# Patient Record
Sex: MALE | Race: WHITE | NOT HISPANIC OR LATINO | Employment: OTHER | URBAN - METROPOLITAN AREA
[De-identification: names, ages, dates, MRNs, and addresses within clinical notes are randomized per-mention and may not be internally consistent; named-entity substitution may affect disease eponyms.]

---

## 2017-11-02 ENCOUNTER — HOSPITAL ENCOUNTER (OUTPATIENT)
Dept: FAMILY MEDICINE CLINIC | Facility: CLINIC | Age: 82
Setting detail: SPECIMEN
Discharge: HOME | End: 2017-11-02
Attending: FAMILY MEDICINE | Admitting: FAMILY MEDICINE

## 2017-11-02 LAB
ALBUMIN SERPL-MCNC: 4.6 G/DL (ref 3.5–4.8)
ALBUMIN/GLOB SERPL: 1.9 {RATIO} (ref 1–1.7)
ALP SERPL-CCNC: 86 IU/L (ref 32–91)
ALT SERPL-CCNC: 22 IU/L (ref 17–63)
ANION GAP SERPL CALC-SCNC: 11.8 MMOL/L (ref 10–20)
AST SERPL-CCNC: 24 IU/L (ref 15–41)
BASOPHILS # BLD AUTO: 0 10*3/UL (ref 0–0.2)
BASOPHILS NFR BLD AUTO: 1 % (ref 0–2)
BILIRUB SERPL-MCNC: 0.7 MG/DL (ref 0.3–1.2)
BUN SERPL-MCNC: 14 MG/DL (ref 8–20)
BUN/CREAT SERPL: 20 (ref 6.2–20.3)
CALCIUM SERPL-MCNC: 9.5 MG/DL (ref 8.9–10.3)
CHLORIDE SERPL-SCNC: 101 MMOL/L (ref 101–111)
CHOLEST SERPL-MCNC: 160 MG/DL
CHOLEST/HDLC SERPL: 3.4 {RATIO}
CONV CO2: 29 MMOL/L (ref 22–32)
CONV LDL CHOLESTEROL DIRECT: 88 MG/DL (ref 0–100)
CONV TOTAL PROTEIN: 7 G/DL (ref 6.1–7.9)
CREAT UR-MCNC: 0.7 MG/DL (ref 0.7–1.2)
DIFFERENTIAL METHOD BLD: (no result)
EOSINOPHIL # BLD AUTO: 0.1 10*3/UL (ref 0–0.3)
EOSINOPHIL # BLD AUTO: 2 % (ref 0–3)
ERYTHROCYTE [DISTWIDTH] IN BLOOD BY AUTOMATED COUNT: 12.7 % (ref 11.5–14.5)
GLOBULIN UR ELPH-MCNC: 2.4 G/DL (ref 2.5–3.8)
GLUCOSE SERPL-MCNC: 110 MG/DL (ref 65–99)
HCT VFR BLD AUTO: 45.1 % (ref 40–54)
HDLC SERPL-MCNC: 47 MG/DL
HGB BLD-MCNC: 15 G/DL (ref 14–18)
LDLC/HDLC SERPL: 1.9 {RATIO}
LIPID INTERPRETATION: ABNORMAL
LYMPHOCYTES # BLD AUTO: 0.8 10*3/UL (ref 0.8–4.8)
LYMPHOCYTES NFR BLD AUTO: 16 % (ref 18–42)
MCH RBC QN AUTO: 32.4 PG (ref 26–32)
MCHC RBC AUTO-ENTMCNC: 33.4 G/DL (ref 32–36)
MCV RBC AUTO: 97.1 FL (ref 80–94)
MONOCYTES # BLD AUTO: 0.6 10*3/UL (ref 0.1–1.3)
MONOCYTES NFR BLD AUTO: 11 % (ref 2–11)
NEUTROPHILS # BLD AUTO: 3.7 10*3/UL (ref 2.3–8.6)
NEUTROPHILS NFR BLD AUTO: 70 % (ref 50–75)
NRBC BLD AUTO-RTO: 0 /100{WBCS}
NRBC/RBC NFR BLD MANUAL: 0 10*3/UL
PLATELET # BLD AUTO: 191 10*3/UL (ref 150–450)
PMV BLD AUTO: 8.2 FL (ref 7.4–10.4)
POTASSIUM SERPL-SCNC: 3.8 MMOL/L (ref 3.6–5.1)
RBC # BLD AUTO: 4.64 10*6/UL (ref 4.6–6)
SODIUM SERPL-SCNC: 138 MMOL/L (ref 136–144)
TRIGL SERPL-MCNC: 177 MG/DL
VLDLC SERPL CALC-MCNC: 25 MG/DL
WBC # BLD AUTO: 5.2 10*3/UL (ref 4.5–11.5)

## 2018-04-18 ENCOUNTER — HOSPITAL ENCOUNTER (OUTPATIENT)
Dept: FAMILY MEDICINE CLINIC | Facility: CLINIC | Age: 83
Setting detail: SPECIMEN
Discharge: HOME | End: 2018-04-18
Attending: FAMILY MEDICINE | Admitting: FAMILY MEDICINE

## 2018-04-18 LAB
ALBUMIN SERPL-MCNC: 4.7 G/DL (ref 3.5–4.8)
ALBUMIN/GLOB SERPL: 1.6 {RATIO} (ref 1–1.7)
ALP SERPL-CCNC: 89 IU/L (ref 32–91)
ALT SERPL-CCNC: 24 IU/L (ref 17–63)
ANION GAP SERPL CALC-SCNC: 11.1 MMOL/L (ref 10–20)
AST SERPL-CCNC: 26 IU/L (ref 15–41)
BILIRUB SERPL-MCNC: 0.7 MG/DL (ref 0.3–1.2)
BILIRUB UR QL STRIP: NEGATIVE MG/DL
BUN SERPL-MCNC: 17 MG/DL (ref 8–20)
BUN/CREAT SERPL: 17 (ref 6.2–20.3)
CALCIUM SERPL-MCNC: 9.9 MG/DL (ref 8.9–10.3)
CASTS URNS QL MICRO: NORMAL /[LPF]
CHLORIDE SERPL-SCNC: 100 MMOL/L (ref 101–111)
CHOLEST SERPL-MCNC: 168 MG/DL
CHOLEST/HDLC SERPL: 3.7 {RATIO}
COLOR UR: YELLOW
CONV BACTERIA IN URINE MICRO: NEGATIVE
CONV CLARITY OF URINE: CLEAR
CONV CO2: 30 MMOL/L (ref 22–32)
CONV HYALINE CASTS IN URINE MICRO: 2 /[LPF] (ref 0–5)
CONV LDL CHOLESTEROL DIRECT: 95 MG/DL (ref 0–100)
CONV PROTEIN IN URINE BY AUTOMATED TEST STRIP: NEGATIVE MG/DL
CONV SMALL ROUND CELLS: NORMAL /[HPF]
CONV TOTAL PROTEIN: 7.6 G/DL (ref 6.1–7.9)
CONV UROBILINOGEN IN URINE BY AUTOMATED TEST STRIP: 0.2 MG/DL
CREAT UR-MCNC: 1 MG/DL (ref 0.7–1.2)
CULTURE INDICATED?: NORMAL
GLOBULIN UR ELPH-MCNC: 2.9 G/DL (ref 2.5–3.8)
GLUCOSE SERPL-MCNC: 126 MG/DL (ref 65–99)
GLUCOSE UR QL: NEGATIVE MG/DL
HDLC SERPL-MCNC: 46 MG/DL
HGB UR QL STRIP: NEGATIVE
KETONES UR QL STRIP: NEGATIVE MG/DL
LDLC/HDLC SERPL: 2.1 {RATIO}
LEUKOCYTE ESTERASE UR QL STRIP: NEGATIVE
LIPID INTERPRETATION: ABNORMAL
NITRITE UR QL STRIP: NEGATIVE
PH UR STRIP.AUTO: 5 [PH] (ref 4.5–8)
POTASSIUM SERPL-SCNC: 4.1 MMOL/L (ref 3.6–5.1)
RBC #/AREA URNS HPF: 0 /[HPF] (ref 0–3)
SODIUM SERPL-SCNC: 137 MMOL/L (ref 136–144)
SP GR UR: 1.02 (ref 1–1.03)
SPERM URNS QL MICRO: NORMAL /[HPF]
SQUAMOUS SPT QL MICRO: 1 /[HPF] (ref 0–5)
TRIGL SERPL-MCNC: 133 MG/DL
UNIDENT CRYS URNS QL MICRO: NORMAL /[HPF]
VLDLC SERPL CALC-MCNC: 27.9 MG/DL
WBC #/AREA URNS HPF: 1 /[HPF] (ref 0–5)
YEAST SPEC QL WET PREP: NORMAL /[HPF]

## 2018-08-31 ENCOUNTER — HOSPITAL ENCOUNTER (OUTPATIENT)
Dept: URGENT CARE | Facility: CLINIC | Age: 83
Discharge: HOME | End: 2018-08-31
Attending: EMERGENCY MEDICINE | Admitting: EMERGENCY MEDICINE

## 2018-11-13 ENCOUNTER — HOSPITAL ENCOUNTER (OUTPATIENT)
Dept: FAMILY MEDICINE CLINIC | Facility: CLINIC | Age: 83
Setting detail: SPECIMEN
Discharge: HOME | End: 2018-11-13
Attending: FAMILY MEDICINE | Admitting: FAMILY MEDICINE

## 2018-11-13 LAB
ALBUMIN SERPL-MCNC: 4.4 G/DL (ref 3.5–4.8)
ALBUMIN/GLOB SERPL: 1.8 {RATIO} (ref 1–1.7)
ALP SERPL-CCNC: 91 IU/L (ref 32–91)
ALT SERPL-CCNC: 23 IU/L (ref 17–63)
ANION GAP SERPL CALC-SCNC: 11 MMOL/L (ref 10–20)
AST SERPL-CCNC: 25 IU/L (ref 15–41)
BASOPHILS # BLD AUTO: 0 10*3/UL (ref 0–0.2)
BASOPHILS NFR BLD AUTO: 1 % (ref 0–2)
BILIRUB SERPL-MCNC: 0.8 MG/DL (ref 0.3–1.2)
BILIRUB UR QL STRIP: NEGATIVE MG/DL
BUN SERPL-MCNC: 14 MG/DL (ref 8–20)
BUN/CREAT SERPL: 15.6 (ref 6.2–20.3)
CALCIUM SERPL-MCNC: 9.3 MG/DL (ref 8.9–10.3)
CASTS URNS QL MICRO: NORMAL /[LPF]
CHLORIDE SERPL-SCNC: 99 MMOL/L (ref 101–111)
CHOLEST SERPL-MCNC: 173 MG/DL
CHOLEST/HDLC SERPL: 3.8 {RATIO}
COLOR UR: YELLOW
CONV BACTERIA IN URINE MICRO: NEGATIVE
CONV CLARITY OF URINE: CLEAR
CONV CO2: 28 MMOL/L (ref 22–32)
CONV HYALINE CASTS IN URINE MICRO: 0 /[LPF] (ref 0–5)
CONV LDL CHOLESTEROL DIRECT: 102 MG/DL (ref 0–100)
CONV PROTEIN IN URINE BY AUTOMATED TEST STRIP: NEGATIVE MG/DL
CONV SMALL ROUND CELLS: NORMAL /[HPF]
CONV TOTAL PROTEIN: 6.8 G/DL (ref 6.1–7.9)
CONV UROBILINOGEN IN URINE BY AUTOMATED TEST STRIP: 0.2 MG/DL
CREAT UR-MCNC: 0.9 MG/DL (ref 0.7–1.2)
CULTURE INDICATED?: NORMAL
DIFFERENTIAL METHOD BLD: (no result)
EOSINOPHIL # BLD AUTO: 0.1 10*3/UL (ref 0–0.3)
EOSINOPHIL # BLD AUTO: 1 % (ref 0–3)
ERYTHROCYTE [DISTWIDTH] IN BLOOD BY AUTOMATED COUNT: 12.7 % (ref 11.5–14.5)
GLOBULIN UR ELPH-MCNC: 2.4 G/DL (ref 2.5–3.8)
GLUCOSE SERPL-MCNC: 127 MG/DL (ref 65–99)
GLUCOSE UR QL: NEGATIVE MG/DL
HCT VFR BLD AUTO: 44.3 % (ref 40–54)
HDLC SERPL-MCNC: 45 MG/DL
HGB BLD-MCNC: 14.7 G/DL (ref 14–18)
HGB UR QL STRIP: NEGATIVE
KETONES UR QL STRIP: NEGATIVE MG/DL
LDLC/HDLC SERPL: 2.3 {RATIO}
LEUKOCYTE ESTERASE UR QL STRIP: NEGATIVE
LIPID INTERPRETATION: ABNORMAL
LYMPHOCYTES # BLD AUTO: 0.9 10*3/UL (ref 0.8–4.8)
LYMPHOCYTES NFR BLD AUTO: 17 % (ref 18–42)
MCH RBC QN AUTO: 31.7 PG (ref 26–32)
MCHC RBC AUTO-ENTMCNC: 33.1 G/DL (ref 32–36)
MCV RBC AUTO: 95.8 FL (ref 80–94)
MONOCYTES # BLD AUTO: 0.6 10*3/UL (ref 0.1–1.3)
MONOCYTES NFR BLD AUTO: 11 % (ref 2–11)
NEUTROPHILS # BLD AUTO: 3.7 10*3/UL (ref 2.3–8.6)
NEUTROPHILS NFR BLD AUTO: 70 % (ref 50–75)
NITRITE UR QL STRIP: NEGATIVE
NRBC BLD AUTO-RTO: 0 /100{WBCS}
NRBC/RBC NFR BLD MANUAL: 0 10*3/UL
PH UR STRIP.AUTO: 5.5 [PH] (ref 4.5–8)
PLATELET # BLD AUTO: 200 10*3/UL (ref 150–450)
PMV BLD AUTO: 8.9 FL (ref 7.4–10.4)
POTASSIUM SERPL-SCNC: 4 MMOL/L (ref 3.6–5.1)
RBC # BLD AUTO: 4.63 10*6/UL (ref 4.6–6)
RBC #/AREA URNS HPF: 0 /[HPF] (ref 0–3)
SODIUM SERPL-SCNC: 134 MMOL/L (ref 136–144)
SP GR UR: 1.02 (ref 1–1.03)
SPERM URNS QL MICRO: NORMAL /[HPF]
SQUAMOUS SPT QL MICRO: 0 /[HPF] (ref 0–5)
TRIGL SERPL-MCNC: 171 MG/DL
UNIDENT CRYS URNS QL MICRO: NORMAL /[HPF]
VLDLC SERPL CALC-MCNC: 25.8 MG/DL
WBC # BLD AUTO: 5.3 10*3/UL (ref 4.5–11.5)
WBC #/AREA URNS HPF: 1 /[HPF] (ref 0–5)
YEAST SPEC QL WET PREP: NORMAL /[HPF]

## 2019-05-13 ENCOUNTER — HOSPITAL ENCOUNTER (OUTPATIENT)
Dept: FAMILY MEDICINE CLINIC | Facility: CLINIC | Age: 84
Setting detail: SPECIMEN
Discharge: HOME | End: 2019-05-13
Attending: FAMILY MEDICINE | Admitting: FAMILY MEDICINE

## 2019-05-13 LAB
ALBUMIN SERPL-MCNC: 4.3 G/DL (ref 3.5–4.8)
ALBUMIN/GLOB SERPL: 1.7 {RATIO} (ref 1–1.7)
ALP SERPL-CCNC: 86 IU/L (ref 32–91)
ALT SERPL-CCNC: 20 IU/L (ref 17–63)
ANION GAP SERPL CALC-SCNC: 12.1 MMOL/L (ref 10–20)
AST SERPL-CCNC: 21 IU/L (ref 15–41)
BASOPHILS # BLD AUTO: 0 10*3/UL (ref 0–0.2)
BASOPHILS NFR BLD AUTO: 0 % (ref 0–2)
BILIRUB SERPL-MCNC: 0.7 MG/DL (ref 0.3–1.2)
BUN SERPL-MCNC: 13 MG/DL (ref 8–20)
BUN/CREAT SERPL: 14.4 (ref 6.2–20.3)
CALCIUM SERPL-MCNC: 9.3 MG/DL (ref 8.9–10.3)
CHLORIDE SERPL-SCNC: 102 MMOL/L (ref 101–111)
CHOLEST SERPL-MCNC: 170 MG/DL
CHOLEST/HDLC SERPL: 3.9 {RATIO}
CONV CO2: 26 MMOL/L (ref 22–32)
CONV LDL CHOLESTEROL DIRECT: 99 MG/DL (ref 0–100)
CONV TOTAL PROTEIN: 6.8 G/DL (ref 6.1–7.9)
CREAT UR-MCNC: 0.9 MG/DL (ref 0.7–1.2)
DIFFERENTIAL METHOD BLD: (no result)
EOSINOPHIL # BLD AUTO: 0.1 10*3/UL (ref 0–0.3)
EOSINOPHIL # BLD AUTO: 2 % (ref 0–3)
ERYTHROCYTE [DISTWIDTH] IN BLOOD BY AUTOMATED COUNT: 12.8 % (ref 11.5–14.5)
GLOBULIN UR ELPH-MCNC: 2.5 G/DL (ref 2.5–3.8)
GLUCOSE SERPL-MCNC: 129 MG/DL (ref 65–99)
HBA1C MFR BLD: 6.7 % (ref 0–5.6)
HCT VFR BLD AUTO: 42.8 % (ref 40–54)
HDLC SERPL-MCNC: 43 MG/DL
HGB BLD-MCNC: 14.5 G/DL (ref 14–18)
LDLC/HDLC SERPL: 2.3 {RATIO}
LIPID INTERPRETATION: ABNORMAL
LYMPHOCYTES # BLD AUTO: 0.9 10*3/UL (ref 0.8–4.8)
LYMPHOCYTES NFR BLD AUTO: 19 % (ref 18–42)
MCH RBC QN AUTO: 32.7 PG (ref 26–32)
MCHC RBC AUTO-ENTMCNC: 34 G/DL (ref 32–36)
MCV RBC AUTO: 96.4 FL (ref 80–94)
MONOCYTES # BLD AUTO: 0.5 10*3/UL (ref 0.1–1.3)
MONOCYTES NFR BLD AUTO: 11 % (ref 2–11)
NEUTROPHILS # BLD AUTO: 3.1 10*3/UL (ref 2.3–8.6)
NEUTROPHILS NFR BLD AUTO: 68 % (ref 50–75)
NRBC BLD AUTO-RTO: 0 /100{WBCS}
NRBC/RBC NFR BLD MANUAL: 0 10*3/UL
PLATELET # BLD AUTO: 192 10*3/UL (ref 150–450)
PMV BLD AUTO: 8.4 FL (ref 7.4–10.4)
POTASSIUM SERPL-SCNC: 4.1 MMOL/L (ref 3.6–5.1)
PSA SERPL-MCNC: 13.39 NG/ML (ref 0–4)
RBC # BLD AUTO: 4.44 10*6/UL (ref 4.6–6)
SODIUM SERPL-SCNC: 136 MMOL/L (ref 136–144)
TRIGL SERPL-MCNC: 204 MG/DL
VLDLC SERPL CALC-MCNC: 27.7 MG/DL
WBC # BLD AUTO: 4.6 10*3/UL (ref 4.5–11.5)

## 2019-05-31 ENCOUNTER — APPOINTMENT (OUTPATIENT)
Dept: PREADMISSION TESTING | Facility: HOSPITAL | Age: 84
End: 2019-05-31

## 2019-05-31 VITALS
TEMPERATURE: 97.7 F | RESPIRATION RATE: 20 BRPM | WEIGHT: 163.9 LBS | OXYGEN SATURATION: 100 % | SYSTOLIC BLOOD PRESSURE: 175 MMHG | DIASTOLIC BLOOD PRESSURE: 77 MMHG | HEIGHT: 66 IN | HEART RATE: 54 BPM | BODY MASS INDEX: 26.34 KG/M2

## 2019-05-31 LAB
ANION GAP SERPL CALCULATED.3IONS-SCNC: 11.4 MMOL/L
BUN BLD-MCNC: 16 MG/DL (ref 8–23)
BUN/CREAT SERPL: 19.8 (ref 7–25)
CALCIUM SPEC-SCNC: 9.4 MG/DL (ref 8.6–10.5)
CHLORIDE SERPL-SCNC: 100 MMOL/L (ref 98–107)
CO2 SERPL-SCNC: 27.6 MMOL/L (ref 22–29)
CREAT BLD-MCNC: 0.81 MG/DL (ref 0.76–1.27)
DEPRECATED RDW RBC AUTO: 42.7 FL (ref 37–54)
ERYTHROCYTE [DISTWIDTH] IN BLOOD BY AUTOMATED COUNT: 11.9 % (ref 12.3–15.4)
GFR SERPL CREATININE-BSD FRML MDRD: 91 ML/MIN/1.73
GLUCOSE BLD-MCNC: 122 MG/DL (ref 65–99)
HCT VFR BLD AUTO: 43.6 % (ref 37.5–51)
HGB BLD-MCNC: 14.3 G/DL (ref 13–17.7)
MCH RBC QN AUTO: 31.8 PG (ref 26.6–33)
MCHC RBC AUTO-ENTMCNC: 32.8 G/DL (ref 31.5–35.7)
MCV RBC AUTO: 96.9 FL (ref 79–97)
PLATELET # BLD AUTO: 177 10*3/MM3 (ref 140–450)
PMV BLD AUTO: 10.2 FL (ref 6–12)
POTASSIUM BLD-SCNC: 4.2 MMOL/L (ref 3.5–5.2)
RBC # BLD AUTO: 4.5 10*6/MM3 (ref 4.14–5.8)
SODIUM BLD-SCNC: 139 MMOL/L (ref 136–145)
WBC NRBC COR # BLD: 4.57 10*3/MM3 (ref 3.4–10.8)

## 2019-05-31 PROCEDURE — 36415 COLL VENOUS BLD VENIPUNCTURE: CPT

## 2019-05-31 PROCEDURE — 80048 BASIC METABOLIC PNL TOTAL CA: CPT | Performed by: OPHTHALMOLOGY

## 2019-05-31 PROCEDURE — 85027 COMPLETE CBC AUTOMATED: CPT | Performed by: OPHTHALMOLOGY

## 2019-05-31 RX ORDER — TRAZODONE HYDROCHLORIDE 50 MG/1
50 TABLET ORAL NIGHTLY
COMMUNITY
End: 2019-11-05 | Stop reason: SDUPTHER

## 2019-05-31 RX ORDER — PANTOPRAZOLE SODIUM 40 MG/1
40 TABLET, DELAYED RELEASE ORAL DAILY
COMMUNITY
End: 2020-02-20 | Stop reason: SDUPTHER

## 2019-05-31 RX ORDER — OXCARBAZEPINE 300 MG/1
300 TABLET, FILM COATED ORAL 2 TIMES DAILY
COMMUNITY
End: 2019-09-30 | Stop reason: SDUPTHER

## 2019-05-31 RX ORDER — ATORVASTATIN CALCIUM 20 MG/1
20 TABLET, FILM COATED ORAL NIGHTLY
COMMUNITY
End: 2020-02-06 | Stop reason: SDUPTHER

## 2019-05-31 RX ORDER — UBIDECARENONE 100 MG
100 CAPSULE ORAL DAILY
COMMUNITY
End: 2021-04-12

## 2019-05-31 RX ORDER — PINDOLOL 5 MG/1
5 TABLET ORAL 2 TIMES DAILY
COMMUNITY
End: 2019-10-30 | Stop reason: SDUPTHER

## 2019-05-31 RX ORDER — ONDANSETRON HYDROCHLORIDE 8 MG/1
8 TABLET, FILM COATED ORAL EVERY 8 HOURS PRN
COMMUNITY
End: 2021-08-11

## 2019-06-06 ENCOUNTER — HOSPITAL ENCOUNTER (OUTPATIENT)
Facility: HOSPITAL | Age: 84
Setting detail: HOSPITAL OUTPATIENT SURGERY
Discharge: HOME | End: 2019-06-06
Attending: OPHTHALMOLOGY | Admitting: OPHTHALMOLOGY

## 2019-06-06 ENCOUNTER — ANESTHESIA (OUTPATIENT)
Dept: PERIOP | Facility: HOSPITAL | Age: 84
End: 2019-06-06

## 2019-06-06 ENCOUNTER — ANESTHESIA EVENT (OUTPATIENT)
Dept: PERIOP | Facility: HOSPITAL | Age: 84
End: 2019-06-06

## 2019-06-06 VITALS
BODY MASS INDEX: 26.44 KG/M2 | WEIGHT: 163.8 LBS | DIASTOLIC BLOOD PRESSURE: 94 MMHG | RESPIRATION RATE: 18 BRPM | HEART RATE: 86 BPM | TEMPERATURE: 97.6 F | OXYGEN SATURATION: 96 % | SYSTOLIC BLOOD PRESSURE: 168 MMHG

## 2019-06-06 PROCEDURE — 25010000002 PROPOFOL 10 MG/ML EMULSION: Performed by: NURSE ANESTHETIST, CERTIFIED REGISTERED

## 2019-06-06 PROCEDURE — 25010000002 ONDANSETRON PER 1 MG: Performed by: NURSE ANESTHETIST, CERTIFIED REGISTERED

## 2019-06-06 PROCEDURE — 25010000002 ONDANSETRON PER 1 MG: Performed by: ANESTHESIOLOGY

## 2019-06-06 RX ORDER — HYDRALAZINE HYDROCHLORIDE 20 MG/ML
5 INJECTION INTRAMUSCULAR; INTRAVENOUS
Status: DISCONTINUED | OUTPATIENT
Start: 2019-06-06 | End: 2019-06-06 | Stop reason: HOSPADM

## 2019-06-06 RX ORDER — HYDROCODONE BITARTRATE AND ACETAMINOPHEN 5; 325 MG/1; MG/1
1 TABLET ORAL ONCE AS NEEDED
Status: DISCONTINUED | OUTPATIENT
Start: 2019-06-06 | End: 2019-06-06 | Stop reason: HOSPADM

## 2019-06-06 RX ORDER — PROPOFOL 10 MG/ML
VIAL (ML) INTRAVENOUS CONTINUOUS PRN
Status: DISCONTINUED | OUTPATIENT
Start: 2019-06-06 | End: 2019-06-06 | Stop reason: SURG

## 2019-06-06 RX ORDER — ACETAMINOPHEN 325 MG/1
650 TABLET ORAL ONCE AS NEEDED
Status: DISCONTINUED | OUTPATIENT
Start: 2019-06-06 | End: 2019-06-06 | Stop reason: HOSPADM

## 2019-06-06 RX ORDER — SODIUM CHLORIDE 0.9 % (FLUSH) 0.9 %
1-10 SYRINGE (ML) INJECTION AS NEEDED
Status: DISCONTINUED | OUTPATIENT
Start: 2019-06-06 | End: 2019-06-06 | Stop reason: HOSPADM

## 2019-06-06 RX ORDER — GLYCOPYRROLATE 0.2 MG/ML
0.2 INJECTION INTRAMUSCULAR; INTRAVENOUS ONCE
Status: COMPLETED | OUTPATIENT
Start: 2019-06-06 | End: 2019-06-06

## 2019-06-06 RX ORDER — ACETAMINOPHEN 650 MG/1
650 SUPPOSITORY RECTAL ONCE AS NEEDED
Status: DISCONTINUED | OUTPATIENT
Start: 2019-06-06 | End: 2019-06-06 | Stop reason: HOSPADM

## 2019-06-06 RX ORDER — PROMETHAZINE HYDROCHLORIDE 25 MG/ML
6.25 INJECTION, SOLUTION INTRAMUSCULAR; INTRAVENOUS
Status: DISCONTINUED | OUTPATIENT
Start: 2019-06-06 | End: 2019-06-06 | Stop reason: HOSPADM

## 2019-06-06 RX ORDER — ONDANSETRON 2 MG/ML
4 INJECTION INTRAMUSCULAR; INTRAVENOUS ONCE AS NEEDED
Status: COMPLETED | OUTPATIENT
Start: 2019-06-06 | End: 2019-06-06

## 2019-06-06 RX ORDER — LIDOCAINE HYDROCHLORIDE 10 MG/ML
0.5 INJECTION, SOLUTION EPIDURAL; INFILTRATION; INTRACAUDAL; PERINEURAL ONCE AS NEEDED
Status: DISCONTINUED | OUTPATIENT
Start: 2019-06-06 | End: 2019-06-06 | Stop reason: HOSPADM

## 2019-06-06 RX ORDER — NALOXONE HCL 0.4 MG/ML
0.2 VIAL (ML) INJECTION AS NEEDED
Status: DISCONTINUED | OUTPATIENT
Start: 2019-06-06 | End: 2019-06-06 | Stop reason: HOSPADM

## 2019-06-06 RX ORDER — ONDANSETRON 4 MG/1
4 TABLET, FILM COATED ORAL DAILY PRN
Qty: 15 TABLET | Refills: 1 | Status: SHIPPED | OUTPATIENT
Start: 2019-06-06 | End: 2020-06-05

## 2019-06-06 RX ORDER — MIDAZOLAM HYDROCHLORIDE 1 MG/ML
1 INJECTION INTRAMUSCULAR; INTRAVENOUS
Status: DISCONTINUED | OUTPATIENT
Start: 2019-06-06 | End: 2019-06-06 | Stop reason: HOSPADM

## 2019-06-06 RX ORDER — SODIUM CHLORIDE, SODIUM LACTATE, POTASSIUM CHLORIDE, CALCIUM CHLORIDE 600; 310; 30; 20 MG/100ML; MG/100ML; MG/100ML; MG/100ML
30 INJECTION, SOLUTION INTRAVENOUS CONTINUOUS
Status: DISCONTINUED | OUTPATIENT
Start: 2019-06-06 | End: 2019-06-06 | Stop reason: HOSPADM

## 2019-06-06 RX ORDER — ONDANSETRON 2 MG/ML
4 INJECTION INTRAMUSCULAR; INTRAVENOUS ONCE AS NEEDED
Status: DISCONTINUED | OUTPATIENT
Start: 2019-06-06 | End: 2019-06-06 | Stop reason: HOSPADM

## 2019-06-06 RX ORDER — HYDROCODONE BITARTRATE AND ACETAMINOPHEN 5; 325 MG/1; MG/1
1 TABLET ORAL EVERY 4 HOURS PRN
Qty: 15 TABLET | Refills: 0 | Status: SHIPPED | OUTPATIENT
Start: 2019-06-06 | End: 2019-11-11

## 2019-06-06 RX ORDER — FAMOTIDINE 10 MG/ML
20 INJECTION, SOLUTION INTRAVENOUS ONCE
Status: COMPLETED | OUTPATIENT
Start: 2019-06-06 | End: 2019-06-06

## 2019-06-06 RX ORDER — PROMETHAZINE HYDROCHLORIDE 25 MG/1
25 SUPPOSITORY RECTAL ONCE AS NEEDED
Status: DISCONTINUED | OUTPATIENT
Start: 2019-06-06 | End: 2019-06-06 | Stop reason: HOSPADM

## 2019-06-06 RX ORDER — TETRACAINE HYDROCHLORIDE 5 MG/ML
SOLUTION OPHTHALMIC AS NEEDED
Status: DISCONTINUED | OUTPATIENT
Start: 2019-06-06 | End: 2019-06-06 | Stop reason: HOSPADM

## 2019-06-06 RX ORDER — DIPHENHYDRAMINE HCL 25 MG
25 CAPSULE ORAL NIGHTLY PRN
COMMUNITY
End: 2021-04-12

## 2019-06-06 RX ORDER — MIDAZOLAM HYDROCHLORIDE 1 MG/ML
2 INJECTION INTRAMUSCULAR; INTRAVENOUS
Status: DISCONTINUED | OUTPATIENT
Start: 2019-06-06 | End: 2019-06-06 | Stop reason: HOSPADM

## 2019-06-06 RX ORDER — ERYTHROMYCIN 5 MG/G
OINTMENT OPHTHALMIC 2 TIMES DAILY
Qty: 3.5 G | Refills: 0 | Status: SHIPPED | OUTPATIENT
Start: 2019-06-06 | End: 2019-06-13

## 2019-06-06 RX ORDER — MAGNESIUM HYDROXIDE 1200 MG/15ML
LIQUID ORAL AS NEEDED
Status: DISCONTINUED | OUTPATIENT
Start: 2019-06-06 | End: 2019-06-06 | Stop reason: HOSPADM

## 2019-06-06 RX ORDER — EPHEDRINE SULFATE 50 MG/ML
5 INJECTION, SOLUTION INTRAVENOUS ONCE AS NEEDED
Status: DISCONTINUED | OUTPATIENT
Start: 2019-06-06 | End: 2019-06-06 | Stop reason: HOSPADM

## 2019-06-06 RX ORDER — DIPHENHYDRAMINE HCL 25 MG
25 CAPSULE ORAL
Status: DISCONTINUED | OUTPATIENT
Start: 2019-06-06 | End: 2019-06-06 | Stop reason: HOSPADM

## 2019-06-06 RX ORDER — SODIUM CHLORIDE, SODIUM LACTATE, POTASSIUM CHLORIDE, CALCIUM CHLORIDE 600; 310; 30; 20 MG/100ML; MG/100ML; MG/100ML; MG/100ML
9 INJECTION, SOLUTION INTRAVENOUS CONTINUOUS
Status: DISCONTINUED | OUTPATIENT
Start: 2019-06-06 | End: 2019-06-06 | Stop reason: HOSPADM

## 2019-06-06 RX ORDER — PROMETHAZINE HYDROCHLORIDE 25 MG/ML
12.5 INJECTION, SOLUTION INTRAMUSCULAR; INTRAVENOUS ONCE AS NEEDED
Status: DISCONTINUED | OUTPATIENT
Start: 2019-06-06 | End: 2019-06-06 | Stop reason: HOSPADM

## 2019-06-06 RX ORDER — FENTANYL CITRATE 50 UG/ML
50 INJECTION, SOLUTION INTRAMUSCULAR; INTRAVENOUS
Status: DISCONTINUED | OUTPATIENT
Start: 2019-06-06 | End: 2019-06-06 | Stop reason: HOSPADM

## 2019-06-06 RX ORDER — PROMETHAZINE HYDROCHLORIDE 25 MG/1
25 TABLET ORAL ONCE AS NEEDED
Status: DISCONTINUED | OUTPATIENT
Start: 2019-06-06 | End: 2019-06-06 | Stop reason: HOSPADM

## 2019-06-06 RX ORDER — ERYTHROMYCIN 5 MG/G
OINTMENT OPHTHALMIC AS NEEDED
Status: DISCONTINUED | OUTPATIENT
Start: 2019-06-06 | End: 2019-06-06 | Stop reason: HOSPADM

## 2019-06-06 RX ORDER — ONDANSETRON 2 MG/ML
INJECTION INTRAMUSCULAR; INTRAVENOUS AS NEEDED
Status: DISCONTINUED | OUTPATIENT
Start: 2019-06-06 | End: 2019-06-06 | Stop reason: SURG

## 2019-06-06 RX ORDER — FLUMAZENIL 0.1 MG/ML
0.2 INJECTION INTRAVENOUS AS NEEDED
Status: DISCONTINUED | OUTPATIENT
Start: 2019-06-06 | End: 2019-06-06 | Stop reason: HOSPADM

## 2019-06-06 RX ADMIN — PROPOFOL 25 MCG/KG/MIN: 10 INJECTION, EMULSION INTRAVENOUS at 11:43

## 2019-06-06 RX ADMIN — ONDANSETRON 4 MG: 2 INJECTION INTRAMUSCULAR; INTRAVENOUS at 12:20

## 2019-06-06 RX ADMIN — FAMOTIDINE 20 MG: 10 INJECTION INTRAVENOUS at 11:28

## 2019-06-06 RX ADMIN — ONDANSETRON HYDROCHLORIDE 4 MG: 2 SOLUTION INTRAMUSCULAR; INTRAVENOUS at 11:28

## 2019-06-06 RX ADMIN — GLYCOPYRROLATE 0.2 MG: 0.2 INJECTION, SOLUTION INTRAMUSCULAR; INTRAVENOUS at 11:28

## 2019-06-06 RX ADMIN — SODIUM CHLORIDE, POTASSIUM CHLORIDE, SODIUM LACTATE AND CALCIUM CHLORIDE 9 ML/HR: 600; 310; 30; 20 INJECTION, SOLUTION INTRAVENOUS at 11:27

## 2019-06-06 NOTE — OP NOTE
OPERATIVE NOTE    Patient Identification:  Name: Mark Busch  Age: 85 y.o.  Sex: male  :  1933  MRN: [id number]                                               Preoperative diagnosis: left upper eyelid dermatochalasis,  bilateral lower eyelid ectropion  Postoperative diagnosis: same  Procedure: Left upper eyelid blepharoplasty, bilateral lower lid ectropion repair, bilateral medial spindle  Surgeon: Dr. Luis Edwards who was present and scrubbed throughout all critical portions of the operation  Assistants: Villa Castaneda Jr, MD , Joe Guillaume MD  Anesthesia: MAC  EBL: less than 50cc  Specimens: * No orders in the log *    Description of the procedure:     The patient was taken to the operating room and placed on the table in the supine position, where anesthesia was induced. 2% lidocaine with epinephrine and 0.5% marcaine in a 1:1 fashion was injected over the surgical site, and the patient was prepped and draped in the usual manner for orbitofacial surgery.     Corneal protectors were placed in both eyes.     A 15 Bard-Sma blade incision was made 6 mm from the eyelash margin, across the entire horizontal extent of the left upper eyelid. A second incision was made 12 mm inferior to the junction of the brow and eyelid, and a pinch test was used to ensure the amount of skin excision was appropriate. The intervening tissue was excised with a 15 Bard-Sam blade and Esdon scissors. Excessive orbicularis tissue was removed. The orbital septum was opened horizontally, and excessive fatty tissue was also removed. Bleeding was controlled with electrocauterization. The skin was then closed with 5-0 fast absorbing suture in an interrupted and running fashion, with care to incorporate the prestarsal orbicularis over the pupil, nasal and temporal limbi respectively. The lid position and contour were examined and found to be satisfactory.     A 15 Bard-Sam blade incision was made at the left lateral  canthus. Sharp dissection was carried down to the lateral orbital rim periosteum. The inerior ramus of the lateral canthal tendon was identified and severed with sharp dissection. A full-thickness en bloc excision of the lateral aspect of the tarsal plate was carried out with sharp dissection, and bleeding was controlled with electrocauterization. A 10mm long by 4mm ellipse was incised on the palpebral conjunctiva medially, and Edson scissors were used to excise the conjunctiva and retractor layer, and was closed with 6-0 vicryl superficially.    The cut edge of the tarsal plate was advanced to the lateral orbital rim periosteum, where it was sutured with 5-0 vicryl suture to the internal aspect of the lateral orbital rim periosteum. The skin  was closed with 5-0 fast absorbing suture.     The exact same procedure was preformed over the contralateral lid    The corneal protectors were removed and antibiotic ophthalmic ointment was placed over the surgical site.     The patient was then awakened and taken from the operating room in good condition, having tolerated the procedure well. There were no complications, and the estimated blood loss was less than 50 cc.

## 2019-06-06 NOTE — ANESTHESIA POSTPROCEDURE EVALUATION
Patient: Mark Busch    Procedure Summary     Date:  06/06/19 Room / Location:   [facility]    Anesthesia Start:  1139 Anesthesia Stop:  1242    Procedures:       LEFT UPPER LID BLEPHAROPLASTY (Left Eye)      BILATERAL LOWER LID ECTROPION REPAIR WITH MEDIAL SPENDEL (Bilateral Eye) Diagnosis:      Surgeon:  Luis Edwards MD Provider:  Roxy Hernandez MD    Anesthesia Type:  general ASA Status:  3          Anesthesia Type: general  Last vitals  BP   169/90 (06/06/19 0950)   Temp   36.7 °C (98.1 °F) (06/06/19 0950)   Pulse   62 (06/06/19 0950)   Resp   18 (06/06/19 0950)     SpO2   96 % (06/06/19 0950)     Post Anesthesia Care and Evaluation    Patient location during evaluation: bedside  Patient participation: complete - patient participated  Level of consciousness: awake and alert  Pain management: adequate  Airway patency: patent  Anesthetic complications: No anesthetic complications  PONV Status: controlled  Cardiovascular status: acceptable  Respiratory status: acceptable  Hydration status: acceptable

## 2019-06-06 NOTE — ANESTHESIA PREPROCEDURE EVALUATION
Anesthesia Evaluation     Patient summary reviewed and Nursing notes reviewed   NPO Solid Status: > 8 hours  NPO Liquid Status: > 2 hours           Airway   Mallampati: II  TM distance: >3 FB  Neck ROM: full  Dental - normal exam     Pulmonary - normal exam    breath sounds clear to auscultation  (+) sleep apnea,   Cardiovascular - normal exam    ECG reviewed  Rhythm: regular  Rate: normal    (+) hypertension, hyperlipidemia,   (-) angina, orthopnea, PND, TRIVEDI      Neuro/Psych  (+) seizures well controlled, syncope (Vasovagal syncope),     GI/Hepatic/Renal/Endo    (+)  GERD,  diabetes mellitus (Prediabetes),     Musculoskeletal (-) negative ROS    Abdominal    Substance History - negative use     OB/GYN negative ob/gyn ROS         Other      history of cancer (History of rectal cancer)                    Anesthesia Plan    ASA 3     general     intravenous induction   Anesthetic plan, all risks, benefits, and alternatives have been provided, discussed and informed consent has been obtained with: patient.

## 2019-06-07 NOTE — H&P
" History & Physical       Patient: Mark Busch    Date of Admission: 6/6/2019  8:46 AM    YOB: 1933    Medical Record Number: [id number]      Chief Complaints: BLL ectropion, Left upper lid heaviness      History of Present Illness: 85 y.o. male presents with as above. No new meds/health problems since office visit      Allergies: No Known Allergies    10 point review of systems negative, except pertaining to the HPI    Medications:   Home Medications:  No current facility-administered medications on file prior to encounter.      Current Outpatient Medications on File Prior to Encounter   Medication Sig   • diphenhydrAMINE (BENADRYL) 25 mg capsule Take 25 mg by mouth At Night As Needed for Itching.     Current Medications:  Scheduled Meds:  Continuous Infusions:  No current facility-administered medications for this encounter.   PRN Meds:.    Past Medical History:   Diagnosis Date   • Dermatochalasis of both upper eyelids    • GERD (gastroesophageal reflux disease)    • History of rectal cancer 2004    HX RESECTION, COLOSTOMY, AND RADIATION   • History of seizure     ?? PT STATESHE HAS \"EPISODES\", LOSS OF CONSCIOUSNESS AT TIMES - ON MEIDCATION, SEES NEURO IN [place], NO \"EPISODES\" SINCE LAT 2018   • History of subdural hematoma    • Hyperlipidemia    • Hypertension    • Prediabetes    • Senile ectropion of both lower eyelids    • Sleep apnea     DOES NOT WEAR CPAP   • Vasovagal syncope         Past Surgical History:   Procedure Laterality Date   • RO HOLE FOR SUBDURAL HEMATOMA  2016   • CATARACT EXTRACTION, BILATERAL     • COLON RESECTION WITH COLOSTOMY  2004    D/T RECTAL CANCER    • HEMORRHOIDECTOMY     • HERNIA REPAIR     • PROSTATE BIOPSY          Social History     Occupational History   • Not on file   Tobacco Use   • Smoking status: Never Smoker   Substance and Sexual Activity   • Alcohol use: No     Frequency: Never   • Drug use: No   • Sexual activity: Defer    Social History     Social " History Narrative   • Not on file        Family History   Problem Relation Age of Onset   • Malig Hyperthermia Neg Hx            Physical Exam   Constitutional: Alert, cooperative, in no acute distress    Head: Normocephalic.   Eyes:   BLL ectropion, SAMSON dermatochalasis  Neck: Normal range of motion.   Cardiovascular: Normal rate.    Pulmonary/Chest: Effort normal.   Neurological: Alert.   Skin: Skin is warm.   Psychiatric: Normal mood and affect.       Assessment/Plan:  The patient voiced understanding of the risks, benefits, and alternative forms of treatment that were discussed and the patient consents to proceed with bilateral lower lid ectropion repair with medial spindle, LEFT UPPER LID BLEPHAROPLASTY.       Luis Edwards MD

## 2019-09-30 RX ORDER — OXCARBAZEPINE 300 MG/1
TABLET, FILM COATED ORAL
Qty: 180 TABLET | Refills: 3 | Status: SHIPPED | OUTPATIENT
Start: 2019-09-30 | End: 2020-09-21

## 2019-10-30 RX ORDER — PINDOLOL 5 MG/1
TABLET ORAL
Qty: 180 TABLET | Refills: 1 | Status: SHIPPED | OUTPATIENT
Start: 2019-10-30 | End: 2020-03-16

## 2019-11-01 ENCOUNTER — CLINICAL SUPPORT (OUTPATIENT)
Dept: FAMILY MEDICINE CLINIC | Facility: CLINIC | Age: 84
End: 2019-11-01

## 2019-11-01 DIAGNOSIS — Z23 NEEDS FLU SHOT: Primary | ICD-10-CM

## 2019-11-01 PROCEDURE — G0008 ADMIN INFLUENZA VIRUS VAC: HCPCS | Performed by: FAMILY MEDICINE

## 2019-11-01 PROCEDURE — 90653 IIV ADJUVANT VACCINE IM: CPT | Performed by: FAMILY MEDICINE

## 2019-11-05 RX ORDER — TRAZODONE HYDROCHLORIDE 50 MG/1
TABLET ORAL
Qty: 90 TABLET | Refills: 1 | Status: SHIPPED | OUTPATIENT
Start: 2019-11-05 | End: 2020-05-18

## 2019-11-11 ENCOUNTER — OFFICE VISIT (OUTPATIENT)
Dept: FAMILY MEDICINE CLINIC | Facility: CLINIC | Age: 84
End: 2019-11-11

## 2019-11-11 VITALS
HEART RATE: 60 BPM | SYSTOLIC BLOOD PRESSURE: 143 MMHG | BODY MASS INDEX: 25.87 KG/M2 | WEIGHT: 165.2 LBS | DIASTOLIC BLOOD PRESSURE: 73 MMHG | RESPIRATION RATE: 12 BRPM

## 2019-11-11 DIAGNOSIS — K56.600 PARTIAL SMALL BOWEL OBSTRUCTION (HCC): ICD-10-CM

## 2019-11-11 DIAGNOSIS — I10 ESSENTIAL HYPERTENSION: Primary | ICD-10-CM

## 2019-11-11 DIAGNOSIS — F33.42 RECURRENT MAJOR DEPRESSIVE DISORDER, IN FULL REMISSION (HCC): ICD-10-CM

## 2019-11-11 DIAGNOSIS — E11.9 TYPE 2 DIABETES MELLITUS WITHOUT COMPLICATION, WITHOUT LONG-TERM CURRENT USE OF INSULIN (HCC): ICD-10-CM

## 2019-11-11 PROBLEM — M65.30 TRIGGER FINGER: Status: ACTIVE | Noted: 2018-04-18

## 2019-11-11 PROBLEM — M50.90 DISORDER OF INTERVERTEBRAL DISC OF CERVICAL SPINE: Status: ACTIVE | Noted: 2018-08-31

## 2019-11-11 LAB
ALBUMIN SERPL-MCNC: 4.3 G/DL (ref 3.5–5.2)
ALBUMIN/GLOB SERPL: 1.3 G/DL
ALP SERPL-CCNC: 106 U/L (ref 39–117)
ALT SERPL W P-5'-P-CCNC: 16 U/L (ref 1–41)
ANION GAP SERPL CALCULATED.3IONS-SCNC: 12.6 MMOL/L (ref 5–15)
AST SERPL-CCNC: 17 U/L (ref 1–40)
BILIRUB SERPL-MCNC: 0.5 MG/DL (ref 0.2–1.2)
BUN BLD-MCNC: 21 MG/DL (ref 8–23)
BUN/CREAT SERPL: 22.1 (ref 7–25)
CALCIUM SPEC-SCNC: 9.5 MG/DL (ref 8.6–10.5)
CHLORIDE SERPL-SCNC: 101 MMOL/L (ref 98–107)
CO2 SERPL-SCNC: 26.4 MMOL/L (ref 22–29)
CREAT BLD-MCNC: 0.95 MG/DL (ref 0.76–1.27)
GFR SERPL CREATININE-BSD FRML MDRD: 75 ML/MIN/1.73
GLOBULIN UR ELPH-MCNC: 3.2 GM/DL
GLUCOSE BLD-MCNC: 123 MG/DL (ref 65–99)
HBA1C MFR BLD: 6.3 % (ref 3.5–5.6)
POTASSIUM BLD-SCNC: 4.2 MMOL/L (ref 3.5–5.2)
PROT SERPL-MCNC: 7.5 G/DL (ref 6–8.5)
SODIUM BLD-SCNC: 140 MMOL/L (ref 136–145)

## 2019-11-11 PROCEDURE — 83036 HEMOGLOBIN GLYCOSYLATED A1C: CPT | Performed by: FAMILY MEDICINE

## 2019-11-11 PROCEDURE — 80053 COMPREHEN METABOLIC PANEL: CPT | Performed by: FAMILY MEDICINE

## 2019-11-11 PROCEDURE — 99213 OFFICE O/P EST LOW 20 MIN: CPT | Performed by: FAMILY MEDICINE

## 2019-11-11 RX ORDER — CHLORAL HYDRATE 500 MG
CAPSULE ORAL
COMMUNITY
Start: 2013-06-18 | End: 2020-11-10

## 2019-11-11 NOTE — ASSESSMENT & PLAN NOTE
Hypertension is improving with treatment.  Continue current treatment regimen.  Regular aerobic exercise.  Continue current medications.  Blood pressure will be reassessed at the next regular appointment.

## 2019-11-11 NOTE — ASSESSMENT & PLAN NOTE
Diabetes is improving with treatment.   Continue current treatment regimen.  Reminded to bring in blood sugar diary at next visit.  Dietary recommendations for ADA diet.  Regular aerobic exercise.  Discussed foot care.  Diabetes will be reassessed in 6 months.

## 2019-11-11 NOTE — PROGRESS NOTES
Rooming Tab(CC,VS,Pt Hx,Fall Screen)  Chief Complaint   Patient presents with   • Diabetes   • Hypertension   • Hyperlipidemia       Subjective 86-year-old here for follow-up of his medical problems.  He has had no recent hospitalizations for small bowel obstruction.  He has had no real gastrointestinal problems.  He has a history of syncope but has had none in quite some time.  Patient has diabetes and his blood sugars are running 120.  He saw the ophthalmologist on 7/8/2019 he has no diabetic retinopathy.  He checks his feet daily.  He eats properly.  Patient has a history of depression and this is stable and is really feeling relatively well and has no complaints today.    I have reviewed and updated his medications, medical history and problem list during today's office visit.     Patient Care Team:  Jack Huynh MD as PCP - General  Jack Huynh MD as PCP - Family Medicine  Maggie Swain APRN (Nurse Practitioner)    Problem List Tab  Medications Tab  Synopsis Tab  Chart Review Tab  Care Everywhere Tab  Immunizations Tab  Patient History Tab    Social History     Tobacco Use   • Smoking status: Never Smoker   • Smokeless tobacco: Never Used   Substance Use Topics   • Alcohol use: No     Frequency: Never       Review of Systems   Constitutional: Negative for chills, fatigue and fever.   HENT: Negative for nosebleeds.    Eyes: Negative for double vision.   Respiratory: Negative for chest tightness and shortness of breath.    Cardiovascular: Negative for chest pain and palpitations.   Gastrointestinal: Negative for blood in stool.   Genitourinary: Negative for dysuria and hematuria.   Neurological: Negative for dizziness and syncope.   Psychiatric/Behavioral: Negative for depressed mood.       Objective     Rooming Tab(CC,VS,Pt Hx,Fall Screen)  /73 (BP Location: Right arm, Patient Position: Sitting, Cuff Size: Adult)   Pulse 60   Resp 12   Wt 74.9 kg (165 lb 3.2 oz)   BMI 25.87 kg/m²      Body mass index is 25.87 kg/m².    Physical Exam   Constitutional: He is oriented to person, place, and time. He appears well-developed and well-nourished. No distress.   Elderly pleasant looks to be in no distress   HENT:   Head: Normocephalic and atraumatic.   Nose: Nose normal.   Mouth/Throat: Oropharynx is clear and moist.   Eyes: Conjunctivae, EOM and lids are normal. Pupils are equal, round, and reactive to light.   Neck: Trachea normal and normal range of motion. Neck supple. No JVD present. Carotid bruit is not present. No thyroid mass and no thyromegaly present.   No carotid bruits   Cardiovascular: Normal rate, regular rhythm, normal heart sounds and intact distal pulses.   Pulmonary/Chest: Effort normal and breath sounds normal.   Abdominal: Soft. Bowel sounds are normal. There is no tenderness.   Musculoskeletal:   No c/c/e   Neurological: He is alert and oriented to person, place, and time. No cranial nerve deficit.   Skin: Skin is warm and dry.   Psychiatric: He has a normal mood and affect. His speech is normal and behavior is normal. He is attentive.   Nursing note and vitals reviewed.       Statin Choice Calculator  Data Reviewed:               Lab Results   Component Value Date    BUN 21 11/11/2019    CREATININE 0.95 11/11/2019    EGFRIFNONA 75 11/11/2019     11/11/2019    K 4.2 11/11/2019     11/11/2019    CALCIUM 9.5 11/11/2019    ALBUMIN 4.30 11/11/2019    BILITOT 0.5 11/11/2019    ALKPHOS 106 11/11/2019    AST 17 11/11/2019    ALT 16 11/11/2019      Assessment/Plan   Order Review Tab  Health Maintenance Tab  Patient Plan/Order Tab  Diagnoses and all orders for this visit:    1. Essential hypertension (Primary)  Assessment & Plan:  Hypertension is improving with treatment.  Continue current treatment regimen.  Regular aerobic exercise.  Continue current medications.  Blood pressure will be reassessed at the next regular appointment.      2. Partial small bowel obstruction  (CMS/[facility])  Assessment & Plan:  Stable       3. Type 2 diabetes mellitus without complication, without long-term current use of insulin (CMS/[facility])  Assessment & Plan:  Diabetes is improving with treatment.   Continue current treatment regimen.  Reminded to bring in blood sugar diary at next visit.  Dietary recommendations for ADA diet.  Regular aerobic exercise.  Discussed foot care.  Diabetes will be reassessed in 6 months.    Orders:  -     Hemoglobin A1c  -     Comprehensive Metabolic Panel    4. Recurrent major depressive disorder, in full remission (CMS/[facility])  Assessment & Plan:  Psychological condition is improving with treatment.  Continue current treatment regimen.  Regular aerobic exercise.  Psychological condition  will be reassessed at the next regular appointment.        Wrapup Tab  Return in about 6 months (around 5/14/2020) for Annual physical.

## 2020-02-06 ENCOUNTER — TELEPHONE (OUTPATIENT)
Dept: FAMILY MEDICINE CLINIC | Facility: CLINIC | Age: 85
End: 2020-02-06

## 2020-02-06 RX ORDER — ATORVASTATIN CALCIUM 20 MG/1
20 TABLET, FILM COATED ORAL NIGHTLY
Qty: 90 TABLET | Refills: 3 | Status: SHIPPED | OUTPATIENT
Start: 2020-02-06 | End: 2020-12-10

## 2020-02-06 NOTE — TELEPHONE ENCOUNTER
Patient left a voice message that he needs an rx sent to [facility] for Atorvastatin 20mg once daily #90 with 3 refills.

## 2020-02-20 ENCOUNTER — TELEPHONE (OUTPATIENT)
Dept: FAMILY MEDICINE CLINIC | Facility: CLINIC | Age: 85
End: 2020-02-20

## 2020-02-20 RX ORDER — PANTOPRAZOLE SODIUM 40 MG/1
40 TABLET, DELAYED RELEASE ORAL DAILY
Qty: 90 TABLET | Refills: 1 | Status: SHIPPED | OUTPATIENT
Start: 2020-02-20 | End: 2020-07-06

## 2020-02-20 NOTE — TELEPHONE ENCOUNTER
Patient left a voice message that he needs an rx sent to [facility] for Pantoprazole 40mg once daily - 90 day supply with refills.

## 2020-03-16 RX ORDER — PINDOLOL 5 MG/1
TABLET ORAL
Qty: 180 TABLET | Refills: 0 | Status: SHIPPED | OUTPATIENT
Start: 2020-03-16 | End: 2020-03-20 | Stop reason: SDUPTHER

## 2020-03-20 ENCOUNTER — TELEPHONE (OUTPATIENT)
Dept: FAMILY MEDICINE CLINIC | Facility: CLINIC | Age: 85
End: 2020-03-20

## 2020-03-20 RX ORDER — PINDOLOL 5 MG/1
5 TABLET ORAL 2 TIMES DAILY
Qty: 180 TABLET | Refills: 0 | Status: SHIPPED | OUTPATIENT
Start: 2020-03-20 | End: 2020-03-23 | Stop reason: SDUPTHER

## 2020-03-20 NOTE — TELEPHONE ENCOUNTER
PATIENT IS REQUESTING A REFILL ON   sertraline (ZOLOFT) 50 MG tablet  pindolol (VISKEN) 5 MG tablet    PLEASE SEND RX TO [email or web address] - [place], [place] - [address] [phone] [facility] [phone] FX     PATIENT STATES MEDS CAN BE CALLED IN TO [phone]

## 2020-03-23 ENCOUNTER — TELEPHONE (OUTPATIENT)
Dept: FAMILY MEDICINE CLINIC | Facility: CLINIC | Age: 85
End: 2020-03-23

## 2020-03-23 RX ORDER — PINDOLOL 5 MG/1
5 TABLET ORAL 2 TIMES DAILY
Qty: 180 TABLET | Refills: 0 | Status: SHIPPED | OUTPATIENT
Start: 2020-03-23 | End: 2020-07-06

## 2020-03-23 NOTE — TELEPHONE ENCOUNTER
Pt called and requested refill for pindolol (VISKEN) 5 MG tablet. Pt stated [facility] is out and doesn't know when they will get more . Pt stated he can get it filled at      [facility] #[id number] - [place], [place] - [address] - [phone] [facility] [phone] FX      Pt call back   [phone]

## 2020-05-18 RX ORDER — TRAZODONE HYDROCHLORIDE 50 MG/1
TABLET ORAL
Qty: 90 TABLET | Refills: 0 | Status: SHIPPED | OUTPATIENT
Start: 2020-05-18 | End: 2020-05-22 | Stop reason: SDUPTHER

## 2020-05-22 ENCOUNTER — TELEPHONE (OUTPATIENT)
Dept: FAMILY MEDICINE CLINIC | Facility: CLINIC | Age: 85
End: 2020-05-22

## 2020-05-22 RX ORDER — TRAZODONE HYDROCHLORIDE 50 MG/1
50 TABLET ORAL
Qty: 90 TABLET | Refills: 3 | Status: SHIPPED | OUTPATIENT
Start: 2020-05-22 | End: 2021-05-28

## 2020-05-22 NOTE — TELEPHONE ENCOUNTER
[facility] called to request an rx for patient for Trazodone 50mg one at bedtime #90 with 3 refills.  This is a new rx for them.

## 2020-07-06 RX ORDER — PINDOLOL 5 MG/1
TABLET ORAL
Qty: 180 TABLET | Refills: 0 | Status: SHIPPED | OUTPATIENT
Start: 2020-07-06 | End: 2020-09-04

## 2020-07-06 RX ORDER — PANTOPRAZOLE SODIUM 40 MG/1
40 TABLET, DELAYED RELEASE ORAL DAILY
Qty: 90 TABLET | Refills: 1 | Status: SHIPPED | OUTPATIENT
Start: 2020-07-06 | End: 2020-12-30

## 2020-07-31 ENCOUNTER — TELEPHONE (OUTPATIENT)
Dept: FAMILY MEDICINE CLINIC | Facility: CLINIC | Age: 85
End: 2020-07-31

## 2020-07-31 DIAGNOSIS — Z12.5 PROSTATE CANCER SCREENING: ICD-10-CM

## 2020-07-31 DIAGNOSIS — I10 ESSENTIAL HYPERTENSION: Primary | ICD-10-CM

## 2020-07-31 DIAGNOSIS — E11.9 TYPE 2 DIABETES MELLITUS WITHOUT COMPLICATION, WITHOUT LONG-TERM CURRENT USE OF INSULIN (HCC): ICD-10-CM

## 2020-07-31 NOTE — TELEPHONE ENCOUNTER
Patient is seeing [facility] on 8/5 for his annual Medicare Wellness Exam and coming in under [facility] on 8/3 for the fasting labwork.  Please put lab orders in.

## 2020-08-03 ENCOUNTER — LAB (OUTPATIENT)
Dept: FAMILY MEDICINE CLINIC | Facility: CLINIC | Age: 85
End: 2020-08-03

## 2020-08-03 DIAGNOSIS — E11.9 TYPE 2 DIABETES MELLITUS WITHOUT COMPLICATION, WITHOUT LONG-TERM CURRENT USE OF INSULIN (HCC): ICD-10-CM

## 2020-08-03 DIAGNOSIS — Z12.5 PROSTATE CANCER SCREENING: ICD-10-CM

## 2020-08-03 DIAGNOSIS — I10 ESSENTIAL HYPERTENSION: ICD-10-CM

## 2020-08-03 LAB
ALBUMIN SERPL-MCNC: 4.5 G/DL (ref 3.5–5.2)
ALBUMIN/GLOB SERPL: 1.9 G/DL
ALP SERPL-CCNC: 91 U/L (ref 39–117)
ALT SERPL W P-5'-P-CCNC: 16 U/L (ref 1–41)
ANION GAP SERPL CALCULATED.3IONS-SCNC: 12.3 MMOL/L (ref 5–15)
AST SERPL-CCNC: 16 U/L (ref 1–40)
BACTERIA UR QL AUTO: NORMAL /HPF
BASOPHILS # BLD AUTO: 0.03 10*3/MM3 (ref 0–0.2)
BASOPHILS NFR BLD AUTO: 0.5 % (ref 0–1.5)
BILIRUB SERPL-MCNC: 0.4 MG/DL (ref 0–1.2)
BILIRUB UR QL STRIP: NEGATIVE
BUN SERPL-MCNC: 16 MG/DL (ref 8–23)
BUN/CREAT SERPL: 17.8 (ref 7–25)
CALCIUM SPEC-SCNC: 9.2 MG/DL (ref 8.6–10.5)
CHLORIDE SERPL-SCNC: 102 MMOL/L (ref 98–107)
CHOLEST SERPL-MCNC: 141 MG/DL (ref 0–200)
CLARITY UR: CLEAR
CO2 SERPL-SCNC: 24.7 MMOL/L (ref 22–29)
COLOR UR: YELLOW
CREAT SERPL-MCNC: 0.9 MG/DL (ref 0.76–1.27)
DEPRECATED RDW RBC AUTO: 41 FL (ref 37–54)
EOSINOPHIL # BLD AUTO: 0.07 10*3/MM3 (ref 0–0.4)
EOSINOPHIL NFR BLD AUTO: 1.2 % (ref 0.3–6.2)
ERYTHROCYTE [DISTWIDTH] IN BLOOD BY AUTOMATED COUNT: 11.8 % (ref 12.3–15.4)
GFR SERPL CREATININE-BSD FRML MDRD: 80 ML/MIN/1.73
GLOBULIN UR ELPH-MCNC: 2.4 GM/DL
GLUCOSE SERPL-MCNC: 114 MG/DL (ref 65–99)
GLUCOSE UR STRIP-MCNC: NEGATIVE MG/DL
HBA1C MFR BLD: 6.9 % (ref 3.5–5.6)
HCT VFR BLD AUTO: 40.8 % (ref 37.5–51)
HDLC SERPL-MCNC: 36 MG/DL (ref 40–60)
HGB BLD-MCNC: 14 G/DL (ref 13–17.7)
HGB UR QL STRIP.AUTO: NEGATIVE
HYALINE CASTS UR QL AUTO: NORMAL /LPF
IMM GRANULOCYTES # BLD AUTO: 0.01 10*3/MM3 (ref 0–0.05)
IMM GRANULOCYTES NFR BLD AUTO: 0.2 % (ref 0–0.5)
KETONES UR QL STRIP: NEGATIVE
LDLC SERPL CALC-MCNC: 71 MG/DL (ref 0–100)
LDLC/HDLC SERPL: 1.96 {RATIO}
LEUKOCYTE ESTERASE UR QL STRIP.AUTO: ABNORMAL
LYMPHOCYTES # BLD AUTO: 1.04 10*3/MM3 (ref 0.7–3.1)
LYMPHOCYTES NFR BLD AUTO: 17.9 % (ref 19.6–45.3)
MCH RBC QN AUTO: 32.3 PG (ref 26.6–33)
MCHC RBC AUTO-ENTMCNC: 34.3 G/DL (ref 31.5–35.7)
MCV RBC AUTO: 94.2 FL (ref 79–97)
MONOCYTES # BLD AUTO: 0.63 10*3/MM3 (ref 0.1–0.9)
MONOCYTES NFR BLD AUTO: 10.8 % (ref 5–12)
NEUTROPHILS NFR BLD AUTO: 4.03 10*3/MM3 (ref 1.7–7)
NEUTROPHILS NFR BLD AUTO: 69.4 % (ref 42.7–76)
NITRITE UR QL STRIP: NEGATIVE
NRBC BLD AUTO-RTO: 0 /100 WBC (ref 0–0.2)
PH UR STRIP.AUTO: 5.5 [PH] (ref 5–8)
PLATELET # BLD AUTO: 175 10*3/MM3 (ref 140–450)
PMV BLD AUTO: 10.9 FL (ref 6–12)
POTASSIUM SERPL-SCNC: 3.9 MMOL/L (ref 3.5–5.2)
PROT SERPL-MCNC: 6.9 G/DL (ref 6–8.5)
PROT UR QL STRIP: NEGATIVE
PSA SERPL-MCNC: 12.4 NG/ML (ref 0–4)
RBC # BLD AUTO: 4.33 10*6/MM3 (ref 4.14–5.8)
RBC # UR: NORMAL /HPF
REF LAB TEST METHOD: NORMAL
SODIUM SERPL-SCNC: 139 MMOL/L (ref 136–145)
SP GR UR STRIP: 1.02 (ref 1–1.03)
SQUAMOUS #/AREA URNS HPF: NORMAL /HPF
TRIGL SERPL-MCNC: 172 MG/DL (ref 0–150)
TSH SERPL DL<=0.05 MIU/L-ACNC: 5.85 UIU/ML (ref 0.27–4.2)
UROBILINOGEN UR QL STRIP: ABNORMAL
VLDLC SERPL-MCNC: 34.4 MG/DL (ref 5–40)
WBC # BLD AUTO: 5.81 10*3/MM3 (ref 3.4–10.8)
WBC UR QL AUTO: NORMAL /HPF

## 2020-08-03 PROCEDURE — 80053 COMPREHEN METABOLIC PANEL: CPT | Performed by: FAMILY MEDICINE

## 2020-08-03 PROCEDURE — 84443 ASSAY THYROID STIM HORMONE: CPT | Performed by: FAMILY MEDICINE

## 2020-08-03 PROCEDURE — 36415 COLL VENOUS BLD VENIPUNCTURE: CPT

## 2020-08-03 PROCEDURE — G0103 PSA SCREENING: HCPCS | Performed by: FAMILY MEDICINE

## 2020-08-03 PROCEDURE — 85025 COMPLETE CBC W/AUTO DIFF WBC: CPT | Performed by: FAMILY MEDICINE

## 2020-08-03 PROCEDURE — 81001 URINALYSIS AUTO W/SCOPE: CPT

## 2020-08-03 PROCEDURE — 80061 LIPID PANEL: CPT | Performed by: FAMILY MEDICINE

## 2020-08-03 PROCEDURE — 83036 HEMOGLOBIN GLYCOSYLATED A1C: CPT | Performed by: FAMILY MEDICINE

## 2020-08-05 ENCOUNTER — OFFICE VISIT (OUTPATIENT)
Dept: FAMILY MEDICINE CLINIC | Facility: CLINIC | Age: 85
End: 2020-08-05

## 2020-08-05 VITALS
WEIGHT: 165.4 LBS | RESPIRATION RATE: 12 BRPM | SYSTOLIC BLOOD PRESSURE: 121 MMHG | BODY MASS INDEX: 25.91 KG/M2 | HEART RATE: 70 BPM | DIASTOLIC BLOOD PRESSURE: 70 MMHG | TEMPERATURE: 98.2 F

## 2020-08-05 DIAGNOSIS — R27.0 ATAXIA: ICD-10-CM

## 2020-08-05 DIAGNOSIS — R53.1 WEAKNESS: ICD-10-CM

## 2020-08-05 DIAGNOSIS — Z00.00 ENCOUNTER FOR MEDICARE ANNUAL WELLNESS EXAM: Primary | ICD-10-CM

## 2020-08-05 PROCEDURE — G0439 PPPS, SUBSEQ VISIT: HCPCS | Performed by: FAMILY MEDICINE

## 2020-08-05 NOTE — PROGRESS NOTES
The ABCs of the Annual Wellness Visit  Subsequent Medicare Wellness Visit    Chief Complaint   Patient presents with   • Medicare Wellness-subsequent       Subjective   History of Present Illness:  Mark Busch Jr. is a 86 y.o. male who presents for a Subsequent Medicare Wellness Visit.  Patient tries to walk every other day about three quarters of a mile.  He has had no recent chest pain dizziness or syncope.  He feels like he is breathing okay.  He denies any issues with his bowel or bladder function.  He has no blood in his stool or urine.  His memory has been good and he had takes care of his own transportation financials and ADLs.  He unfortunately is been falling several times while in the yard usually with walking in the yard.  He states his left ankle might just give out.  Is not a syncopal episode.  He has had no head injuries.  He just feels very unsteady in the yard.  He has not fallen in the house yet.  Patient also describes what has sounds like some visual hallucinations.  He states when he goes to bed and he opens his eyes he has some unusual shapes of the drapes it looks like a person at times.  Sometimes he will will see other things within the room at nighttime that he does not really feel over there and he feels like this is been going on for a couple of years.  It is not scary to him it does not bother him.  He sees an optometrist.  He is making me aware of this.  He eats nutritious diet and when he does check his sugars they are normal    HEALTH RISK ASSESSMENT    Recent Hospitalizations:  No hospitalization(s) within the last year.    Current Medical Providers:  Patient Care Team:  Jack Huynh MD as PCP - General  Jack Huynh MD as PCP - Family Medicine  Maggie Swain APRN (Nurse Practitioner)    Smoking Status:  Social History     Tobacco Use   Smoking Status Never Smoker   Smokeless Tobacco Never Used       Alcohol Consumption:  Social History     Substance and Sexual  Activity   Alcohol Use No   • Frequency: Never       Depression Screen:   PHQ-2/PHQ-9 Depression Screening 8/5/2020   Little interest or pleasure in doing things 0   Feeling down, depressed, or hopeless 1   Total Score 1       Fall Risk Screen:  LORRI Fall Risk Assessment was completed, and patient is at HIGH risk for falls. Assessment completed on:8/5/2020    Health Habits and Functional and Cognitive Screening:  Functional & Cognitive Status 8/5/2020   Do you have difficulty preparing food and eating? No   Do you have difficulty bathing yourself, getting dressed or grooming yourself? No   Do you have difficulty using the toilet? No   Do you have difficulty moving around from place to place? No   Do you have trouble with steps or getting out of a bed or a chair? No   Current Diet Well Balanced Diet   Dental Exam Up to date   Eye Exam Up to date   Exercise (times per week) 3 times per week   Current Exercise Activities Include Walking   Do you need help using the phone?  No   Are you deaf or do you have serious difficulty hearing?  Yes   Do you need help with transportation? No   Do you need help shopping? No   Do you need help preparing meals?  No   Do you need help with housework?  No   Do you need help with laundry? No   Do you need help taking your medications? No   Do you need help managing money? No   Do you ever drive or ride in a car without wearing a seat belt? No   Have you felt unusual stress, anger or loneliness in the last month? No   Who do you live with? Child   If you need help, do you have trouble finding someone available to you? No   Do you have difficulty concentrating, remembering or making decisions? No         Does the patient have evidence of cognitive impairment? No    Asprin use counseling:Does not need ASA (and currently is not on it)    Age-appropriate Screening Schedule:  Refer to the list below for future screening recommendations based on patient's age, sex and/or medical conditions.  Orders for these recommended tests are listed in the plan section. The patient has been provided with a written plan.    Health Maintenance   Topic Date Due   • URINE MICROALBUMIN  08/08/1933   • DIABETIC EYE EXAM  05/31/2019   • INFLUENZA VACCINE  08/01/2020   • ZOSTER VACCINE (1 of 2) 08/14/2021 (Originally 8/8/1983)   • HEMOGLOBIN A1C  02/03/2021   • LIPID PANEL  08/03/2021   • TDAP/TD VACCINES (2 - Td) 09/15/2024          The following portions of the patient's history were reviewed and updated as appropriate: allergies, current medications, past family history, past medical history, past social history, past surgical history and problem list.    Outpatient Medications Prior to Visit   Medication Sig Dispense Refill   • atorvastatin (LIPITOR) 20 MG tablet Take 1 tablet by mouth Every Night. 90 tablet 3   • coenzyme Q10 100 MG capsule Take 100 mg by mouth Daily. HELD FOR OR     • diphenhydrAMINE (BENADRYL) 25 mg capsule Take 25 mg by mouth At Night As Needed for Itching.     • Omega-3 Fatty Acids (FISH OIL) 1000 MG capsule capsule FISH OIL 1000 MG CAPS     • ondansetron (ZOFRAN) 8 MG tablet Take 8 mg by mouth Every 8 (Eight) Hours As Needed for Nausea or Vomiting.     • OXcarbazepine (TRILEPTAL) 300 MG tablet TAKE 1 TABLET BY MOUTH TWICE DAILY 180 tablet 3   • pantoprazole (PROTONIX) 40 MG EC tablet TAKE 1 TABLET BY MOUTH  DAILY 90 tablet 1   • pindolol (VISKEN) 5 MG tablet TAKE 1 TABLET BY MOUTH  TWICE DAILY 180 tablet 0   • sertraline (ZOLOFT) 50 MG tablet One and a half daily 135 tablet 4   • traZODone (DESYREL) 50 MG tablet Take 1 tablet by mouth every night at bedtime. 90 tablet 3     No facility-administered medications prior to visit.        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 Patient has an advance directive (not in EMR), copy requested.    Review of Systems   Constitutional: Positive for fatigue. Negative for chills and fever.   HENT: Negative for nosebleeds.    Respiratory: Negative for chest tightness and shortness of breath.    Cardiovascular: Negative for chest pain and palpitations.   Gastrointestinal: Negative for abdominal pain and blood in stool.   Genitourinary: Negative for dysuria and hematuria.   Musculoskeletal: Positive for arthralgias and gait problem.   Neurological: Negative for dizziness, seizures and syncope.   Psychiatric/Behavioral: Positive for hallucinations. Negative for confusion.       Compared to one year ago, the patient feels his physical health is the same.  Compared to one year ago, the patient feels his mental health is the same.    Reviewed chart for potential of high risk medication in the elderly: yes  Reviewed chart for potential of harmful drug interactions in the elderly:yes    Objective         Vitals:    08/05/20 1545   BP: 121/70   Pulse: 70   Resp: 12   Temp: 98.2 °F (36.8 °C)   Weight: 75 kg (165 lb 6.4 oz)   PainSc: 0-No pain       Body mass index is 25.91 kg/m².  Discussed the patient's BMI with him. The BMI is in the acceptable range.    Physical Exam   Constitutional: He is oriented to person, place, and time. He appears well-developed and well-nourished. No distress.   HENT:   Head: Normocephalic and atraumatic.   Nose: Nose normal.   Mouth/Throat: Oropharynx is  clear and moist.   Eyes: Pupils are equal, round, and reactive to light. Conjunctivae, EOM and lids are normal.   Neck: Normal range of motion. Neck supple. No JVD present. Carotid bruit is not present. No thyroid mass and no thyromegaly present.   Cardiovascular: Normal rate, regular rhythm, normal heart sounds and intact distal pulses.   Pulmonary/Chest: Effort normal and breath sounds normal.   Abdominal: Soft. Bowel sounds are normal. He exhibits no distension and no mass. There is no tenderness. There is no guarding.   Ostomy intact   Musculoskeletal:   He has 1+ edema in his lower extremities.  He has bunions bilaterally.  DP pulses are palpable.  Sensations intact.  No skin breakdown   Neurological: He is alert and oriented to person, place, and time.   He seems to have no real proximal leg weakness, gets out of a chair pretty easy without pushing off.  He does have some ataxia and he certainly cannot do to heel-to-toe, his Romberg is not too bad.   Skin: Skin is warm and dry.   Psychiatric: He has a normal mood and affect. His speech is normal and behavior is normal. He is attentive.   Nursing note and vitals reviewed.      Lab Results   Component Value Date    TRIG 172 (H) 08/03/2020    HDL 36 (L) 08/03/2020    LDL 71 08/03/2020    VLDL 34.4 08/03/2020    HGBA1C 6.9 (H) 08/03/2020        Assessment/Plan   Medicare Risks and Personalized Health Plan  CMS Preventative Services Quick Reference  Abdominal Aortic Aneurysm Screening  Advance Directive Discussion  Cardiovascular risk  Colon Cancer Screening  Dementia/Memory   Depression/Dysphoria  Diabetic Lab Screening   Fall Risk  Glaucoma Risk  Hearing Problem  Immunizations Discussed/Encouraged (specific immunizations; adacel Tdap, Hepatitis B Vaccine/Series, Pneumococcal 23, Prevnar and Shingrix )  Inadequate Social Support, Isolation, Loneliness, Lack of Transportation, Financial Difficulties, or Caregiver Stress   Inactivity/Sedentary  Lung Cancer  Risk  Obesity/Overweight   Prostate Cancer Screening     The above risks/problems have been discussed with the patient.  Pertinent information has been shared with the patient in the After Visit Summary.  Follow up plans and orders are seen below in the Assessment/Plan Section.    Diagnoses and all orders for this visit:    1. Encounter for Medicare annual wellness exam (Primary)  Assessment & Plan:  Continue routine aerobic exercise for cardiovascular health.  I think he would benefit from physical therapy to do some strengthening and balance.  He definitely needs an assistance device when he is outside I recommend he get a walking stick or cane to steady himself on the uneven ground.  Nutritious diet high in fiber and vegetable, low in carbohydrate, continue to eat leaner cuts of meat higher amounts fish.  Immunizations were discussed with the patient as well      2. Ataxia  Assessment & Plan:  I believe he would benefit from physical therapy for balance and strengthening of his overall musculature    Orders:  -     Ambulatory Referral to Physical Therapy    3. Weakness  -     Ambulatory Referral to Physical Therapy    Follow Up:  Return in about 3 months (around 11/5/2020) for Recheck.     An After Visit Summary and PPPS were given to the patient.       Advance Care Planning   ACP discussion was held with the patient during this visit. Patient has an advance directive (not in EMR), copy requested.

## 2020-08-06 PROBLEM — R27.0 ATAXIA: Status: ACTIVE | Noted: 2020-08-06

## 2020-08-06 NOTE — ASSESSMENT & PLAN NOTE
I believe he would benefit from physical therapy for balance and strengthening of his overall musculature

## 2020-08-06 NOTE — ASSESSMENT & PLAN NOTE
Continue routine aerobic exercise for cardiovascular health.  I think he would benefit from physical therapy to do some strengthening and balance.  He definitely needs an assistance device when he is outside I recommend he get a walking stick or cane to steady himself on the uneven ground.  Nutritious diet high in fiber and vegetable, low in carbohydrate, continue to eat leaner cuts of meat higher amounts fish.  Immunizations were discussed with the patient as well

## 2020-09-04 RX ORDER — PINDOLOL 5 MG/1
TABLET ORAL
Qty: 60 TABLET | Refills: 2 | Status: SHIPPED | OUTPATIENT
Start: 2020-09-04 | End: 2020-12-16 | Stop reason: SDUPTHER

## 2020-09-21 RX ORDER — OXCARBAZEPINE 300 MG/1
TABLET, FILM COATED ORAL
Qty: 180 TABLET | Refills: 0 | Status: SHIPPED | OUTPATIENT
Start: 2020-09-21 | End: 2020-12-23

## 2020-11-09 ENCOUNTER — LAB (OUTPATIENT)
Dept: FAMILY MEDICINE CLINIC | Facility: CLINIC | Age: 85
End: 2020-11-09

## 2020-11-09 ENCOUNTER — OFFICE VISIT (OUTPATIENT)
Dept: FAMILY MEDICINE CLINIC | Facility: CLINIC | Age: 85
End: 2020-11-09

## 2020-11-09 VITALS
OXYGEN SATURATION: 97 % | RESPIRATION RATE: 16 BRPM | WEIGHT: 166 LBS | HEART RATE: 69 BPM | SYSTOLIC BLOOD PRESSURE: 126 MMHG | BODY MASS INDEX: 26 KG/M2 | TEMPERATURE: 96.6 F | DIASTOLIC BLOOD PRESSURE: 64 MMHG

## 2020-11-09 DIAGNOSIS — E11.9 TYPE 2 DIABETES MELLITUS WITHOUT COMPLICATION, WITHOUT LONG-TERM CURRENT USE OF INSULIN (HCC): Primary | ICD-10-CM

## 2020-11-09 DIAGNOSIS — I10 ESSENTIAL HYPERTENSION: ICD-10-CM

## 2020-11-09 DIAGNOSIS — M50.90 DISORDER OF INTERVERTEBRAL DISC OF CERVICAL SPINE: ICD-10-CM

## 2020-11-09 LAB — ALBUMIN UR-MCNC: <1.2 MG/DL

## 2020-11-09 PROCEDURE — 99213 OFFICE O/P EST LOW 20 MIN: CPT | Performed by: FAMILY MEDICINE

## 2020-11-09 PROCEDURE — 82043 UR ALBUMIN QUANTITATIVE: CPT | Performed by: FAMILY MEDICINE

## 2020-11-09 RX ORDER — TRIAMCINOLONE ACETONIDE 0.25 MG/ML
LOTION TOPICAL
COMMUNITY
Start: 2020-08-25 | End: 2021-03-09

## 2020-11-09 NOTE — PROGRESS NOTES
Rooming Tab(CC,VS,Pt Hx,Fall Screen)  Chief Complaint   Patient presents with   • Hypertension       Subjective 87-year-old here for follow-up on his hypertension.  Patient is feeling relatively well and has no complaints.  He has a history of diabetes but is not taking any medication and is eating about what he wants.  His blood sugar when he checks it is normal, last one was 114.  Patient had neck pain and he never went to physical therapy because of COVID-19 and now is really not interested in physical therapy as he feels like it is controllable.  He denies any chest pain or dizziness or syncope.  He is feeling relatively relatively well and his stress seems to be under good control.    I have reviewed and updated his medications, medical history and problem list during today's office visit.     Patient Care Team:  Jack Huynh MD as PCP - General  Jack Huynh MD as PCP - Family Medicine  Maggie Swain APRN (Nurse Practitioner)    Problem List Tab  Medications Tab  Synopsis Tab  Chart Review Tab  Care Everywhere Tab  Immunizations Tab  Patient History Tab    Social History     Tobacco Use   • Smoking status: Never Smoker   • Smokeless tobacco: Never Used   Substance Use Topics   • Alcohol use: No     Frequency: Never       Review of Systems   Constitutional: Positive for fatigue. Negative for chills and fever.   HENT: Negative for congestion and nosebleeds.    Eyes: Negative for double vision.   Respiratory: Negative for chest tightness and shortness of breath.    Cardiovascular: Negative for chest pain and palpitations.   Gastrointestinal: Negative for abdominal pain and blood in stool.   Genitourinary: Negative for dysuria and hematuria.   Musculoskeletal: Positive for back pain and gait problem.   Neurological: Positive for dizziness and weakness. Negative for syncope and light-headedness.   Psychiatric/Behavioral: Positive for stress. Negative for self-injury, suicidal ideas and depressed  mood.       Objective     Rooming Tab(CC,VS,Pt Hx,Fall Screen)  /64 (BP Location: Right arm)   Pulse 69   Temp 96.6 °F (35.9 °C)   Resp 16   Wt 75.3 kg (166 lb)   SpO2 97%   BMI 26.00 kg/m²     Body mass index is 26 kg/m².    Physical Exam  Vitals signs and nursing note reviewed.   Constitutional:       General: He is not in acute distress.     Appearance: Normal appearance. He is well-developed and normal weight.      Comments: Elderly male in no acute distress   HENT:      Head: Normocephalic and atraumatic.      Right Ear: Tympanic membrane and ear canal normal.      Left Ear: Tympanic membrane and ear canal normal.      Nose: Nose normal.      Mouth/Throat:      Mouth: Mucous membranes are moist.      Pharynx: Oropharynx is clear.   Eyes:      General: Lids are normal.      Extraocular Movements: Extraocular movements intact.      Conjunctiva/sclera: Conjunctivae normal.      Pupils: Pupils are equal, round, and reactive to light.   Neck:      Musculoskeletal: Normal range of motion and neck supple.      Thyroid: No thyroid mass or thyromegaly.      Vascular: No carotid bruit or JVD.      Trachea: Trachea normal.      Comments: No carotid bruits  Cardiovascular:      Rate and Rhythm: Normal rate and regular rhythm.      Pulses: Normal pulses.      Heart sounds: Normal heart sounds.   Pulmonary:      Effort: Pulmonary effort is normal.      Breath sounds: Normal breath sounds.   Musculoskeletal:      Comments: No c/c/e  DP pulses palpable, sensation is intact.  No diabetic foot ulcers or skin breakdown   Skin:     General: Skin is warm and dry.   Neurological:      Mental Status: He is alert and oriented to person, place, and time.      Cranial Nerves: No cranial nerve deficit.      Comments: Ataxic   Psychiatric:         Attention and Perception: He is attentive.         Mood and Affect: Mood normal.         Speech: Speech normal.         Behavior: Behavior normal.         Thought Content: Thought  content normal.          Statin Choice Calculator  Data Reviewed:               Lab Results   Component Value Date    MICROALBUR <1.2 11/09/2020      Assessment/Plan   Order Review Tab  Health Maintenance Tab  Patient Plan/Order Tab  Diagnoses and all orders for this visit:    1. Type 2 diabetes mellitus without complication, without long-term current use of insulin (CMS/[facility]) (Primary)  Assessment & Plan:  Diabetes is improving with lifestyle modifications.   Continue current treatment regimen.  Reminded to bring in blood sugar diary at next visit.  Dietary recommendations for ADA diet.  Regular aerobic exercise.  Discussed ways to avoid symptomatic hypoglycemia.  Discussed foot care.  Diabetes will be reassessed in 6 months.    Orders:  -     MicroAlbumin, Urine, Random - Urine, Clean Catch    2. Essential hypertension  Assessment & Plan:  Hypertension is improving with treatment.  Continue current treatment regimen.  Dietary sodium restriction.  Regular aerobic exercise.  Continue current medications.  Blood pressure will be reassessed at the next regular appointment.      3. 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

## 2020-11-11 NOTE — ASSESSMENT & PLAN NOTE
Diabetes is improving with lifestyle modifications.   Continue current treatment regimen.  Reminded to bring in blood sugar diary at next visit.  Dietary recommendations for ADA diet.  Regular aerobic exercise.  Discussed ways to avoid symptomatic hypoglycemia.  Discussed foot care.  Diabetes will be reassessed in 6 months.

## 2020-12-10 RX ORDER — ATORVASTATIN CALCIUM 20 MG/1
20 TABLET, FILM COATED ORAL NIGHTLY
Qty: 90 TABLET | Refills: 3 | Status: SHIPPED | OUTPATIENT
Start: 2020-12-10 | End: 2021-08-04

## 2020-12-16 NOTE — TELEPHONE ENCOUNTER
Caller: Mark Busch Jr.    Relationship: Self    Best call back number: [phone]    Medication needed:   Requested Prescriptions     Pending Prescriptions Disp Refills   • pindolol (VISKEN) 5 MG tablet 60 tablet 2     Sig: Take 1 tablet by mouth 2 (Two) Times a Day.       When do you need the refill by: 12.17.20    What details did the patient provide when requesting the medication: PATIENT IS NORMALLY PRESCRIBED 180 PILLS BUT PHARMACY ONLY HAS 60 PILLS ON HAND AT A TIME AND NEEDS REFILL IN NEXT 5 DAYS REMAINING    Does the patient have less than a 3 day supply:  [] Yes  [x] No    What is the patient's preferred pharmacy: [facility]/PHARMACY #[id number] - [facility] [place] [address] [phone] [place] [phone]

## 2020-12-17 RX ORDER — PINDOLOL 5 MG/1
5 TABLET ORAL 2 TIMES DAILY
Qty: 60 TABLET | Refills: 6 | Status: SHIPPED | OUTPATIENT
Start: 2020-12-17 | End: 2021-03-18 | Stop reason: SDUPTHER

## 2020-12-23 RX ORDER — OXCARBAZEPINE 300 MG/1
TABLET, FILM COATED ORAL
Qty: 180 TABLET | Refills: 0 | Status: SHIPPED | OUTPATIENT
Start: 2020-12-23 | End: 2021-03-26 | Stop reason: SDUPTHER

## 2020-12-30 RX ORDER — PANTOPRAZOLE SODIUM 40 MG/1
40 TABLET, DELAYED RELEASE ORAL DAILY
Qty: 90 TABLET | Refills: 3 | Status: SHIPPED | OUTPATIENT
Start: 2020-12-30 | End: 2021-08-04

## 2021-01-01 ENCOUNTER — OFFICE VISIT (OUTPATIENT)
Dept: FAMILY MEDICINE CLINIC | Facility: CLINIC | Age: 86
End: 2021-01-01

## 2021-01-01 ENCOUNTER — LAB (OUTPATIENT)
Dept: FAMILY MEDICINE CLINIC | Facility: CLINIC | Age: 86
End: 2021-01-01

## 2021-01-01 VITALS
HEIGHT: 66 IN | HEART RATE: 73 BPM | SYSTOLIC BLOOD PRESSURE: 138 MMHG | OXYGEN SATURATION: 99 % | WEIGHT: 161 LBS | RESPIRATION RATE: 16 BRPM | BODY MASS INDEX: 25.88 KG/M2 | DIASTOLIC BLOOD PRESSURE: 72 MMHG | TEMPERATURE: 97.5 F

## 2021-01-01 DIAGNOSIS — E78.2 MIXED HYPERLIPIDEMIA: ICD-10-CM

## 2021-01-01 DIAGNOSIS — I10 ESSENTIAL HYPERTENSION: ICD-10-CM

## 2021-01-01 DIAGNOSIS — R29.898 ANKLE WEAKNESS: ICD-10-CM

## 2021-01-01 DIAGNOSIS — Z12.5 ENCOUNTER FOR SCREENING FOR MALIGNANT NEOPLASM OF PROSTATE: ICD-10-CM

## 2021-01-01 DIAGNOSIS — R97.20 ELEVATED PSA: ICD-10-CM

## 2021-01-01 DIAGNOSIS — E55.9 VITAMIN D DEFICIENCY, UNSPECIFIED: ICD-10-CM

## 2021-01-01 DIAGNOSIS — R56.9 SEIZURE-LIKE ACTIVITY (HCC): ICD-10-CM

## 2021-01-01 DIAGNOSIS — K21.9 GASTROESOPHAGEAL REFLUX DISEASE, UNSPECIFIED WHETHER ESOPHAGITIS PRESENT: Chronic | ICD-10-CM

## 2021-01-01 DIAGNOSIS — Z85.048 HISTORY OF RECTAL CANCER: ICD-10-CM

## 2021-01-01 DIAGNOSIS — Z00.00 MEDICARE ANNUAL WELLNESS VISIT, SUBSEQUENT: Primary | ICD-10-CM

## 2021-01-01 DIAGNOSIS — E03.9 HYPOTHYROIDISM, UNSPECIFIED TYPE: Chronic | ICD-10-CM

## 2021-01-01 DIAGNOSIS — N32.81 OVERACTIVE BLADDER: ICD-10-CM

## 2021-01-01 DIAGNOSIS — L60.8 BLACK NAILS: ICD-10-CM

## 2021-01-01 DIAGNOSIS — E11.9 TYPE 2 DIABETES MELLITUS WITHOUT COMPLICATION, WITHOUT LONG-TERM CURRENT USE OF INSULIN (HCC): ICD-10-CM

## 2021-01-01 LAB
25(OH)D3 SERPL-MCNC: 20.8 NG/ML
ALBUMIN SERPL-MCNC: 4.9 G/DL (ref 3.5–5.2)
ALBUMIN UR-MCNC: 1.4 MG/DL
ALBUMIN/GLOB SERPL: 2 G/DL
ALP SERPL-CCNC: 107 U/L (ref 39–117)
ALT SERPL W P-5'-P-CCNC: 14 U/L (ref 1–41)
ANION GAP SERPL CALCULATED.3IONS-SCNC: 9.3 MMOL/L (ref 5–15)
AST SERPL-CCNC: 19 U/L (ref 1–40)
BASOPHILS # BLD AUTO: 0.02 10*3/MM3 (ref 0–0.2)
BASOPHILS NFR BLD AUTO: 0.4 % (ref 0–1.5)
BILIRUB SERPL-MCNC: 0.5 MG/DL (ref 0–1.2)
BILIRUB UR QL STRIP: NEGATIVE
BUN SERPL-MCNC: 18 MG/DL (ref 8–23)
BUN/CREAT SERPL: 19.4 (ref 7–25)
CALCIUM SPEC-SCNC: 9.6 MG/DL (ref 8.6–10.5)
CHLORIDE SERPL-SCNC: 101 MMOL/L (ref 98–107)
CHOLEST SERPL-MCNC: 166 MG/DL (ref 0–200)
CLARITY UR: CLEAR
CO2 SERPL-SCNC: 28.7 MMOL/L (ref 22–29)
COLOR UR: YELLOW
CREAT SERPL-MCNC: 0.93 MG/DL (ref 0.76–1.27)
CREAT UR-MCNC: 93.2 MG/DL
DEPRECATED RDW RBC AUTO: 39.8 FL (ref 37–54)
EOSINOPHIL # BLD AUTO: 0.05 10*3/MM3 (ref 0–0.4)
EOSINOPHIL NFR BLD AUTO: 1 % (ref 0.3–6.2)
ERYTHROCYTE [DISTWIDTH] IN BLOOD BY AUTOMATED COUNT: 11.7 % (ref 12.3–15.4)
GFR SERPL CREATININE-BSD FRML MDRD: 77 ML/MIN/1.73
GLOBULIN UR ELPH-MCNC: 2.5 GM/DL
GLUCOSE SERPL-MCNC: 118 MG/DL (ref 65–99)
GLUCOSE UR STRIP-MCNC: NEGATIVE MG/DL
HBA1C MFR BLD: 7 % (ref 3.5–5.6)
HCT VFR BLD AUTO: 41.8 % (ref 37.5–51)
HDLC SERPL-MCNC: 42 MG/DL (ref 40–60)
HGB BLD-MCNC: 14 G/DL (ref 13–17.7)
HGB UR QL STRIP.AUTO: NEGATIVE
IMM GRANULOCYTES # BLD AUTO: 0.02 10*3/MM3 (ref 0–0.05)
IMM GRANULOCYTES NFR BLD AUTO: 0.4 % (ref 0–0.5)
KETONES UR QL STRIP: NEGATIVE
LDLC SERPL CALC-MCNC: 97 MG/DL (ref 0–100)
LDLC/HDLC SERPL: 2.22 {RATIO}
LEUKOCYTE ESTERASE UR QL STRIP.AUTO: NEGATIVE
LYMPHOCYTES # BLD AUTO: 0.74 10*3/MM3 (ref 0.7–3.1)
LYMPHOCYTES NFR BLD AUTO: 14.3 % (ref 19.6–45.3)
MCH RBC QN AUTO: 31.4 PG (ref 26.6–33)
MCHC RBC AUTO-ENTMCNC: 33.5 G/DL (ref 31.5–35.7)
MCV RBC AUTO: 93.7 FL (ref 79–97)
MICROALBUMIN/CREAT UR: 15 MG/G
MONOCYTES # BLD AUTO: 0.57 10*3/MM3 (ref 0.1–0.9)
MONOCYTES NFR BLD AUTO: 11 % (ref 5–12)
NEUTROPHILS NFR BLD AUTO: 3.77 10*3/MM3 (ref 1.7–7)
NEUTROPHILS NFR BLD AUTO: 72.9 % (ref 42.7–76)
NITRITE UR QL STRIP: NEGATIVE
NRBC BLD AUTO-RTO: 0 /100 WBC (ref 0–0.2)
PH UR STRIP.AUTO: 5.5 [PH] (ref 5–8)
PLATELET # BLD AUTO: 178 10*3/MM3 (ref 140–450)
PMV BLD AUTO: 10.3 FL (ref 6–12)
POTASSIUM SERPL-SCNC: 4.5 MMOL/L (ref 3.5–5.2)
PROT SERPL-MCNC: 7.4 G/DL (ref 6–8.5)
PROT UR QL STRIP: NEGATIVE
PSA SERPL-MCNC: 14.4 NG/ML (ref 0–4)
RBC # BLD AUTO: 4.46 10*6/MM3 (ref 4.14–5.8)
SODIUM SERPL-SCNC: 139 MMOL/L (ref 136–145)
SP GR UR STRIP: 1.02 (ref 1–1.03)
T4 FREE SERPL-MCNC: 1.04 NG/DL (ref 0.93–1.7)
TRIGL SERPL-MCNC: 153 MG/DL (ref 0–150)
TSH SERPL DL<=0.05 MIU/L-ACNC: 3.71 UIU/ML (ref 0.27–4.2)
UROBILINOGEN UR QL STRIP: NORMAL
VIT B12 BLD-MCNC: 522 PG/ML (ref 211–946)
VLDLC SERPL-MCNC: 27 MG/DL (ref 5–40)
WBC NRBC COR # BLD: 5.17 10*3/MM3 (ref 3.4–10.8)

## 2021-01-01 PROCEDURE — 36415 COLL VENOUS BLD VENIPUNCTURE: CPT | Performed by: FAMILY MEDICINE

## 2021-01-01 PROCEDURE — 82607 VITAMIN B-12: CPT | Performed by: FAMILY MEDICINE

## 2021-01-01 PROCEDURE — 99214 OFFICE O/P EST MOD 30 MIN: CPT | Performed by: FAMILY MEDICINE

## 2021-01-01 PROCEDURE — 83036 HEMOGLOBIN GLYCOSYLATED A1C: CPT | Performed by: FAMILY MEDICINE

## 2021-01-01 PROCEDURE — 84443 ASSAY THYROID STIM HORMONE: CPT | Performed by: FAMILY MEDICINE

## 2021-01-01 PROCEDURE — 82306 VITAMIN D 25 HYDROXY: CPT | Performed by: FAMILY MEDICINE

## 2021-01-01 PROCEDURE — 1160F RVW MEDS BY RX/DR IN RCRD: CPT | Performed by: FAMILY MEDICINE

## 2021-01-01 PROCEDURE — G0103 PSA SCREENING: HCPCS | Performed by: FAMILY MEDICINE

## 2021-01-01 PROCEDURE — G0439 PPPS, SUBSEQ VISIT: HCPCS | Performed by: FAMILY MEDICINE

## 2021-01-01 PROCEDURE — 80053 COMPREHEN METABOLIC PANEL: CPT | Performed by: FAMILY MEDICINE

## 2021-01-01 PROCEDURE — 1170F FXNL STATUS ASSESSED: CPT | Performed by: FAMILY MEDICINE

## 2021-01-01 PROCEDURE — 85025 COMPLETE CBC W/AUTO DIFF WBC: CPT | Performed by: FAMILY MEDICINE

## 2021-01-01 PROCEDURE — 80061 LIPID PANEL: CPT | Performed by: FAMILY MEDICINE

## 2021-01-01 PROCEDURE — 82043 UR ALBUMIN QUANTITATIVE: CPT | Performed by: FAMILY MEDICINE

## 2021-01-01 PROCEDURE — 81003 URINALYSIS AUTO W/O SCOPE: CPT | Performed by: FAMILY MEDICINE

## 2021-01-01 PROCEDURE — 84439 ASSAY OF FREE THYROXINE: CPT | Performed by: FAMILY MEDICINE

## 2021-01-01 PROCEDURE — 82570 ASSAY OF URINE CREATININE: CPT | Performed by: FAMILY MEDICINE

## 2021-01-01 RX ORDER — ATORVASTATIN CALCIUM 20 MG/1
20 TABLET, FILM COATED ORAL DAILY
Qty: 90 TABLET | Refills: 1 | Status: SHIPPED | OUTPATIENT
Start: 2021-01-01 | End: 2022-01-01

## 2021-01-01 RX ORDER — ERGOCALCIFEROL 1.25 MG/1
50000 CAPSULE ORAL WEEKLY
Qty: 12 CAPSULE | Refills: 1 | Status: SHIPPED | OUTPATIENT
Start: 2021-01-01 | End: 2022-01-01

## 2021-01-01 RX ORDER — PANTOPRAZOLE SODIUM 40 MG/1
40 TABLET, DELAYED RELEASE ORAL DAILY
Qty: 90 TABLET | Refills: 1 | Status: SHIPPED | OUTPATIENT
Start: 2021-01-01 | End: 2021-01-01 | Stop reason: SDUPTHER

## 2021-01-01 RX ORDER — TRAZODONE HYDROCHLORIDE 50 MG/1
50 TABLET ORAL NIGHTLY
Qty: 90 TABLET | Refills: 0 | Status: SHIPPED | OUTPATIENT
Start: 2021-01-01 | End: 2022-01-01

## 2021-01-01 RX ORDER — LEVOTHYROXINE SODIUM 0.03 MG/1
25 TABLET ORAL
Qty: 90 TABLET | Refills: 1 | Status: SHIPPED | OUTPATIENT
Start: 2021-01-01 | End: 2022-01-01

## 2021-01-01 RX ORDER — PANTOPRAZOLE SODIUM 40 MG/1
40 TABLET, DELAYED RELEASE ORAL DAILY
Qty: 90 TABLET | Refills: 1 | Status: SHIPPED | OUTPATIENT
Start: 2021-01-01 | End: 2022-01-01 | Stop reason: DRUGHIGH

## 2021-01-01 RX ORDER — OXCARBAZEPINE 300 MG/1
TABLET, FILM COATED ORAL
Qty: 180 TABLET | Refills: 0 | Status: SHIPPED | OUTPATIENT
Start: 2021-01-01 | End: 2022-01-01

## 2021-01-11 ENCOUNTER — OFFICE VISIT (OUTPATIENT)
Dept: FAMILY MEDICINE CLINIC | Facility: CLINIC | Age: 86
End: 2021-01-11

## 2021-01-11 ENCOUNTER — LAB (OUTPATIENT)
Dept: FAMILY MEDICINE CLINIC | Facility: CLINIC | Age: 86
End: 2021-01-11

## 2021-01-11 DIAGNOSIS — I10 ESSENTIAL HYPERTENSION: Primary | ICD-10-CM

## 2021-01-11 DIAGNOSIS — R79.89 ELEVATED TSH: ICD-10-CM

## 2021-01-11 DIAGNOSIS — F33.42 RECURRENT MAJOR DEPRESSIVE DISORDER, IN FULL REMISSION (HCC): ICD-10-CM

## 2021-01-11 DIAGNOSIS — E03.9 HYPOTHYROIDISM, UNSPECIFIED TYPE: ICD-10-CM

## 2021-01-11 DIAGNOSIS — E11.9 TYPE 2 DIABETES MELLITUS WITHOUT COMPLICATION, WITHOUT LONG-TERM CURRENT USE OF INSULIN (HCC): ICD-10-CM

## 2021-01-11 LAB
ALBUMIN SERPL-MCNC: 4.8 G/DL (ref 3.5–5.2)
ALBUMIN/GLOB SERPL: 2 G/DL
ALP SERPL-CCNC: 99 U/L (ref 39–117)
ALT SERPL W P-5'-P-CCNC: 16 U/L (ref 1–41)
ANION GAP SERPL CALCULATED.3IONS-SCNC: 9.5 MMOL/L (ref 5–15)
AST SERPL-CCNC: 17 U/L (ref 1–40)
BILIRUB SERPL-MCNC: 0.4 MG/DL (ref 0–1.2)
BUN SERPL-MCNC: 18 MG/DL (ref 8–23)
BUN/CREAT SERPL: 20 (ref 7–25)
CALCIUM SPEC-SCNC: 9.5 MG/DL (ref 8.6–10.5)
CHLORIDE SERPL-SCNC: 102 MMOL/L (ref 98–107)
CHOLEST SERPL-MCNC: 167 MG/DL (ref 0–200)
CO2 SERPL-SCNC: 27.5 MMOL/L (ref 22–29)
CREAT SERPL-MCNC: 0.9 MG/DL (ref 0.76–1.27)
GFR SERPL CREATININE-BSD FRML MDRD: 80 ML/MIN/1.73
GLOBULIN UR ELPH-MCNC: 2.4 GM/DL
GLUCOSE SERPL-MCNC: 135 MG/DL (ref 65–99)
HBA1C MFR BLD: 7.5 % (ref 3.5–5.6)
HDLC SERPL-MCNC: 42 MG/DL (ref 40–60)
LDLC SERPL CALC-MCNC: 95 MG/DL (ref 0–100)
LDLC/HDLC SERPL: 2.14 {RATIO}
POTASSIUM SERPL-SCNC: 4.3 MMOL/L (ref 3.5–5.2)
PROT SERPL-MCNC: 7.2 G/DL (ref 6–8.5)
SODIUM SERPL-SCNC: 139 MMOL/L (ref 136–145)
T3 SERPL-MCNC: 94.5 NG/DL (ref 80–200)
T4 FREE SERPL-MCNC: 0.91 NG/DL (ref 0.93–1.7)
TRIGL SERPL-MCNC: 176 MG/DL (ref 0–150)
TSH SERPL DL<=0.05 MIU/L-ACNC: 6.86 UIU/ML (ref 0.27–4.2)
VLDLC SERPL-MCNC: 30 MG/DL (ref 5–40)

## 2021-01-11 PROCEDURE — 83036 HEMOGLOBIN GLYCOSYLATED A1C: CPT | Performed by: FAMILY MEDICINE

## 2021-01-11 PROCEDURE — 84439 ASSAY OF FREE THYROXINE: CPT | Performed by: FAMILY MEDICINE

## 2021-01-11 PROCEDURE — 99214 OFFICE O/P EST MOD 30 MIN: CPT | Performed by: FAMILY MEDICINE

## 2021-01-11 PROCEDURE — 80061 LIPID PANEL: CPT | Performed by: FAMILY MEDICINE

## 2021-01-11 PROCEDURE — 84443 ASSAY THYROID STIM HORMONE: CPT | Performed by: FAMILY MEDICINE

## 2021-01-11 PROCEDURE — 84480 ASSAY TRIIODOTHYRONINE (T3): CPT | Performed by: FAMILY MEDICINE

## 2021-01-11 PROCEDURE — 80053 COMPREHEN METABOLIC PANEL: CPT | Performed by: FAMILY MEDICINE

## 2021-01-11 RX ORDER — LEVOTHYROXINE SODIUM 0.03 MG/1
25 TABLET ORAL DAILY
Qty: 90 TABLET | Refills: 3 | Status: SHIPPED | OUTPATIENT
Start: 2021-01-11 | End: 2021-08-04

## 2021-01-11 NOTE — PROGRESS NOTES
Rooming Tab(CC,VS,Pt Hx,Fall Screen)  Chief Complaint   Patient presents with   • Hypertension       Subjective 87-year-old here for follow-up on his hypertension.  The patient feels like his blood pressure is better at home than here.  He feels good.  He denies any chest pain.  He has no difficulty with his breathing.  He has had no dizziness or syncope.  His daughter lives with him and he helps take care of her and she is somewhat him as well.  He denies any orthopnea or PND.  He has had no seizure-like activity.  He has a history of a mildly elevated TSH and were going to recheck that again today.  He has no issues with his memory.  He has had no falling episodes.  He takes care of his own ADLs transportation and financials.    I have reviewed and updated his medications, medical history and problem list during today's office visit.     Patient Care Team:  Jack Huynh MD as PCP - General  Jack Huynh MD as PCP - Family Medicine  Maggie Swain APRN (Nurse Practitioner)    Problem List Tab  Medications Tab  Synopsis Tab  Chart Review Tab  Care Everywhere Tab  Immunizations Tab  Patient History Tab    Social History     Tobacco Use   • Smoking status: Never Smoker   • Smokeless tobacco: Never Used   Substance Use Topics   • Alcohol use: No     Frequency: Never       Review of Systems   Constitutional: Negative for chills, fatigue and fever.   HENT: Negative for nosebleeds.    Eyes: Negative for blurred vision and double vision.   Respiratory: Negative for chest tightness and shortness of breath.    Cardiovascular: Negative for chest pain and palpitations.   Gastrointestinal: Negative for abdominal pain and blood in stool.   Endocrine: Negative for polydipsia and polyuria.   Genitourinary: Negative for dysuria and hematuria.   Neurological: Negative for dizziness, syncope, memory problem and confusion.   Psychiatric/Behavioral: Positive for stress. Negative for suicidal ideas and depressed mood.        Objective     Rooming Tab(CC,VS,Pt Hx,Fall Screen)  /80   Pulse 74   Temp 96.6 °F (35.9 °C)   Resp 12   Wt 76.3 kg (168 lb 3.2 oz)   BMI 26.34 kg/m²     Body mass index is 26.34 kg/m².    Physical Exam  Vitals signs and nursing note reviewed.   Constitutional:       General: He is not in acute distress.     Appearance: He is well-developed and normal weight.      Comments: Elderly pleasant male in no acute distress   HENT:      Head: Normocephalic and atraumatic.      Right Ear: Tympanic membrane and ear canal normal.      Left Ear: Tympanic membrane and ear canal normal.      Nose: Nose normal.      Mouth/Throat:      Mouth: Mucous membranes are moist.      Pharynx: Oropharynx is clear.   Eyes:      General: Lids are normal.      Extraocular Movements: Extraocular movements intact.      Conjunctiva/sclera: Conjunctivae normal.      Pupils: Pupils are equal, round, and reactive to light.   Neck:      Musculoskeletal: Normal range of motion and neck supple.      Thyroid: No thyroid mass or thyromegaly.      Vascular: No carotid bruit or JVD.      Trachea: Trachea normal.      Comments: No carotid bruits  Cardiovascular:      Rate and Rhythm: Normal rate and regular rhythm.      Heart sounds: Normal heart sounds.   Pulmonary:      Effort: Pulmonary effort is normal.      Breath sounds: Normal breath sounds.   Musculoskeletal:      Comments: No c/c/e   Skin:     General: Skin is warm and dry.   Neurological:      Mental Status: He is alert and oriented to person, place, and time.      Cranial Nerves: No cranial nerve deficit.   Psychiatric:         Attention and Perception: He is attentive.         Speech: Speech normal.         Behavior: Behavior normal.          Statin Choice Calculator  Data Reviewed:               Lab Results   Component Value Date    BUN 18 01/11/2021    CREATININE 0.90 01/11/2021    EGFRIFNONA 80 01/11/2021     01/11/2021    K 4.3 01/11/2021     01/11/2021    CALCIUM  9.5 01/11/2021    ALBUMIN 4.80 01/11/2021    BILITOT 0.4 01/11/2021    ALKPHOS 99 01/11/2021    AST 17 01/11/2021    ALT 16 01/11/2021    TRIG 176 (H) 01/11/2021    HDL 42 01/11/2021    VLDL 30 01/11/2021    LDL 95 01/11/2021    LDLHDL 2.14 01/11/2021    MICROALBUR <1.2 11/09/2020    TSH 6.860 (H) 01/11/2021    FREET4 0.91 (L) 01/11/2021      Assessment/Plan   Order Review Tab  Health Maintenance Tab  Patient Plan/Order Tab  Diagnoses and all orders for this visit:    1. Essential hypertension (Primary)  Assessment & Plan:  Hypertension is improving with treatment.  Continue current treatment regimen.  Regular aerobic exercise.  Continue current medications.  Blood pressure will be reassessed in 3 months.  Check blood pressure at home, bring in readings      2. Type 2 diabetes mellitus without complication, without long-term current use of insulin (CMS/[facility])  Assessment & Plan:  Diabetes is worsening.   Reminded to bring in blood sugar diary at next visit.  Dietary recommendations for ADA diet.  Regular aerobic exercise.  Discussed foot care.  Diabetes will be reassessed in 3 months.  His hemoglobin A1c is elevated higher than usual.  I think it is because of the recent holidays.  I am not going to put him on any medication at this time but will recheck at his next visit if still elevating will consider low-dose Metformin    Orders:  -     Lipid Panel  -     Hemoglobin A1c  -     Comprehensive Metabolic Panel    3. Elevated TSH    4. Hypothyroidism, unspecified type  Assessment & Plan:  Repeat TFTs are mildly low.  We will go ahead and start him on levothyroxine 25 mcg daily.  Recheck TFTs at next visit    Orders:  -     T4, Free  -     T3  -     TSH    5. Recurrent major depressive disorder, in full remission (CMS/[facility])  Assessment & Plan:  Psychological condition is improving with treatment.  Continue current treatment regimen.  Regular aerobic exercise.  Psychological condition  will be reassessed at the next  regular appointment.        Wrapup Tab  Return in about 3 months (around 4/11/2021) for Recheck.

## 2021-01-12 VITALS
TEMPERATURE: 96.6 F | DIASTOLIC BLOOD PRESSURE: 80 MMHG | RESPIRATION RATE: 12 BRPM | HEART RATE: 74 BPM | SYSTOLIC BLOOD PRESSURE: 150 MMHG | BODY MASS INDEX: 26.34 KG/M2 | WEIGHT: 168.2 LBS

## 2021-01-12 PROBLEM — E03.9 HYPOTHYROIDISM: Status: ACTIVE | Noted: 2021-01-12

## 2021-01-13 NOTE — ASSESSMENT & PLAN NOTE
Hypertension is improving with treatment.  Continue current treatment regimen.  Regular aerobic exercise.  Continue current medications.  Blood pressure will be reassessed in 3 months.  Check blood pressure at home, bring in readings

## 2021-01-13 NOTE — ASSESSMENT & PLAN NOTE
Repeat TFTs are mildly low.  We will go ahead and start him on levothyroxine 25 mcg daily.  Recheck TFTs at next visit

## 2021-01-13 NOTE — ASSESSMENT & PLAN NOTE
Diabetes is worsening.   Reminded to bring in blood sugar diary at next visit.  Dietary recommendations for ADA diet.  Regular aerobic exercise.  Discussed foot care.  Diabetes will be reassessed in 3 months.  His hemoglobin A1c is elevated higher than usual.  I think it is because of the recent holidays.  I am not going to put him on any medication at this time but will recheck at his next visit if still elevating will consider low-dose Metformin

## 2021-03-09 ENCOUNTER — APPOINTMENT (OUTPATIENT)
Dept: CT IMAGING | Facility: HOSPITAL | Age: 86
End: 2021-03-09

## 2021-03-09 ENCOUNTER — HOSPITAL ENCOUNTER (OUTPATIENT)
Facility: HOSPITAL | Age: 86
Setting detail: OBSERVATION
Discharge: HOME | End: 2021-03-10
Attending: INTERNAL MEDICINE | Admitting: INTERNAL MEDICINE

## 2021-03-09 ENCOUNTER — TELEPHONE (OUTPATIENT)
Dept: FAMILY MEDICINE CLINIC | Facility: CLINIC | Age: 86
End: 2021-03-09

## 2021-03-09 DIAGNOSIS — R11.2 NAUSEA AND VOMITING, INTRACTABILITY OF VOMITING NOT SPECIFIED, UNSPECIFIED VOMITING TYPE: ICD-10-CM

## 2021-03-09 DIAGNOSIS — R10.9 ABDOMINAL PAIN, UNSPECIFIED ABDOMINAL LOCATION: ICD-10-CM

## 2021-03-09 DIAGNOSIS — K56.609 SMALL BOWEL OBSTRUCTION (HCC): Primary | ICD-10-CM

## 2021-03-09 PROBLEM — E03.9 HYPOTHYROIDISM: Chronic | Status: ACTIVE | Noted: 2021-01-12

## 2021-03-09 PROBLEM — Z00.00 ENCOUNTER FOR MEDICARE ANNUAL WELLNESS EXAM: Status: RESOLVED | Noted: 2020-08-05 | Resolved: 2021-03-09

## 2021-03-09 PROBLEM — N28.1 RENAL CYST, RIGHT: Status: ACTIVE | Noted: 2021-03-09

## 2021-03-09 PROBLEM — K80.20 CHOLELITHIASIS: Status: ACTIVE | Noted: 2021-03-09

## 2021-03-09 PROBLEM — I10 HYPERTENSION: Status: ACTIVE | Noted: 2021-03-09

## 2021-03-09 PROBLEM — R73.9 ACUTE HYPERGLYCEMIA: Status: ACTIVE | Noted: 2021-03-09

## 2021-03-09 LAB
ALBUMIN SERPL-MCNC: 4.6 G/DL (ref 3.5–5.2)
ALBUMIN/GLOB SERPL: 1.8 G/DL
ALP SERPL-CCNC: 112 U/L (ref 39–117)
ALT SERPL W P-5'-P-CCNC: 16 U/L (ref 1–41)
ANION GAP SERPL CALCULATED.3IONS-SCNC: 14 MMOL/L (ref 5–15)
AST SERPL-CCNC: 17 U/L (ref 1–40)
BASOPHILS # BLD AUTO: 0.1 10*3/MM3 (ref 0–0.2)
BASOPHILS NFR BLD AUTO: 0.4 % (ref 0–1.5)
BILIRUB SERPL-MCNC: 0.5 MG/DL (ref 0–1.2)
BILIRUB UR QL STRIP: NEGATIVE
BUN SERPL-MCNC: 18 MG/DL (ref 8–23)
BUN/CREAT SERPL: 17.3 (ref 7–25)
CALCIUM SPEC-SCNC: 10.5 MG/DL (ref 8.6–10.5)
CHLORIDE SERPL-SCNC: 99 MMOL/L (ref 98–107)
CLARITY UR: CLEAR
CO2 SERPL-SCNC: 25 MMOL/L (ref 22–29)
COLOR UR: YELLOW
CREAT SERPL-MCNC: 1.04 MG/DL (ref 0.76–1.27)
DEPRECATED RDW RBC AUTO: 40.7 FL (ref 37–54)
EOSINOPHIL # BLD AUTO: 0 10*3/MM3 (ref 0–0.4)
EOSINOPHIL NFR BLD AUTO: 0.1 % (ref 0.3–6.2)
ERYTHROCYTE [DISTWIDTH] IN BLOOD BY AUTOMATED COUNT: 12.5 % (ref 12.3–15.4)
GFR SERPL CREATININE-BSD FRML MDRD: 68 ML/MIN/1.73
GLOBULIN UR ELPH-MCNC: 2.6 GM/DL
GLUCOSE BLDC GLUCOMTR-MCNC: 141 MG/DL (ref 70–105)
GLUCOSE BLDC GLUCOMTR-MCNC: 165 MG/DL (ref 70–105)
GLUCOSE SERPL-MCNC: 185 MG/DL (ref 65–99)
GLUCOSE UR STRIP-MCNC: ABNORMAL MG/DL
HCT VFR BLD AUTO: 44 % (ref 37.5–51)
HGB BLD-MCNC: 15 G/DL (ref 13–17.7)
HGB UR QL STRIP.AUTO: NEGATIVE
HOLD SPECIMEN: NORMAL
KETONES UR QL STRIP: NEGATIVE
LEUKOCYTE ESTERASE UR QL STRIP.AUTO: NEGATIVE
LIPASE SERPL-CCNC: 14 U/L (ref 13–60)
LYMPHOCYTES # BLD AUTO: 0.5 10*3/MM3 (ref 0.7–3.1)
LYMPHOCYTES NFR BLD AUTO: 4 % (ref 19.6–45.3)
MCH RBC QN AUTO: 31.9 PG (ref 26.6–33)
MCHC RBC AUTO-ENTMCNC: 34.1 G/DL (ref 31.5–35.7)
MCV RBC AUTO: 93.7 FL (ref 79–97)
MONOCYTES # BLD AUTO: 0.9 10*3/MM3 (ref 0.1–0.9)
MONOCYTES NFR BLD AUTO: 7.3 % (ref 5–12)
NEUTROPHILS NFR BLD AUTO: 11.4 10*3/MM3 (ref 1.7–7)
NEUTROPHILS NFR BLD AUTO: 88.2 % (ref 42.7–76)
NITRITE UR QL STRIP: NEGATIVE
NRBC BLD AUTO-RTO: 0 /100 WBC (ref 0–0.2)
PH UR STRIP.AUTO: 7 [PH] (ref 5–8)
PLATELET # BLD AUTO: 202 10*3/MM3 (ref 140–450)
PMV BLD AUTO: 8.6 FL (ref 6–12)
POTASSIUM SERPL-SCNC: 4 MMOL/L (ref 3.5–5.2)
PROCALCITONIN SERPL-MCNC: 0.05 NG/ML (ref 0–0.25)
PROT SERPL-MCNC: 7.2 G/DL (ref 6–8.5)
PROT UR QL STRIP: ABNORMAL
RBC # BLD AUTO: 4.69 10*6/MM3 (ref 4.14–5.8)
SARS-COV-2 RNA PNL SPEC NAA+PROBE: NORMAL
SODIUM SERPL-SCNC: 138 MMOL/L (ref 136–145)
SP GR UR STRIP: 1.02 (ref 1–1.03)
UROBILINOGEN UR QL STRIP: ABNORMAL
WBC # BLD AUTO: 13 10*3/MM3 (ref 3.4–10.8)
WHOLE BLOOD HOLD SPECIMEN: NORMAL

## 2021-03-09 PROCEDURE — G0378 HOSPITAL OBSERVATION PER HR: HCPCS

## 2021-03-09 PROCEDURE — 87635 SARS-COV-2 COVID-19 AMP PRB: CPT | Performed by: PHYSICIAN ASSISTANT

## 2021-03-09 PROCEDURE — 25010000002 PROCHLORPERAZINE 10 MG/2ML SOLUTION: Performed by: NURSE PRACTITIONER

## 2021-03-09 PROCEDURE — 99204 OFFICE O/P NEW MOD 45 MIN: CPT | Performed by: SURGERY

## 2021-03-09 PROCEDURE — 81003 URINALYSIS AUTO W/O SCOPE: CPT | Performed by: PHYSICIAN ASSISTANT

## 2021-03-09 PROCEDURE — 82962 GLUCOSE BLOOD TEST: CPT

## 2021-03-09 PROCEDURE — 80053 COMPREHEN METABOLIC PANEL: CPT | Performed by: PHYSICIAN ASSISTANT

## 2021-03-09 PROCEDURE — 99220 PR INITIAL OBSERVATION CARE/DAY 70 MINUTES: CPT | Performed by: INTERNAL MEDICINE

## 2021-03-09 PROCEDURE — 25010000002 METOCLOPRAMIDE PER 10 MG: Performed by: PHYSICIAN ASSISTANT

## 2021-03-09 PROCEDURE — 96374 THER/PROPH/DIAG INJ IV PUSH: CPT

## 2021-03-09 PROCEDURE — 74176 CT ABD & PELVIS W/O CONTRAST: CPT

## 2021-03-09 PROCEDURE — 84145 PROCALCITONIN (PCT): CPT | Performed by: NURSE PRACTITIONER

## 2021-03-09 PROCEDURE — C9803 HOPD COVID-19 SPEC COLLECT: HCPCS

## 2021-03-09 PROCEDURE — 99285 EMERGENCY DEPT VISIT HI MDM: CPT

## 2021-03-09 PROCEDURE — 85025 COMPLETE CBC W/AUTO DIFF WBC: CPT | Performed by: PHYSICIAN ASSISTANT

## 2021-03-09 PROCEDURE — 25010000002 MORPHINE PER 10 MG: Performed by: NURSE PRACTITIONER

## 2021-03-09 PROCEDURE — 96376 TX/PRO/DX INJ SAME DRUG ADON: CPT

## 2021-03-09 PROCEDURE — 83690 ASSAY OF LIPASE: CPT | Performed by: PHYSICIAN ASSISTANT

## 2021-03-09 PROCEDURE — 96361 HYDRATE IV INFUSION ADD-ON: CPT

## 2021-03-09 PROCEDURE — 96375 TX/PRO/DX INJ NEW DRUG ADDON: CPT

## 2021-03-09 RX ORDER — CHOLECALCIFEROL (VITAMIN D3) 125 MCG
5 CAPSULE ORAL NIGHTLY PRN
Status: DISCONTINUED | OUTPATIENT
Start: 2021-03-09 | End: 2021-03-10 | Stop reason: HOSPADM

## 2021-03-09 RX ORDER — NICOTINE POLACRILEX 4 MG
15 LOZENGE BUCCAL
Status: DISCONTINUED | OUTPATIENT
Start: 2021-03-09 | End: 2021-03-10 | Stop reason: HOSPADM

## 2021-03-09 RX ORDER — SODIUM CHLORIDE 0.9 % (FLUSH) 0.9 %
10 SYRINGE (ML) INJECTION AS NEEDED
Status: DISCONTINUED | OUTPATIENT
Start: 2021-03-09 | End: 2021-03-10 | Stop reason: HOSPADM

## 2021-03-09 RX ORDER — LABETALOL HYDROCHLORIDE 5 MG/ML
10 INJECTION, SOLUTION INTRAVENOUS EVERY 4 HOURS PRN
Status: DISCONTINUED | OUTPATIENT
Start: 2021-03-09 | End: 2021-03-10 | Stop reason: HOSPADM

## 2021-03-09 RX ORDER — METOCLOPRAMIDE HYDROCHLORIDE 5 MG/ML
10 INJECTION INTRAMUSCULAR; INTRAVENOUS ONCE
Status: COMPLETED | OUTPATIENT
Start: 2021-03-09 | End: 2021-03-09

## 2021-03-09 RX ORDER — KETOROLAC TROMETHAMINE 15 MG/ML
15 INJECTION, SOLUTION INTRAMUSCULAR; INTRAVENOUS EVERY 6 HOURS PRN
Status: DISCONTINUED | OUTPATIENT
Start: 2021-03-09 | End: 2021-03-10 | Stop reason: HOSPADM

## 2021-03-09 RX ORDER — PROCHLORPERAZINE EDISYLATE 5 MG/ML
5 INJECTION INTRAMUSCULAR; INTRAVENOUS ONCE
Status: COMPLETED | OUTPATIENT
Start: 2021-03-09 | End: 2021-03-09

## 2021-03-09 RX ORDER — SODIUM CHLORIDE, SODIUM LACTATE, POTASSIUM CHLORIDE, CALCIUM CHLORIDE 600; 310; 30; 20 MG/100ML; MG/100ML; MG/100ML; MG/100ML
75 INJECTION, SOLUTION INTRAVENOUS CONTINUOUS
Status: DISCONTINUED | OUTPATIENT
Start: 2021-03-09 | End: 2021-03-10

## 2021-03-09 RX ORDER — ONDANSETRON 2 MG/ML
4 INJECTION INTRAMUSCULAR; INTRAVENOUS EVERY 6 HOURS PRN
Status: DISCONTINUED | OUTPATIENT
Start: 2021-03-09 | End: 2021-03-10 | Stop reason: HOSPADM

## 2021-03-09 RX ORDER — ACETAMINOPHEN 650 MG/1
650 SUPPOSITORY RECTAL EVERY 4 HOURS PRN
Status: DISCONTINUED | OUTPATIENT
Start: 2021-03-09 | End: 2021-03-10 | Stop reason: HOSPADM

## 2021-03-09 RX ORDER — INSULIN LISPRO 100 [IU]/ML
0-7 INJECTION, SOLUTION INTRAVENOUS; SUBCUTANEOUS AS NEEDED
Status: DISCONTINUED | OUTPATIENT
Start: 2021-03-09 | End: 2021-03-10 | Stop reason: HOSPADM

## 2021-03-09 RX ORDER — MAGNESIUM SULFATE HEPTAHYDRATE 40 MG/ML
2 INJECTION, SOLUTION INTRAVENOUS AS NEEDED
Status: DISCONTINUED | OUTPATIENT
Start: 2021-03-09 | End: 2021-03-10 | Stop reason: HOSPADM

## 2021-03-09 RX ORDER — SODIUM CHLORIDE 0.9 % (FLUSH) 0.9 %
10 SYRINGE (ML) INJECTION EVERY 12 HOURS SCHEDULED
Status: DISCONTINUED | OUTPATIENT
Start: 2021-03-09 | End: 2021-03-10 | Stop reason: HOSPADM

## 2021-03-09 RX ORDER — POTASSIUM CHLORIDE 20 MEQ/1
40 TABLET, EXTENDED RELEASE ORAL AS NEEDED
Status: DISCONTINUED | OUTPATIENT
Start: 2021-03-09 | End: 2021-03-10 | Stop reason: HOSPADM

## 2021-03-09 RX ORDER — ALUMINA, MAGNESIA, AND SIMETHICONE 2400; 2400; 240 MG/30ML; MG/30ML; MG/30ML
15 SUSPENSION ORAL EVERY 6 HOURS PRN
Status: DISCONTINUED | OUTPATIENT
Start: 2021-03-09 | End: 2021-03-10 | Stop reason: HOSPADM

## 2021-03-09 RX ORDER — MORPHINE SULFATE 4 MG/ML
4 INJECTION, SOLUTION INTRAMUSCULAR; INTRAVENOUS ONCE
Status: COMPLETED | OUTPATIENT
Start: 2021-03-09 | End: 2021-03-09

## 2021-03-09 RX ORDER — INSULIN LISPRO 100 [IU]/ML
0-7 INJECTION, SOLUTION INTRAVENOUS; SUBCUTANEOUS EVERY 6 HOURS SCHEDULED
Status: DISCONTINUED | OUTPATIENT
Start: 2021-03-09 | End: 2021-03-10 | Stop reason: HOSPADM

## 2021-03-09 RX ORDER — MAGNESIUM SULFATE 1 G/100ML
1 INJECTION INTRAVENOUS AS NEEDED
Status: DISCONTINUED | OUTPATIENT
Start: 2021-03-09 | End: 2021-03-10 | Stop reason: HOSPADM

## 2021-03-09 RX ORDER — ACETAMINOPHEN 325 MG/1
650 TABLET ORAL EVERY 4 HOURS PRN
Status: DISCONTINUED | OUTPATIENT
Start: 2021-03-09 | End: 2021-03-10 | Stop reason: HOSPADM

## 2021-03-09 RX ORDER — BISACODYL 10 MG
10 SUPPOSITORY, RECTAL RECTAL DAILY PRN
Status: DISCONTINUED | OUTPATIENT
Start: 2021-03-09 | End: 2021-03-10 | Stop reason: HOSPADM

## 2021-03-09 RX ORDER — ACETAMINOPHEN 160 MG/5ML
650 SOLUTION ORAL EVERY 4 HOURS PRN
Status: DISCONTINUED | OUTPATIENT
Start: 2021-03-09 | End: 2021-03-10 | Stop reason: HOSPADM

## 2021-03-09 RX ORDER — FAMOTIDINE 10 MG/ML
20 INJECTION, SOLUTION INTRAVENOUS EVERY 12 HOURS SCHEDULED
Status: DISCONTINUED | OUTPATIENT
Start: 2021-03-09 | End: 2021-03-10 | Stop reason: HOSPADM

## 2021-03-09 RX ORDER — DEXTROSE MONOHYDRATE 25 G/50ML
25 INJECTION, SOLUTION INTRAVENOUS
Status: DISCONTINUED | OUTPATIENT
Start: 2021-03-09 | End: 2021-03-10 | Stop reason: HOSPADM

## 2021-03-09 RX ORDER — ONDANSETRON 4 MG/1
4 TABLET, FILM COATED ORAL EVERY 6 HOURS PRN
Status: DISCONTINUED | OUTPATIENT
Start: 2021-03-09 | End: 2021-03-10 | Stop reason: HOSPADM

## 2021-03-09 RX ADMIN — SODIUM CHLORIDE 1000 ML: 9 INJECTION, SOLUTION INTRAVENOUS at 07:44

## 2021-03-09 RX ADMIN — Medication 10 ML: at 20:50

## 2021-03-09 RX ADMIN — PROCHLORPERAZINE EDISYLATE 5 MG: 5 INJECTION INTRAMUSCULAR; INTRAVENOUS at 11:10

## 2021-03-09 RX ADMIN — FAMOTIDINE 20 MG: 10 INJECTION INTRAVENOUS at 20:51

## 2021-03-09 RX ADMIN — SODIUM CHLORIDE, POTASSIUM CHLORIDE, SODIUM LACTATE AND CALCIUM CHLORIDE 75 ML/HR: 600; 310; 30; 20 INJECTION, SOLUTION INTRAVENOUS at 20:56

## 2021-03-09 RX ADMIN — FAMOTIDINE 20 MG: 10 INJECTION INTRAVENOUS at 11:10

## 2021-03-09 RX ADMIN — MORPHINE SULFATE 4 MG: 4 INJECTION INTRAVENOUS at 11:10

## 2021-03-09 RX ADMIN — SODIUM CHLORIDE, POTASSIUM CHLORIDE, SODIUM LACTATE AND CALCIUM CHLORIDE 75 ML/HR: 600; 310; 30; 20 INJECTION, SOLUTION INTRAVENOUS at 11:11

## 2021-03-09 RX ADMIN — METOCLOPRAMIDE HYDROCHLORIDE 10 MG: 5 INJECTION INTRAMUSCULAR; INTRAVENOUS at 07:45

## 2021-03-09 NOTE — TELEPHONE ENCOUNTER
I called the daughter and let her know that we do not have control what that hospital does. She will need to discuss with the surgeon and if she does not feel like that benefit the situation then she would need to speak with the .

## 2021-03-09 NOTE — PLAN OF CARE
Goal Outcome Evaluation: Pt admitted to room from ED. Pt denies nausea and pain at this time. Will continue to monitor.

## 2021-03-09 NOTE — ED PROVIDER NOTES
"Subjective   Patient is an 87-year-old male who presents from home via EMS with complaints of nausea and vomiting since around 5 PM.  Patient states this is somewhat normal for him he states this happens few times a year usually takes Zofran with some improvement but has taken 16 mg total since 5 AM this morning with no relief.  Denies any hematemesis.  He does report some left lower quadrant abdominal pain described as A constant achy type pain that he rates a 4/10 in severity.  Denies any alleviating or exacerbating factors of his pain.  He denies any urinary symptoms including dysuria or hematuria.  He denies any hematochezia or melena.  Patient does report he has a colostomy secondary to resection due to rectal cancer is not currently getting any chemo or radiation.  He does feel like he has had decrease in his bowel movement since yesterday.  Patient states he normally feels about 3-4 ostomy bags and has barely filled one.  he denies any fever body aches or chills chest pain or shortness of breath recent travel or known sick contacts.          Review of Systems   Constitutional: Negative.    HENT: Negative.    Eyes: Negative for photophobia and visual disturbance.   Respiratory: Negative.    Cardiovascular: Negative.    Gastrointestinal: Positive for abdominal pain, constipation, nausea and vomiting. Negative for abdominal distention, blood in stool and diarrhea.   Genitourinary: Negative.    Musculoskeletal: Negative for back pain, neck pain and neck stiffness.   Skin: Negative.    Neurological: Negative.        Past Medical History:   Diagnosis Date   • Anxiety    • Benign bladder tumor    • Benign prostatic hyperplasia    • Dermatochalasis of both upper eyelids    • Diabetes mellitus (CMS/HCC)    • GERD (gastroesophageal reflux disease)    • History of rectal cancer 2004    HX RESECTION, COLOSTOMY, AND RADIATION   • History of seizure     ?? PT STATESHE HAS \"EPISODES\", LOSS OF CONSCIOUSNESS AT TIMES - ON " "MEIDCATION, SEES NEURO IN [place], NO \"EPISODES\" SINCE LAT 2018   • History of subdural hematoma    • Hyperlipidemia    • Hypertension    • Hypothyroidism    • Lentigo maligna (CMS/HCC)    • ALAN (obstructive sleep apnea)    • Prediabetes    • Senile ectropion of both lower eyelids    • Sleep apnea     DOES NOT WEAR CPAP   • Vasovagal syncope    • Venous stasis dermatitis        No Known Allergies    Past Surgical History:   Procedure Laterality Date   • BLEPHAROPLASTY Left 6/6/2019    Procedure: LEFT UPPER LID BLEPHAROPLASTY;  Surgeon: Luis Edwards MD;  Location: [facility] OR [facility] [place];  Service: Ophthalmology   • RO HOLE FOR SUBDURAL HEMATOMA  2016   • CATARACT EXTRACTION, BILATERAL     • COLON RESECTION WITH COLOSTOMY  2004    D/T RECTAL CANCER    • COLONOSCOPY     • ECTROPION REPAIR Bilateral 6/6/2019    Procedure: BILATERAL LOWER LID ECTROPION REPAIR WITH MEDIAL SPENDEL;  Surgeon: Luis Edwards MD;  Location: [facility] OR [facility] [place];  Service: Ophthalmology   • ENDOSCOPY     • HEMORRHOIDECTOMY     • HERNIA REPAIR     • PROSTATE BIOPSY         Family History   Problem Relation Age of Onset   • Heart failure Mother    • Heart attack Father    • Dementia Sister    • Malig Hyperthermia Neg Hx        Social History     Socioeconomic History   • Marital status:      Spouse name: Not on file   • Number of children: Not on file   • Years of education: Not on file   • Highest education level: Not on file   Tobacco Use   • Smoking status: Never Smoker   • Smokeless tobacco: Never Used   Substance and Sexual Activity   • Alcohol use: No   • Drug use: No   • Sexual activity: Defer           Objective   Physical Exam  Vitals and nursing note reviewed.   Constitutional:       General: He is not in acute distress.     Appearance: He is well-developed. He is not ill-appearing, toxic-appearing or diaphoretic.   HENT:      Head: Normocephalic and atraumatic.      Mouth/Throat:      Mouth: Mucous membranes are moist.      " "Pharynx: Oropharynx is clear.   Eyes:      General: No scleral icterus.     Extraocular Movements: Extraocular movements intact.      Pupils: Pupils are equal, round, and reactive to light.   Cardiovascular:      Rate and Rhythm: Normal rate and regular rhythm.      Heart sounds: No murmur. No friction rub. No gallop.    Pulmonary:      Effort: Pulmonary effort is normal. No tachypnea or accessory muscle usage.      Breath sounds: Normal breath sounds. No decreased breath sounds, wheezing, rhonchi or rales.   Chest:      Chest wall: No mass, deformity, tenderness or crepitus.   Abdominal:      General: Bowel sounds are decreased. There is distension.      Palpations: Abdomen is rigid. There is no hepatomegaly, splenomegaly or mass.      Tenderness: There is generalized abdominal tenderness and tenderness in the left lower quadrant. There is no right CVA tenderness, left CVA tenderness, guarding or rebound.          Comments: Does have some generalized tenderness to palpation more noted in left lower quadrant.   Musculoskeletal:      Cervical back: Normal range of motion and neck supple.   Skin:     General: Skin is warm.      Capillary Refill: Capillary refill takes less than 2 seconds.      Findings: No rash.   Neurological:      Mental Status: He is alert and oriented to person, place, and time.   Psychiatric:         Mood and Affect: Mood normal.         Behavior: Behavior normal.         Procedures           ED Course    /77 (BP Location: Left arm, Patient Position: Lying)   Pulse 85   Temp 97.9 °F (36.6 °C) (Oral)   Resp 16   Ht 167.6 cm (66\")   Wt 74.8 kg (165 lb)   SpO2 100%   BMI 26.63 kg/m²   Medications   sodium chloride 0.9 % flush 10 mL (has no administration in time range)   sodium chloride 0.9 % bolus 1,000 mL (0 mL Intravenous Stopped 3/9/21 0921)   metoclopramide (REGLAN) injection 10 mg (10 mg Intravenous Given 3/9/21 5660)     Labs Reviewed   COMPREHENSIVE METABOLIC PANEL - Abnormal; " Notable for the following components:       Result Value    Glucose 185 (*)     All other components within normal limits    Narrative:     GFR Normal >60  Chronic Kidney Disease <60  Kidney Failure <15     URINALYSIS W/ CULTURE IF INDICATED - Abnormal; Notable for the following components:    Glucose,  mg/dL (Trace) (*)     Protein, UA Trace (*)     All other components within normal limits    Narrative:     Urine microscopic not indicated.   CBC WITH AUTO DIFFERENTIAL - Abnormal; Notable for the following components:    WBC 13.00 (*)     Neutrophil % 88.2 (*)     Lymphocyte % 4.0 (*)     Eosinophil % 0.1 (*)     Neutrophils, Absolute 11.40 (*)     Lymphocytes, Absolute 0.50 (*)     All other components within normal limits   COVID-19,ABBOTT IN-HOUSE,NASAL SWAB (NO TRANSPORT MEDIA) 2 HR TAT - Normal    Narrative:     Fact sheet for providers: https://www.fda.gov/media/602201/download     Fact sheet for patients: https://www.fda.gov/media/590644/download    Test performed by PCR.  If inconsistent with clinical signs and symptoms patient should be tested with different authorized molecular test.   LIPASE - Normal   CBC AND DIFFERENTIAL    Narrative:     The following orders were created for panel order CBC & Differential.  Procedure                               Abnormality         Status                     ---------                               -----------         ------                     CBC Auto Differential[[id number]]        Abnormal            Final result                 Please view results for these tests on the individual orders.   EXTRA TUBES    Narrative:     The following orders were created for panel order Extra Tubes.  Procedure                               Abnormality         Status                     ---------                               -----------         ------                     Light Blue Top[[id number]]                                   Final result               Gold Top -  [facility][[id number]]                                   Final result                 Please view results for these tests on the individual orders.   LIGHT BLUE TOP   GOLD TOP - SST     CT Abdomen Pelvis Without Contrast    Result Date: 3/9/2021   1. FINDINGS consistent with the appearance of high-grade small bowel obstruction. Transition zone is demonstrated within the presacral pelvis, presumably due to adhesion. 2. Distal colectomy, prostatectomy, left lower quadrant colostomy. 3. Fluid distention of the lower thoracic esophagus, and significant air-fluid distention of the stomach. This places the patient at increased risk for aspiration. Consider NG tube decompression. 4. Additional findings include: Uncomplicated cholelithiasis, right renal cyst.  Electronically Signed By-Teena Molina MD On:3/9/2021 8:53 AM This report was finalized on [id number] by  Teena Molina MD.                                           MDM  Number of Diagnoses or Management Options  Abdominal pain, unspecified abdominal location  Nausea and vomiting, intractability of vomiting not specified, unspecified vomiting type  Small bowel obstruction (CMS/HCC)  Diagnosis management comments: Chart Review:  Comorbidity: As per past medical history  Differentials: DKA, intra-abdominal infection, dissection, peritonitis, peptic ulcer disease, pancreatitis, hepatitis, ischemic bowel, bowel obstruction, appendicitis     ;this list is not all inclusive and does not constitute the entirety of considered causes  ECG: Not warranted  Labs: COVID-19 swab negative lipase 14.  CBC shows WBC 13 hemoglobin 15 hematocrit 44 platelets 202 CMP shows glucose 185 BUN 18 creatinine 1.04 sodium 130 potassium 4  Imaging: Was interpreted by physician and reviewed by myself:  CT Abdomen Pelvis Without Contrast  Result Date: 3/9/2021   1. FINDINGS consistent with the appearance of high-grade small bowel obstruction. Transition zone is demonstrated within the presacral  pelvis, presumably due to adhesion. 2. Distal colectomy, prostatectomy, left lower quadrant colostomy. 3. Fluid distention of the lower thoracic esophagus, and significant air-fluid distention of the stomach. This places the patient at increased risk for aspiration. Consider NG tube decompression. 4. Additional findings include: Uncomplicated cholelithiasis, right renal cyst.  Electronically Signed By-Teena Molina MD On:3/9/2021 8:53 AM This report was finalized on [id number] by  Teena Molina MD.    Disposition/Treatment:  Appropriate PPE was worn during exam and throughout all encounters with the patient.  The ED IV was placed and labs are obtained patient was afebrile and appeared nontoxic he took 16 mg of Zofran at around 5 AM this morning.  He was given Reglan and fluids while in the ED. does report improvement of his nausea.  COVID-19 swab negative.  CBC shows WBC 13.  CT of abdomen and pelvis shows a high-grade small bowel obstruction old chart reviewed patient has had partial obstructions and dilated small bowel loops in the past.  We discussed placement of NG tube however patient refused.  Results and findings were discussed with the patient and family at bedside discussed the importance of admission for observation and surgical consultation patient was initially reluctant to stay after further bedside discussion he was in agreement with plan.  Spoke to Yoselyn MORELAND for hospitalist group agreed for admission.  Also consulted surgery spoke to Dr Bowens who was in agreement for consultation.  Once again discussed possible NG placement however at this time patient still refuses.       Amount and/or Complexity of Data Reviewed  Clinical lab tests: reviewed  Tests in the radiology section of CPT®: reviewed        Final diagnoses:   Small bowel obstruction (CMS/HCC)   Nausea and vomiting, intractability of vomiting not specified, unspecified vomiting type   Abdominal pain, unspecified abdominal location             Chi Pantoja PA  03/09/21 1015

## 2021-03-09 NOTE — ED NOTES
"Pt brought in from EMS from home. Pt stated he went last night with nausea. Pt stated he took 8mg of zofran. Pt stated \"after about an hour I took another 8mg.\" Pt stated he has these vomiting episodes \"a couple times a year but it's been over a year since i've had one.\"      Luis Horn, RN  03/09/21 0704    "

## 2021-03-09 NOTE — ED NOTES
Patient reports vomiting started around 0500 today.  Complains of abdominal pain.  Reports he has this issue with vomiting before.  Patient does have colostomy bag.  Reports he only had one stool in it yesterday and normally 3 daily.       Merle Faustin RN  03/09/21 0129

## 2021-03-09 NOTE — H&P
[facility] Medicine Services      Patient Name: Mark Busch Jr.  : 1933  MRN: [id number]  Primary Care Physician: Jack Huynh MD  Date of admission: 3/9/2021    Patient Care Team:  Jack Huynh MD as PCP - General  Jack Huynh MD as PCP - Family Medicine  Maggie Swain APRN (Nurse Practitioner)          Subjective   History Present Illness     Chief Complaint:   Chief Complaint   Patient presents with   • Vomiting         Mr. Busch is a 87 y.o. male with a history of rectal cancer status post colon resection with colostomy placement, hypothyroidism, hyperlipidemia, anxiety, depression, GERD, seizures, and pre-diabetes who presents to [facility] ED on 2021 complaining of nausea and vomiting. The patient states his symptoms started around 5 pm last night. He reports projectile vomiting. He denies hematemesis. He reports periumbilical abdominal pain. He denies fever or chills. He denies any exacerbating or alleviating factors.     Upon arrival to the ER the patient was given 1 liter normal saline and Reglan 10 mg IV. His CBC, CMP, and lipase were unremarkable except WBC 13.00 and glucose 185. The patient was hypertensive with BP 180s/100s. He denies a history of hypertension, but takes a beta blocker at home. His CT abd/pelvis without contrast showed a high-grade small bowel obstruction. He reportedly refused a nasogastric tube for decompression. General surgery was consulted. He was admitted for further treatment.          Review of Systems   Constitutional: Negative for chills and fever.   Gastrointestinal: Positive for abdominal pain, nausea and vomiting. Negative for constipation and diarrhea.   All other systems reviewed and are negative.        Personal History     Past Medical History:   Past Medical History:   Diagnosis Date   • Anxiety    • Benign bladder tumor    • Benign prostatic hyperplasia    • Colostomy in place (CMS/[facility])    •  "Dermatochalasis of both upper eyelids    • GERD (gastroesophageal reflux disease)    • History of seizure     ?? PT STATESHE HAS \"EPISODES\", LOSS OF CONSCIOUSNESS AT TIMES - ON MEIDCATION, SEES NEURO IN [place], NO \"EPISODES\" SINCE LAT 2018   • History of subdural hematoma    • Hyperlipidemia    • Hypothyroidism    • Lentigo maligna (CMS/HCC)    • Overweight (BMI 25.0-29.9)    • Prediabetes    • Rectal cancer (CMS/HCC)     h/o resection, colostomy, and radiation   • Senile ectropion of both lower eyelids    • Sleep apnea     DOES NOT WEAR CPAP   • Vasovagal syncope    • Venous stasis dermatitis        Surgical History:      Past Surgical History:   Procedure Laterality Date   • BLEPHAROPLASTY Left 6/6/2019    Procedure: LEFT UPPER LID BLEPHAROPLASTY;  Surgeon: Luis Edwards MD;  Location: [facility] [place] [facility] [place]  Service: Ophthalmology   • RO HOLE FOR SUBDURAL HEMATOMA  2016   • CATARACT EXTRACTION, BILATERAL     • COLON RESECTION WITH COLOSTOMY  2004    D/T RECTAL CANCER    • COLONOSCOPY     • ECTROPION REPAIR Bilateral 6/6/2019    Procedure: BILATERAL LOWER LID ECTROPION REPAIR WITH MEDIAL SPENDEL;  Surgeon: Luis Edwards MD;  Location: [facility] OR [facility] [place];  Service: Ophthalmology   • ENDOSCOPY     • HEMORRHOIDECTOMY     • HERNIA REPAIR     • PROSTATE BIOPSY         Family History: family history includes Dementia in his sister; Heart attack in his father; Heart failure in his mother. Otherwise pertinent FHx was reviewed and unremarkable.     Social History:  reports that he has never smoked. He has never used smokeless tobacco. He reports that he does not drink alcohol and does not use drugs.      Medications:  Prior to Admission medications    Medication Sig Start Date End Date Taking? Authorizing Provider   atorvastatin (LIPITOR) 20 MG tablet TAKE 1 TABLET BY MOUTH  EVERY NIGHT 12/10/20  Yes Jack Huynh MD   coenzyme Q10 100 MG capsule Take 100 mg by mouth Daily. HELD FOR OR   Yes Provider, " MD Kiet   diphenhydrAMINE (BENADRYL) 25 mg capsule Take 25 mg by mouth At Night As Needed for Itching.   Yes ProviderKiet MD   levothyroxine (SYNTHROID, LEVOTHROID) 25 MCG tablet Take 1 tablet by mouth Daily. 1/11/21  Yes Jack Huynh MD   ondansetron (ZOFRAN) 8 MG tablet Take 8 mg by mouth Every 8 (Eight) Hours As Needed for Nausea or Vomiting.   Yes Kiet Pike MD   OXcarbazepine (TRILEPTAL) 300 MG tablet Take 1 tablet by mouth twice daily 12/23/20  Yes Jack Huynh MD   pantoprazole (PROTONIX) 40 MG EC tablet TAKE 1 TABLET BY MOUTH  DAILY 12/30/20  Yes Jack Huynh MD   pindolol (VISKEN) 5 MG tablet Take 1 tablet by mouth 2 (Two) Times a Day. 12/17/20  Yes Jack Huynh MD   sertraline (ZOLOFT) 50 MG tablet One and a half daily 3/20/20  Yes Jack Huynh MD   traZODone (DESYREL) 50 MG tablet Take 1 tablet by mouth every night at bedtime. 5/22/20  Yes Page Loyd MD   Triamcinolone Acetonide 0.025 % lotion APPLY TO LOWER LEGS 1 TO 3 TIMES A WEEK 8/25/20 3/9/21  ProviderKiet MD       Allergies:  No Known Allergies      Objective   Objective     Vital Signs  Temp:  [97.9 °F (36.6 °C)] 97.9 °F (36.6 °C)  Heart Rate:  [65-85] 85  Resp:  [16-19] 16  BP: (157-158)/(72-77) 158/77  SpO2:  [100 %] 100 %  on   ;   Device (Oxygen Therapy): room air  Body mass index is 26.63 kg/m².    Physical Exam  Vitals reviewed.   Constitutional:       Appearance: He is overweight.   HENT:      Head: Normocephalic.      Nose: Nose normal.      Mouth/Throat:      Mouth: Mucous membranes are moist.   Eyes:      Conjunctiva/sclera: Conjunctivae normal.      Pupils: Pupils are equal, round, and reactive to light.   Cardiovascular:      Rate and Rhythm: Normal rate and regular rhythm.      Pulses: Normal pulses.      Heart sounds: Normal heart sounds.   Pulmonary:      Effort: Pulmonary effort is normal.      Breath sounds: Normal breath sounds.   Abdominal:      General:  Bowel sounds are decreased. There is distension.      Tenderness: There is abdominal tenderness in the periumbilical area.      Comments: LLQ colostomy in place   Musculoskeletal:         General: Normal range of motion.      Cervical back: Normal range of motion and neck supple.      Right lower leg: Edema present.      Left lower leg: Edema present.      Comments: Trace BLE edema   Skin:     General: Skin is warm and dry.      Capillary Refill: Capillary refill takes less than 2 seconds.   Neurological:      General: No focal deficit present.      Mental Status: He is alert and oriented to person, place, and time.   Psychiatric:         Mood and Affect: Mood normal.         Behavior: Behavior normal.         Results Review:  I have personally reviewed most recent lab results, microbiology results and radiology images and interpretations and agree with findings.    Results from last 7 days   Lab Units 03/09/21  0745   WBC 10*3/mm3 13.00*   HEMOGLOBIN g/dL 15.0   HEMATOCRIT % 44.0   PLATELETS 10*3/mm3 202     Results from last 7 days   Lab Units 03/09/21  0745   SODIUM mmol/L 138   POTASSIUM mmol/L 4.0   CHLORIDE mmol/L 99   CO2 mmol/L 25.0   BUN mg/dL 18   CREATININE mg/dL 1.04   GLUCOSE mg/dL 185*   CALCIUM mg/dL 10.5   ALT (SGPT) U/L 16   AST (SGOT) U/L 17     Estimated Creatinine Clearance: 52.9 mL/min (by C-G formula based on SCr of 1.04 mg/dL).  Brief Urine Lab Results  (Last result in the past 365 days)      Color   Clarity   Blood   Leuk Est   Nitrite   Protein   CREAT   Urine HCG        03/09/21 0945 Yellow Clear Negative Negative Negative Trace               Microbiology Results (last 10 days)     Procedure Component Value - Date/Time    COVID-19, ABBOTT IN-HOUSE,NASAL Swab (NO TRANSPORT MEDIA) 2 HR TAT - Swab, Nasopharynx [[id number]]  (Normal) Collected: 03/09/21 0804    Lab Status: Final result Specimen: Swab from Nasopharynx Updated: 03/09/21 0824     COVID19 Presumptive Negative    Narrative:      Fact  sheet for providers: https://www.fda.gov/media/475561/download     Fact sheet for patients: https://www.fda.gov/media/722184/download    Test performed by PCR.  If inconsistent with clinical signs and symptoms patient should be tested with different authorized molecular test.          ECG/EMG Results (most recent)     None          CT Abdomen Pelvis Without Contrast    Result Date: 3/9/2021   1. FINDINGS consistent with the appearance of high-grade small bowel obstruction. Transition zone is demonstrated within the presacral pelvis, presumably due to adhesion. 2. Distal colectomy, prostatectomy, left lower quadrant colostomy. 3. Fluid distention of the lower thoracic esophagus, and significant air-fluid distention of the stomach. This places the patient at increased risk for aspiration. Consider NG tube decompression. 4. Additional findings include: Uncomplicated cholelithiasis, right renal cyst.  Electronically Signed By-Teena Molina MD On:3/9/2021 8:53 AM This report was finalized on [id number] by  Teena Molina MD.      Estimated Creatinine Clearance: 52.9 mL/min (by C-G formula based on SCr of 1.04 mg/dL).      Assessment/Plan   Assessment/Plan       Active Hospital Problems    Diagnosis  POA   • **Small bowel obstruction (CMS/HCC) [K56.609]  Yes   • Intractable vomiting with nausea [R11.2]  Yes   • Acute hyperglycemia [R73.9]  Yes   • Hypertension [I10]  Yes   • Cholelithiasis [K80.20]  Yes   • Renal cyst, right [N28.1]  Not Applicable   • GERD (gastroesophageal reflux disease) [K21.9]  Yes   • Hyperlipidemia [E78.5]  Yes   • Prediabetes [R73.03]  Yes   • Overweight (BMI 25.0-29.9) [E66.3]  Yes   • Colostomy in place (CMS/HCC) [Z93.3]  Not Applicable   • History of seizure [Z87.898]  Not Applicable   • Hypothyroidism [E03.9]  Yes   • Obstructive sleep apnea [G47.33]  Yes   • Depression [F32.9]  Yes   • History of rectal cancer [Z85.048]  Yes   • Generalized anxiety disorder [F41.1]  Yes      Resolved  Hospital Problems   No resolved problems to display.       Small bowel obstruction  -likely secondary to adhesion from previous colectomy   -NPO  -general surgery consulted  -patient refused NGT    Intractable vomiting with nausea  -PRN antiemetics  -IVF  -IV Pepcid q12h    Acute hyperglycemia with h/o pre-diabetes  -check A1c  -accu checks q6h with SSI    Hypertension  -patient denies h/o HTN, but takes pindolol at home  -IV labetalol q4h PRN SBP>160  -monitor BP    Depression  Generalized anxiety disorder  -hold Zoloft and trazodone while NPO    Obstructive sleep apnea  -patient does not wear CPAP    History of rectal cancer, s/p resection with colostomy    Hypothyroidism  -hold levothyroxine while NPO    GERD (gastroesophageal reflux disease)  -hold oral PPI while NPO  -IV Pepcid as above    Hyperlipidemia  -hold statin while NPO    Overweight (BMI 25.0-29.9)  -BMI 26.63 on admission  -encourage lifestyle modifications     History of seizure  -hold Trileptal while NPO    Cholelithiasis without cholecystitis  -incidental finding on CT scan    Right renal cyst  -incidental finding on CT scan          VTE Prophylaxis -   Mechanical Order History:      Ordered        03/09/21 1014  Place Sequential Compression Device  Once         03/09/21 1014  Maintain Sequential Compression Device  Continuous                 Pharmalogical Order History:     None          CODE STATUS:    Code Status and Medical Interventions:   Ordered at: 03/09/21 1014     Code Status:    CPR     Medical Interventions (Level of Support Prior to Arrest):    Full       This patient has been examined wearing appropriate Personal Protective Equipment. 03/09/21      I discussed the patient's findings and my recommendations with patient and family.      Signature:  Electronically signed by DIDIER Green, 03/09/21, 10:38 AM EST.  [facility] Hospitalist Team        Hospitalist Physician Assessment/Plan    History:  Seen and examined  Patient has  no gastric tube in place now but he feels is not very much nauseous or vomiting anymore since he vomited earlier today  He and his daughter reported they will what seems to be vasovagal syncopal episode without chest pain or injury to the head or neck, he denies any tenderness to the cervical spine on examination  Is atraumatic normocephalic chronic examination of the head  Abdomen mildly distended but nontender  He has systolic murmur at the base not radiating to carotids    Exam:  As above  Medical Decision Making:*  Consider follow-up x-ray of abdomen or small bowel follow-through in the more  If patient has persistent vomiting or distention or retching may need nasogastric tube to decompress his bowels  General surgeon also has been seeing the patient and appreciate his input  We will monitor neurologic status and troponin level    Attending Physician Attestation    For this patient encounter, I have reviewed the mid-level provider documentation, medical decision making and treatment plan, and I have personally spent time with the patient.  All procedures were done by me, and/or all procedures were performed by the mid-level under my supervision.    Electronically signed by Jeff Burden MD, 03/09/21, 3:54 PM EST.

## 2021-03-09 NOTE — CONSULTS
Surgery (on-call MD unless specified)  Consult performed by: Adrian Bowens MD  Consult ordered by: Chi Pantoja PA  Reason for consult: small bowel obstruction          General Surgery Consult Note      Name: Mark Busch Jr. ADMIT: 3/9/2021   : 1933  PCP: Jack Huynh MD    MRN: [id number] LOS: 0 days   AGE/SEX: 87 y.o. male  ROOM: [address] [place] [address]      Patient Care Team:  Jack Huynh MD as PCP - General  Jack Huynh MD as PCP - Family Medicine  Maggie Swain APRN (Nurse Practitioner)  Chief Complaint   Patient presents with   • Vomiting       Subjective   87-year-old man with a abdominal surgical history including colon resection with end colostomy and radiation for rectal cancer about 16 years ago who ever since that time has been dealing with some intermittent abdominal pain nausea and vomiting.  He says he has been diagnosed with adhesive bowel obstructions in the past.  He says he has had maybe 20 flareups where he has crampy abdominal pain nausea and vomiting.  That is similar to what he had this admission.  He says that he has a deep discomfort in his lower abdomen it is then followed by abdominal fullness and projectile vomiting.  He says that he has had similar issues that predated his rectal resection by about 10 years and has been worked up extensively.  He is unsure whether he has had a small bowel follow-through before but he does not think he is ever had a lysis of adhesions for bowel obstructions over the last 15 years.  On arrival to the emergency department his nausea had subsided.  On arrival to the emergency department he had a nondistended nontender abdomen.  White blood cell count to 13.  CT scan obtained concerning for adhesive bowel obstruction in the pelvis likely related to his previous surgery and/or radiation.  He refused nasogastric tube placement and he did not want to be admitted to the hospital but he was admitted and I was asked to see him  "for management of his adhesive bowel obstruction.     His last good colostomy output was about 2 days ago.  He denies any blood per colostomy.  At times when he gets constipated he takes some stool softener which seems to help.  He also says that he eats what he wants to and thinks that could be contributing to some of his issues.    Past Medical History:   Diagnosis Date   • Anxiety    • Benign bladder tumor    • Benign prostatic hyperplasia    • Colostomy in place (CMS/[facility])    • Dermatochalasis of both upper eyelids    • GERD (gastroesophageal reflux disease)    • History of seizure     ?? PT STATESHE HAS \"EPISODES\", LOSS OF CONSCIOUSNESS AT TIMES - ON MEIDCATION, SEES NEURO IN [place], NO \"EPISODES\" SINCE LAT 2018   • History of subdural hematoma    • Hyperlipidemia    • Hypothyroidism    • Lentigo maligna (CMS/[facility])    • Nausea and vomiting 03/09/2021   • Overweight (BMI 25.0-29.9)    • Prediabetes    • Rectal cancer (CMS/[facility])     h/o resection, colostomy, and radiation   • Senile ectropion of both lower eyelids    • Sleep apnea     DOES NOT WEAR CPAP   • Small bowel obstruction (CMS/[facility]) 03/09/2021   • Vasovagal syncope    • Venous stasis dermatitis      Past Surgical History:   Procedure Laterality Date   • BLEPHAROPLASTY Left 6/6/2019    Procedure: LEFT UPPER LID BLEPHAROPLASTY;  Surgeon: Luis Edwards MD;  Location: [facility] [place] [facility] [place]  Service: Ophthalmology   • RO HOLE FOR SUBDURAL HEMATOMA  2016   • CATARACT EXTRACTION, BILATERAL     • COLON RESECTION WITH COLOSTOMY  2004    D/T RECTAL CANCER    • COLONOSCOPY     • ECTROPION REPAIR Bilateral 6/6/2019    Procedure: BILATERAL LOWER LID ECTROPION REPAIR WITH MEDIAL SPENDEL;  Surgeon: Luis Edwards MD;  Location: [facility] [place] [facility] [place];  Service: Ophthalmology   • ENDOSCOPY     • HEMORRHOIDECTOMY     • HERNIA REPAIR     • PROSTATE BIOPSY       Family History   Problem Relation Age of Onset   • Heart failure Mother    • Heart attack Father    • Dementia " Sister    • Maljonny Hyperthermia Neg Hx      Social History     Tobacco Use   • Smoking status: Never Smoker   • Smokeless tobacco: Never Used   Vaping Use   • Vaping Use: Never used   Substance Use Topics   • Alcohol use: No   • Drug use: No     Medications Prior to Admission   Medication Sig Dispense Refill Last Dose   • atorvastatin (LIPITOR) 20 MG tablet TAKE 1 TABLET BY MOUTH  EVERY NIGHT 90 tablet 3 3/8/2021 at Unknown time   • coenzyme Q10 100 MG capsule Take 100 mg by mouth Daily. HELD FOR OR   3/8/2021 at Unknown time   • diphenhydrAMINE (BENADRYL) 25 mg capsule Take 25 mg by mouth At Night As Needed for Itching.      • levothyroxine (SYNTHROID, LEVOTHROID) 25 MCG tablet Take 1 tablet by mouth Daily. 90 tablet 3 3/8/2021 at Unknown time   • ondansetron (ZOFRAN) 8 MG tablet Take 8 mg by mouth Every 8 (Eight) Hours As Needed for Nausea or Vomiting.   3/9/2021 at 0300   • OXcarbazepine (TRILEPTAL) 300 MG tablet Take 1 tablet by mouth twice daily 180 tablet 0 3/8/2021 at Unknown time   • pantoprazole (PROTONIX) 40 MG EC tablet TAKE 1 TABLET BY MOUTH  DAILY 90 tablet 3 3/8/2021 at Unknown time   • pindolol (VISKEN) 5 MG tablet Take 1 tablet by mouth 2 (Two) Times a Day. 60 tablet 6 3/8/2021 at Unknown time   • sertraline (ZOLOFT) 50 MG tablet One and a half daily 135 tablet 4 3/8/2021 at Unknown time   • traZODone (DESYREL) 50 MG tablet Take 1 tablet by mouth every night at bedtime. 90 tablet 3 3/8/2021 at Unknown time     famotidine, 20 mg, Intravenous, Q12H  insulin lispro, 0-7 Units, Subcutaneous, Q6H  sodium chloride, 10 mL, Intravenous, Q12H      lactated ringers, 75 mL/hr, Last Rate: 75 mL/hr (03/09/21 1111)      •  acetaminophen **OR** acetaminophen **OR** acetaminophen  •  aluminum-magnesium hydroxide-simethicone  •  bisacodyl  •  dextrose  •  dextrose  •  glucagon (human recombinant)  •  insulin lispro **AND** insulin lispro  •  ketorolac  •  labetalol  •  magnesium sulfate **OR** magnesium sulfate in D5W  1g/100mL (PREMIX)  •  melatonin  •  ondansetron **OR** ondansetron  •  potassium chloride  •  sodium chloride  •  sodium chloride  Patient has no known allergies.    Review of Systems   Constitutional: Negative for chills and fever.   HENT: Negative for sore throat and trouble swallowing.    Eyes: Negative for blurred vision and double vision.   Respiratory: Negative for cough and shortness of breath.    Cardiovascular: Negative for chest pain and leg swelling.   Gastrointestinal: Positive for abdominal pain, nausea and vomiting. Negative for abdominal distention, blood in stool, constipation and diarrhea.   Genitourinary: Negative for dysuria and hematuria.   Skin: Negative for rash and bruise.   Neurological: Negative for dizziness and confusion.   Psychiatric/Behavioral: Negative for agitation and depressed mood.        Objective     Vital Signs and Labs:  Vital Signs (range)  Temp:  [97.9 °F (36.6 °C)] 97.9 °F (36.6 °C)  Heart Rate:  [65-85] 85  Resp:  [16-19] 16  BP: (103-158)/(55-77) 127/70    Physical Exam:  Physical Exam  Constitutional:       Appearance: Normal appearance. He is well-developed.   HENT:      Head: Normocephalic and atraumatic.   Eyes:      General: No scleral icterus.     Conjunctiva/sclera: Conjunctivae normal.   Neck:      Trachea: No tracheal deviation.   Cardiovascular:      Rate and Rhythm: Normal rate.      Pulses: Normal pulses.   Pulmonary:      Effort: Pulmonary effort is normal. No respiratory distress.   Abdominal:      General: There is no distension.      Palpations: Abdomen is soft.      Comments: Minimal tenderness to palpation left lower quadrant colostomy at this point nonproductive the bag is empty   Musculoskeletal:         General: No swelling or tenderness.      Cervical back: No muscular tenderness.   Lymphadenopathy:      Cervical: No cervical adenopathy.   Skin:     General: Skin is warm and dry.   Neurological:      General: No focal deficit present.      Mental  Status: He is alert. Mental status is at baseline.   Psychiatric:         Mood and Affect: Mood normal.         Behavior: Behavior normal.         CBC    Results from last 7 days   Lab Units 03/09/21  0745   WBC 10*3/mm3 13.00*   HEMOGLOBIN g/dL 15.0   PLATELETS 10*3/mm3 202     BMP   Results from last 7 days   Lab Units 03/09/21  0745   SODIUM mmol/L 138   POTASSIUM mmol/L 4.0   CHLORIDE mmol/L 99   CO2 mmol/L 25.0   BUN mg/dL 18   CREATININE mg/dL 1.04   GLUCOSE mg/dL 185*       I reviewed the patient's new clinical results.  I reviewed the patient's new imaging results and agree with the interpretation.    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 male with a history of rectal cancer status post resection and colostomy for radiation.  Likely bowel obstruction related to adhesions.  At his age he has dictated a lot of his care which is understandable.  I do not see any small bowel studies recently but have offered them a small bowel follow-through as early as tomorrow to provide some diagnostic information as well as could be therapeutic to help alleviate his adhesive bowel obstruction.  They were understandable and willing to proceed with a small bowel follow-through tomorrow.       This note was created using Dragon Voice Recognition software.    Adrian Bowens MD  03/09/21  15:21 EST

## 2021-03-09 NOTE — TELEPHONE ENCOUNTER
NATE, DAUGHTER CALLED VERY CONCERNED THAT HER FATHER WAS ADMITTED TO [facility] FOR VIOLENT VOMITING AND HYPOTENSIVE.  ALSO HAS A CHOLOSCOPY DUE TO COLON CANCER.    NATE HAD DISCUSSED THAT WHEN SHE SPOKE WITH DR PITT WITH IN THE LAST 2 TO 3 YEARS THAT SURGERY AND CT SCANS WOULD NOT BE THE BEST ROUTE TO TAKE AS TO IT MAY MAKE HIS PROBLEMS WORSE.    SURGEON IS SCHEDULED TO TALK TO PTS SON TODAY AND DAUGHTER IS VERY WORRIED THIS IS NOT THE BEST ROUTE AND WOULD LIKE A CALL FROM SOMEONE IN THE PRACTICE.    NATE REQUESTED A CALL [phone]

## 2021-03-10 ENCOUNTER — READMISSION MANAGEMENT (OUTPATIENT)
Dept: CALL CENTER | Facility: HOSPITAL | Age: 86
End: 2021-03-10

## 2021-03-10 ENCOUNTER — APPOINTMENT (OUTPATIENT)
Dept: GENERAL RADIOLOGY | Facility: HOSPITAL | Age: 86
End: 2021-03-10

## 2021-03-10 VITALS
RESPIRATION RATE: 16 BRPM | DIASTOLIC BLOOD PRESSURE: 73 MMHG | TEMPERATURE: 98.1 F | HEART RATE: 73 BPM | OXYGEN SATURATION: 95 % | BODY MASS INDEX: 26.52 KG/M2 | SYSTOLIC BLOOD PRESSURE: 130 MMHG | HEIGHT: 66 IN | WEIGHT: 165 LBS

## 2021-03-10 LAB
ANION GAP SERPL CALCULATED.3IONS-SCNC: 10 MMOL/L (ref 5–15)
BASOPHILS # BLD AUTO: 0 10*3/MM3 (ref 0–0.2)
BASOPHILS NFR BLD AUTO: 0.2 % (ref 0–1.5)
BUN SERPL-MCNC: 31 MG/DL (ref 8–23)
BUN/CREAT SERPL: 18 (ref 7–25)
CALCIUM SPEC-SCNC: 8.6 MG/DL (ref 8.6–10.5)
CHLORIDE SERPL-SCNC: 106 MMOL/L (ref 98–107)
CO2 SERPL-SCNC: 26 MMOL/L (ref 22–29)
CREAT SERPL-MCNC: 1.72 MG/DL (ref 0.76–1.27)
DEPRECATED RDW RBC AUTO: 43.8 FL (ref 37–54)
EOSINOPHIL # BLD AUTO: 0 10*3/MM3 (ref 0–0.4)
EOSINOPHIL NFR BLD AUTO: 0.7 % (ref 0.3–6.2)
ERYTHROCYTE [DISTWIDTH] IN BLOOD BY AUTOMATED COUNT: 12.9 % (ref 12.3–15.4)
GFR SERPL CREATININE-BSD FRML MDRD: 38 ML/MIN/1.73
GLUCOSE BLDC GLUCOMTR-MCNC: 119 MG/DL (ref 70–105)
GLUCOSE BLDC GLUCOMTR-MCNC: 129 MG/DL (ref 70–105)
GLUCOSE SERPL-MCNC: 146 MG/DL (ref 65–99)
HBA1C MFR BLD: 7.5 % (ref 3.5–5.6)
HCT VFR BLD AUTO: 35.5 % (ref 37.5–51)
HGB BLD-MCNC: 12.3 G/DL (ref 13–17.7)
LYMPHOCYTES # BLD AUTO: 0.7 10*3/MM3 (ref 0.7–3.1)
LYMPHOCYTES NFR BLD AUTO: 11.1 % (ref 19.6–45.3)
MAGNESIUM SERPL-MCNC: 1.7 MG/DL (ref 1.6–2.4)
MCH RBC QN AUTO: 33 PG (ref 26.6–33)
MCHC RBC AUTO-ENTMCNC: 34.8 G/DL (ref 31.5–35.7)
MCV RBC AUTO: 94.9 FL (ref 79–97)
MONOCYTES # BLD AUTO: 0.5 10*3/MM3 (ref 0.1–0.9)
MONOCYTES NFR BLD AUTO: 9.1 % (ref 5–12)
NEUTROPHILS NFR BLD AUTO: 4.7 10*3/MM3 (ref 1.7–7)
NEUTROPHILS NFR BLD AUTO: 78.9 % (ref 42.7–76)
NRBC BLD AUTO-RTO: 0.1 /100 WBC (ref 0–0.2)
PLATELET # BLD AUTO: 145 10*3/MM3 (ref 140–450)
PMV BLD AUTO: 8.5 FL (ref 6–12)
POTASSIUM SERPL-SCNC: 4.3 MMOL/L (ref 3.5–5.2)
RBC # BLD AUTO: 3.74 10*6/MM3 (ref 4.14–5.8)
SODIUM SERPL-SCNC: 142 MMOL/L (ref 136–145)
TROPONIN T SERPL-MCNC: <0.01 NG/ML (ref 0–0.03)
WBC # BLD AUTO: 6 10*3/MM3 (ref 3.4–10.8)

## 2021-03-10 PROCEDURE — 82962 GLUCOSE BLOOD TEST: CPT

## 2021-03-10 PROCEDURE — 84484 ASSAY OF TROPONIN QUANT: CPT | Performed by: INTERNAL MEDICINE

## 2021-03-10 PROCEDURE — 83735 ASSAY OF MAGNESIUM: CPT | Performed by: NURSE PRACTITIONER

## 2021-03-10 PROCEDURE — 80048 BASIC METABOLIC PNL TOTAL CA: CPT | Performed by: NURSE PRACTITIONER

## 2021-03-10 PROCEDURE — 99213 OFFICE O/P EST LOW 20 MIN: CPT | Performed by: SURGERY

## 2021-03-10 PROCEDURE — 96376 TX/PRO/DX INJ SAME DRUG ADON: CPT

## 2021-03-10 PROCEDURE — G0378 HOSPITAL OBSERVATION PER HR: HCPCS

## 2021-03-10 PROCEDURE — 99217 PR OBSERVATION CARE DISCHARGE MANAGEMENT: CPT | Performed by: INTERNAL MEDICINE

## 2021-03-10 PROCEDURE — 85025 COMPLETE CBC W/AUTO DIFF WBC: CPT | Performed by: NURSE PRACTITIONER

## 2021-03-10 PROCEDURE — 83036 HEMOGLOBIN GLYCOSYLATED A1C: CPT | Performed by: NURSE PRACTITIONER

## 2021-03-10 PROCEDURE — 96361 HYDRATE IV INFUSION ADD-ON: CPT

## 2021-03-10 RX ADMIN — Medication 10 ML: at 08:33

## 2021-03-10 RX ADMIN — SODIUM CHLORIDE, POTASSIUM CHLORIDE, SODIUM LACTATE AND CALCIUM CHLORIDE 75 ML/HR: 600; 310; 30; 20 INJECTION, SOLUTION INTRAVENOUS at 12:50

## 2021-03-10 RX ADMIN — FAMOTIDINE 20 MG: 10 INJECTION INTRAVENOUS at 08:32

## 2021-03-10 NOTE — PLAN OF CARE
Goal Outcome Evaluation:  Plan of Care Reviewed With: patient     Outcome Summary: patient resting in bed throughout shift. patient has no complaints of abdominal pain this shift. will continue to monitor

## 2021-03-10 NOTE — PLAN OF CARE
Pt presented with no new acute issues this shift, pt denies any reports or complaints of pain, nausea, or vomiting this shift.  Pt NPO after midnight for small bowel follow through, pt and family educated.  Pt resting well in bed, will continue to monitor and observe.    Problem: Adult Inpatient Plan of Care  Goal: Plan of Care Review  Flowsheets (Taken 3/10/2021 2710)  Progress: no change  Plan of Care Reviewed With: patient     Problem: Nausea and Vomiting (Intestinal Obstruction)  Goal: Nausea and Vomiting Relief  Outcome: Ongoing, Progressing     Problem: Pain (Intestinal Obstruction)  Goal: Acceptable Pain Control  Outcome: Ongoing, Progressing         Tarsorrhaphy Text: A tarsorrhaphy was performed using Frost sutures.

## 2021-03-10 NOTE — OUTREACH NOTE
Prep Survey      Responses   [facility] facility patient discharged from?  [facility]   Is LACE score < 7 ?  Yes   Emergency Room discharge w/ pulse ox?  No   Eligibility  TCM   Hospital  [facility]   Date of Admission  03/09/21   Date of Discharge  03/10/21   Discharge Disposition  Home or Self Care   Discharge diagnosis  Small bowel obstruction   Does the patient have one of the following disease processes/diagnoses(primary or secondary)?  Other   Does the patient have Home health ordered?  No   Is there a DME ordered?  No   Prep survey completed?  Yes          Gabriela Horton RN

## 2021-03-10 NOTE — PROGRESS NOTES
General Surgery Progress Note    Name: Mark Busch Jr. ADMIT: 3/9/2021   : 1933  PCP: Jack Huynh MD    MRN: [id number] LOS: 0 days   AGE/SEX: 87 y.o. male  ROOM: [address] [place] [address]    Chief Complaint   Patient presents with   • Vomiting     Subjective     87 y.o. male with history of rectal cancer status post rectal resection and colostomy radiation who is here with adhesive bowel obstruction    Objective   Overnight no nausea vomiting fevers chills.  His abdominal pain has resolved he has had a couple of good bowel movements through his colostomy.  He has refused the small bowel follow-through and says he wants to eat and be discharged home since he has dealt with this many times in the past.    Scheduled Medications:   famotidine, 20 mg, Intravenous, Q12H  insulin lispro, 0-7 Units, Subcutaneous, Q6H  sodium chloride, 10 mL, Intravenous, Q12H        Active Infusions:  lactated ringers, 75 mL/hr, Last Rate: 75 mL/hr (03/10/21 1250)        As Needed Medications:  •  acetaminophen **OR** acetaminophen **OR** acetaminophen  •  aluminum-magnesium hydroxide-simethicone  •  bisacodyl  •  dextrose  •  dextrose  •  glucagon (human recombinant)  •  insulin lispro **AND** insulin lispro  •  ketorolac  •  labetalol  •  magnesium sulfate **OR** magnesium sulfate in D5W 1g/100mL (PREMIX)  •  melatonin  •  ondansetron **OR** ondansetron  •  potassium chloride  •  sodium chloride  •  sodium chloride    Vital Signs  Vital Signs (range)  Temp:  [97.5 °F (36.4 °C)-98.1 °F (36.7 °C)] 98.1 °F (36.7 °C)  Heart Rate:  [72-86] 73  Resp:  [16-17] 16  BP: (110-130)/(62-73) 130/73    Physical Exam:  Physical Exam  Constitutional:       Appearance: Normal appearance.   HENT:      Head: Normocephalic and atraumatic.   Cardiovascular:      Rate and Rhythm: Normal rate.   Pulmonary:      Effort: Pulmonary effort is normal. No respiratory distress.   Abdominal:      General: There is no distension.      Palpations: Abdomen is soft.       Tenderness: There is no abdominal tenderness.   Skin:     General: Skin is warm and dry.   Neurological:      General: No focal deficit present.      Mental Status: He is alert. Mental status is at baseline.   Psychiatric:         Mood and Affect: Mood normal.         Behavior: Behavior normal.         Results Review:     CBC    Results from last 7 days   Lab Units 03/10/21  1108 03/09/21  0745   WBC 10*3/mm3 6.00 13.00*   HEMOGLOBIN g/dL 12.3* 15.0   PLATELETS 10*3/mm3 145 202     BMP   Results from last 7 days   Lab Units 03/10/21  0644 03/09/21  0745   SODIUM mmol/L 142 138   POTASSIUM mmol/L 4.3 4.0   CHLORIDE mmol/L 106 99   CO2 mmol/L 26.0 25.0   BUN mg/dL 31* 18   CREATININE mg/dL 1.72* 1.04   GLUCOSE mg/dL 146* 185*   MAGNESIUM mg/dL 1.7  --        I reviewed the patient's new clinical results.    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 male with likely adhesive disease in the pelvis causing small bowel obstruction intermittently.    Once again counseled about the etiology of his bowel obstruction and the possibility for surgery in the future.  He understands.  He wants to eat some food and wants to be discharged home today.  I talked to him a little bit about low residue diet and avoiding bulky foods which could contribute to bowel obstruction.         This note was created using Dragon Voice Recognition software.    Adrian Bowens MD  03/10/21  13:04 EST

## 2021-03-10 NOTE — PROGRESS NOTES
Discharge Planning Assessment   [facility]     Patient Name: Mark Busch Jr.  MRN: [id number]  Today's Date: 3/10/2021    Admit Date: 3/9/2021    Discharge Needs Assessment     Row Name 03/10/21 1351       Living Environment    Lives With  child(natalio), adult    Current Living Arrangements  home/apartment/condo    Potentially Unsafe Housing Conditions  unable to assess    Primary Care Provided by  self    Provides Primary Care For  no one    Family Caregiver if Needed  child(natalio), adult    Quality of Family Relationships  unable to assess    Able to Return to Prior Arrangements  yes       Resource/Environmental Concerns    Resource/Environmental Concerns  none       Transition Planning    Patient/Family Anticipates Transition to  home with family    Patient/Family Anticipated Services at Transition      Transportation Anticipated  car, drives self;family or friend will provide       Discharge Needs Assessment    Readmission Within the Last 30 Days  no previous admission in last 30 days    Equipment Currently Used at Home  none    Concerns to be Addressed  no discharge needs identified        Discharge Plan     Row Name 03/10/21 8271       Plan    Plan  anticipate return home    Patient/Family in Agreement with Plan  yes    Plan Comments  spoke to patient in room with ppe(mask and goggles); staying 6 feet away and less than 15 mins; he states he lives with his daughter; he drives and will follow with pa in dr monge office; [facility] [address]; he denies any needs for discharge; barrier to discharge is bowel obstruction and treatment follow up         General Information    Admission Type  observation    Arrived From  emergency department    Required Notices Provided  Observation Status Notice    Preferred Language  English     Used During This Interaction  no         Functional Status    Usual Activity Tolerance  fair    Current Activity Tolerance  fair       Functional Status, IADL     Medications  independent    Meal Preparation  independent       Carol naegele rn  Case management  Office number [phone]  Cell phone [phone]

## 2021-03-10 NOTE — DISCHARGE SUMMARY
[facility] Medicine Services  DISCHARGE SUMMARY        Prepared For PCP:  Jack Huynh MD    Patient Name: Mark Busch Jr.  : 1933  MRN: [id number]      Date of Admission:   3/9/2021    Date of Discharge:  3/10/2021    Length of stay:  LOS: 0 days     Hospital Course     Presenting Problem:   Small bowel obstruction (CMS/HCC) [K56.609]  Abdominal pain, unspecified abdominal location [R10.9]  Nausea and vomiting, intractability of vomiting not specified, unspecified vomiting type [R11.2]      Active Hospital Problems    Diagnosis  POA   • **Small bowel obstruction (CMS/HCC) [K56.609]  Yes   • Intractable vomiting with nausea [R11.2]  Yes   • Acute hyperglycemia [R73.9]  Yes   • Hypertension [I10]  Yes   • Cholelithiasis [K80.20]  Yes   • Renal cyst, right [N28.1]  Not Applicable   • GERD (gastroesophageal reflux disease) [K21.9]  Yes   • Hyperlipidemia [E78.5]  Yes   • Prediabetes [R73.03]  Yes   • Overweight (BMI 25.0-29.9) [E66.3]  Yes   • 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           Hospital Course:  Mark Busch Jr. is a 87 y.o. male         Small bowel obstruction  HPI   Mr. Busch is a 87 y.o. male with a history of rectal cancer status post colon resection with colostomy placement, hypothyroidism, hyperlipidemia, anxiety, depression, GERD, seizures, and pre-diabetes who presents to [facility] ED on 2021 complaining of nausea and vomiting. The patient states his symptoms started around 5 pm last night. He reports projectile vomiting. He denies hematemesis. He reports periumbilical abdominal pain. He denies fever or chills. He denies any  exacerbating or alleviating factors.      Upon arrival to the ER the patient was given 1 liter normal saline and Reglan 10 mg IV. His CBC, CMP, and lipase were unremarkable except WBC 13.00 and glucose 185. The patient was hypertensive with BP 180s/100s. He denies a history of hypertension, but takes a beta blocker at home. His CT abd/pelvis without contrast showed a high-grade small bowel obstruction. He reportedly refused a nasogastric tube for decompression. General surgery was consulted. He was admitted for further treatment.      After admission patient was kept n.p.o. and start IV fluids  His bowel movements have improved and had bowel most x2 this morning  He feels much better today and wants to go home and does not want to follow-up x-ray abdomen small bowel follow-through as suggested by general surgeon who is following Dr. Bowens.  Has been cleared by Dr. Bowens for discharge-tolerating diet  We will start on full liquid diet to be advanced as tolerated in the next 24 hours at home.  May discharge later today if no vomiting or severe abdominal pain after initiating diet  Family will available at the bedside yesterday and today  I discussed episode of what seems to be vasovagal syncope that he had several times before has been tested for that as well in the past according to the daughter  She is reluctant to do any further testing at this time and she thinks it is mostly from the violent vomiting episode he had on the day of admission  Patient troponins been negative and no telemetry findings of arrhythmias notedOn his chart        Intractable vomiting with nausea  -PRN antiemetics  -IVF  -IV Pepcid q12h     Acute hyperglycemia with h/o pre-diabetes  -check A1c  -accu checks q6h with SSI     Hypertension  -patient denies h/o HTN, but takes pindolol at home  -IV labetalol q4h PRN SBP>160  -monitor BP     Depression  Generalized anxiety disorder  -hold Zoloft and trazodone while NPO     Obstructive sleep  apnea  -patient does not wear CPAP     History of rectal cancer, s/p resection with colostomy     Hypothyroidism  -hold levothyroxine while NPO     GERD (gastroesophageal reflux disease)  -hold oral PPI while NPO  -IV Pepcid as above     Hyperlipidemia  -hold statin while NPO     Overweight (BMI 25.0-29.9)  -BMI 26.63 on admission  -encourage lifestyle modifications      History of seizure  -hold Trileptal while NPO     Cholelithiasis without cholecystitis  -incidental finding on CT scan     Right renal cyst  -incidental finding on CT scan          Recommendation for Outpatient Providers:     Full liquid diet today and may advance to low residue diet tomorrow  Follow-up with PCP next week and general surgeon as instructed  Report to ER if persistent abdominal pain or vomiting        Reasons For Change In Medications and Indications for New Medications:        Day of Discharge     HPI:       Vital Signs:   Temp:  [97.5 °F (36.4 °C)-98.1 °F (36.7 °C)] 98.1 °F (36.7 °C)  Heart Rate:  [72-86] 73  Resp:  [16-17] 16  BP: (110-130)/(62-73) 130/73     Physical Exam:  Physical Exam  Abdomen soft nontender  Cardiac exam S1-2 heard no extra sounds with murmur systolic in nature noted on the base.    Pertinent  and/or Most Recent Results     Results from last 7 days   Lab Units 03/10/21  1108 03/10/21  0644 03/09/21  0745   WBC 10*3/mm3 6.00  --  13.00*   HEMOGLOBIN g/dL 12.3*  --  15.0   HEMATOCRIT % 35.5*  --  44.0   PLATELETS 10*3/mm3 145  --  202   SODIUM mmol/L  --  142 138   POTASSIUM mmol/L  --  4.3 4.0   CHLORIDE mmol/L  --  106 99   CO2 mmol/L  --  26.0 25.0   BUN mg/dL  --  31* 18   CREATININE mg/dL  --  1.72* 1.04   GLUCOSE mg/dL  --  146* 185*   CALCIUM mg/dL  --  8.6 10.5     Results from last 7 days   Lab Units 03/09/21  0745   BILIRUBIN mg/dL 0.5   ALK PHOS U/L 112   ALT (SGPT) U/L 16   AST (SGOT) U/L 17           Invalid input(s): TG, LDLCALC, LDLREALC  Results from last 7 days   Lab Units 03/10/21  0644  03/09/21  0745   TROPONIN T ng/mL <0.010  --    PROCALCITONIN ng/mL  --  0.05       Brief Urine Lab Results  (Last result in the past 365 days)      Color   Clarity   Blood   Leuk Est   Nitrite   Protein   CREAT   Urine HCG        03/09/21 0945 Yellow Clear Negative Negative Negative Trace               Microbiology Results Abnormal     Procedure Component Value - Date/Time    COVID-19, ABBOTT IN-HOUSE,NASAL Swab (NO TRANSPORT MEDIA) 2 HR TAT - Swab, Nasopharynx [[id number]]  (Normal) Collected: 03/09/21 0804    Lab Status: Final result Specimen: Swab from Nasopharynx Updated: 03/09/21 0824     COVID19 Presumptive Negative    Narrative:      Fact sheet for providers: https://www.fda.gov/media/299312/download     Fact sheet for patients: https://www.fda.gov/media/037633/download    Test performed by PCR.  If inconsistent with clinical signs and symptoms patient should be tested with different authorized molecular test.          CT Abdomen Pelvis Without Contrast    Result Date: 3/9/2021  Impression:  1. FINDINGS consistent with the appearance of high-grade small bowel obstruction. Transition zone is demonstrated within the presacral pelvis, presumably due to adhesion. 2. Distal colectomy, prostatectomy, left lower quadrant colostomy. 3. Fluid distention of the lower thoracic esophagus, and significant air-fluid distention of the stomach. This places the patient at increased risk for aspiration. Consider NG tube decompression. 4. Additional findings include: Uncomplicated cholelithiasis, right renal cyst.  Electronically Signed By-Teena Molina MD On:3/9/2021 8:53 AM This report was finalized on [id number] by  Teena Molina MD.                          Test Results Pending at Discharge  Pending Labs     Order Current Status    Hemoglobin A1c In process            Procedures Performed           Consults:   Consults     Date and Time Order Name Status Description    3/9/2021  9:20 AM Surgery (on-call MD unless  specified) Completed     3/9/2021  9:20 AM Hospitalist (on-call MD unless specified) Completed             Discharge Details        Discharge Medications      Continue These Medications      Instructions Start Date   atorvastatin 20 MG tablet  Commonly known as: LIPITOR   20 mg, Oral, Nightly      coenzyme Q10 100 MG capsule   100 mg, Oral, Daily, HELD FOR OR       diphenhydrAMINE 25 mg capsule  Commonly known as: BENADRYL   25 mg, Oral, Nightly PRN      levothyroxine 25 MCG tablet  Commonly known as: SYNTHROID, LEVOTHROID   25 mcg, Oral, Daily      ondansetron 8 MG tablet  Commonly known as: ZOFRAN   8 mg, Oral, Every 8 Hours PRN      OXcarbazepine 300 MG tablet  Commonly known as: TRILEPTAL   Take 1 tablet by mouth twice daily      pantoprazole 40 MG EC tablet  Commonly known as: PROTONIX   40 mg, Oral, Daily      pindolol 5 MG tablet  Commonly known as: VISKEN   5 mg, Oral, 2 Times Daily      sertraline 50 MG tablet  Commonly known as: ZOLOFT   One and a half daily      traZODone 50 MG tablet  Commonly known as: DESYREL   50 mg, Oral, Every Night at Bedtime             No Known Allergies      Discharge Disposition:  Home or Self Care    Diet:  Hospital:  Diet Order   Procedures   • Diet Liquids; Full Liquid         Discharge Activity:         CODE STATUS:    Code Status and Medical Interventions:   Ordered at: 03/09/21 1014     Code Status:    CPR     Medical Interventions (Level of Support Prior to Arrest):    Full         Follow-up Appointments  Future Appointments   Date Time Provider Department Center   3/30/2021 10:15 AM WILLAM Paz MD [facility]   4/12/2021  8:30 AM Andrea Olmstead PA-C [facility]             Condition on Discharge:      Stable      This patient has been examined wearing appropriate Personal Protective Equipment and discussed with hospital infection control department. 03/10/21      Electronically signed by Jeff Burden MD, 03/10/21, 1:12 PM EST.      Time: I spent 40  minutes on this discharge activity which included face-to-face encounter with the patient/reviewing the data in the system/coordination of the care with the nursing staff as well as consultants/documentation/entering orders.

## 2021-03-10 NOTE — PLAN OF CARE
Pt with anticipated hospital d/c and no PT needs at this time per RN.  PT did not enter room.    Jennyfer Prieto PT, DPT, [facility]

## 2021-03-11 ENCOUNTER — TRANSITIONAL CARE MANAGEMENT TELEPHONE ENCOUNTER (OUTPATIENT)
Dept: CALL CENTER | Facility: HOSPITAL | Age: 86
End: 2021-03-11

## 2021-03-11 NOTE — OUTREACH NOTE
Call Center TCM Note      Responses   [facility] [place] patient discharged from?  [facility]   Does the patient have one of the following disease processes/diagnoses(primary or secondary)?  Other   TCM attempt successful?  Yes   Discharge diagnosis  Small bowel obstruction   Meds reviewed with patient/caregiver?  Yes   Does the patient have all medications ordered at discharge?  N/A   Prescription comments  No changes made to pt meds   Does the patient have a primary care provider?   Yes   Does the patient have an appointment with their PCP within 7 days of discharge?  No   Comments regarding PCP  Pt established with Dr Huynh, will start seeing Andrea Olmstead 04/12/2021. Pt would like to be seen sooner, at least for TCM FWP if ofc can review SHAINA Olmstead sched for TCM FWP to be completed by 03/24/2021.   Has the patient kept scheduled appointments due by today?  N/A   Has home health visited the patient within 72 hours of discharge?  N/A   Psychosocial issues?  No   Did the patient receive a copy of their discharge instructions?  Yes   Nursing interventions  Reviewed instructions with patient   What is the patient's perception of their health status since discharge?  Improving   Is the patient/caregiver able to teach back signs and symptoms related to disease process for when to call PCP?  Yes   Is the patient/caregiver able to teach back signs and symptoms related to disease process for when to call 911?  Yes   Is the patient/caregiver able to teach back the hierarchy of who to call/visit for symptoms/problems? PCP, Specialist, Home health nurse, Urgent Care, ED, 911  Yes   If the patient is a current smoker, are they able to teach back resources for cessation?  Not a smoker   TCM call completed?  Yes   Wrap up additional comments  PT very tired, but no recurrence of abdominal pain, N/V. No changes made to pt meds. Pt established with Dr Huynh, will start seeing Andrea Olmstead 04/12/2021. Pt would like to be seen  sooner, at least for TCM FWP if ofc can review SHAINA Olmstead sched for TCM FWP to be completed by 03/24/2021.          Talia Kaur MA    3/11/2021, 14:54 EST

## 2021-03-18 NOTE — TELEPHONE ENCOUNTER
Caller: Mrak Busch Jr.    Relationship: Self    Best call back number: [phone]    Medication needed:   Requested Prescriptions     Pending Prescriptions Disp Refills   • pindolol (VISKEN) 5 MG tablet 60 tablet 6     Sig: Take 1 tablet by mouth 2 (Two) Times a Day.       When do you need the refill by: 03/24    What additional details did the patient provide when requesting the medication: PT HAS A WEEK SUPPLY LEFT     Does the patient have less than a 3 day supply:  [] Yes  [x] No    What is the patient's preferred pharmacy: [facility] [address] [place] [address] [phone] [facility] [phone]

## 2021-03-22 RX ORDER — PINDOLOL 5 MG/1
5 TABLET ORAL 2 TIMES DAILY
Qty: 60 TABLET | Refills: 6 | Status: SHIPPED | OUTPATIENT
Start: 2021-03-22 | End: 2021-08-04

## 2021-03-27 RX ORDER — OXCARBAZEPINE 300 MG/1
300 TABLET, FILM COATED ORAL 2 TIMES DAILY
Qty: 180 TABLET | Refills: 1 | Status: SHIPPED | OUTPATIENT
Start: 2021-03-27 | End: 2021-08-04

## 2021-04-01 ENCOUNTER — OFFICE VISIT (OUTPATIENT)
Dept: CARDIOLOGY | Facility: CLINIC | Age: 86
End: 2021-04-01

## 2021-04-01 VITALS
HEIGHT: 66 IN | OXYGEN SATURATION: 99 % | WEIGHT: 166 LBS | SYSTOLIC BLOOD PRESSURE: 156 MMHG | DIASTOLIC BLOOD PRESSURE: 80 MMHG | BODY MASS INDEX: 26.68 KG/M2 | HEART RATE: 56 BPM

## 2021-04-01 DIAGNOSIS — E78.2 MIXED HYPERLIPIDEMIA: ICD-10-CM

## 2021-04-01 DIAGNOSIS — I49.5 SICK SINUS SYNDROME (HCC): Primary | ICD-10-CM

## 2021-04-01 DIAGNOSIS — I10 ESSENTIAL HYPERTENSION: ICD-10-CM

## 2021-04-01 PROCEDURE — 99204 OFFICE O/P NEW MOD 45 MIN: CPT | Performed by: INTERNAL MEDICINE

## 2021-04-01 PROCEDURE — 93000 ELECTROCARDIOGRAM COMPLETE: CPT | Performed by: INTERNAL MEDICINE

## 2021-04-12 ENCOUNTER — OFFICE VISIT (OUTPATIENT)
Dept: FAMILY MEDICINE CLINIC | Facility: CLINIC | Age: 86
End: 2021-04-12

## 2021-04-12 ENCOUNTER — LAB (OUTPATIENT)
Dept: FAMILY MEDICINE CLINIC | Facility: CLINIC | Age: 86
End: 2021-04-12

## 2021-04-12 VITALS
TEMPERATURE: 96.6 F | WEIGHT: 165 LBS | HEART RATE: 62 BPM | SYSTOLIC BLOOD PRESSURE: 150 MMHG | OXYGEN SATURATION: 93 % | BODY MASS INDEX: 26.52 KG/M2 | DIASTOLIC BLOOD PRESSURE: 80 MMHG | RESPIRATION RATE: 16 BRPM | HEIGHT: 66 IN

## 2021-04-12 DIAGNOSIS — R79.89 ELEVATED TSH: ICD-10-CM

## 2021-04-12 DIAGNOSIS — E03.9 HYPOTHYROIDISM, UNSPECIFIED TYPE: ICD-10-CM

## 2021-04-12 DIAGNOSIS — N17.9 AKI (ACUTE KIDNEY INJURY) (HCC): ICD-10-CM

## 2021-04-12 DIAGNOSIS — Z87.19 HISTORY OF SMALL BOWEL OBSTRUCTION: ICD-10-CM

## 2021-04-12 DIAGNOSIS — I10 ESSENTIAL HYPERTENSION: ICD-10-CM

## 2021-04-12 DIAGNOSIS — F33.42 RECURRENT MAJOR DEPRESSIVE DISORDER, IN FULL REMISSION (HCC): Chronic | ICD-10-CM

## 2021-04-12 DIAGNOSIS — E11.9 TYPE 2 DIABETES MELLITUS WITHOUT COMPLICATION, WITHOUT LONG-TERM CURRENT USE OF INSULIN (HCC): Primary | ICD-10-CM

## 2021-04-12 PROBLEM — R27.0 ATAXIA: Status: RESOLVED | Noted: 2020-08-06 | Resolved: 2021-04-12

## 2021-04-12 PROBLEM — K56.609 SMALL BOWEL OBSTRUCTION (HCC): Status: RESOLVED | Noted: 2021-03-09 | Resolved: 2021-04-12

## 2021-04-12 PROBLEM — F51.01 PRIMARY INSOMNIA: Status: ACTIVE | Noted: 2021-04-12

## 2021-04-12 PROBLEM — M50.90 DISORDER OF INTERVERTEBRAL DISC OF CERVICAL SPINE: Status: RESOLVED | Noted: 2018-08-31 | Resolved: 2021-04-12

## 2021-04-12 PROBLEM — R73.9 ACUTE HYPERGLYCEMIA: Status: RESOLVED | Noted: 2021-03-09 | Resolved: 2021-04-12

## 2021-04-12 PROBLEM — R11.2 INTRACTABLE VOMITING WITH NAUSEA: Status: RESOLVED | Noted: 2021-03-09 | Resolved: 2021-04-12

## 2021-04-12 PROBLEM — M65.30 TRIGGER FINGER: Status: RESOLVED | Noted: 2018-04-18 | Resolved: 2021-04-12

## 2021-04-12 PROBLEM — R56.9 SEIZURE-LIKE ACTIVITY (HCC): Status: ACTIVE | Noted: 2021-04-12

## 2021-04-12 PROBLEM — K80.20 CHOLELITHIASIS: Status: RESOLVED | Noted: 2021-03-09 | Resolved: 2021-04-12

## 2021-04-12 PROBLEM — N28.1 RENAL CYST, RIGHT: Status: RESOLVED | Noted: 2021-03-09 | Resolved: 2021-04-12

## 2021-04-12 LAB
ANION GAP SERPL CALCULATED.3IONS-SCNC: 9.9 MMOL/L (ref 5–15)
BUN SERPL-MCNC: 15 MG/DL (ref 8–23)
BUN/CREAT SERPL: 18.5 (ref 7–25)
CALCIUM SPEC-SCNC: 9.4 MG/DL (ref 8.6–10.5)
CHLORIDE SERPL-SCNC: 101 MMOL/L (ref 98–107)
CO2 SERPL-SCNC: 28.1 MMOL/L (ref 22–29)
CREAT SERPL-MCNC: 0.81 MG/DL (ref 0.76–1.27)
GFR SERPL CREATININE-BSD FRML MDRD: 90 ML/MIN/1.73
GLUCOSE SERPL-MCNC: 131 MG/DL (ref 65–99)
POTASSIUM SERPL-SCNC: 4.4 MMOL/L (ref 3.5–5.2)
SODIUM SERPL-SCNC: 139 MMOL/L (ref 136–145)
TSH SERPL DL<=0.05 MIU/L-ACNC: 4.31 UIU/ML (ref 0.27–4.2)

## 2021-04-12 PROCEDURE — 84443 ASSAY THYROID STIM HORMONE: CPT | Performed by: PHYSICIAN ASSISTANT

## 2021-04-12 PROCEDURE — 36415 COLL VENOUS BLD VENIPUNCTURE: CPT

## 2021-04-12 PROCEDURE — 99214 OFFICE O/P EST MOD 30 MIN: CPT | Performed by: PHYSICIAN ASSISTANT

## 2021-04-12 PROCEDURE — 80048 BASIC METABOLIC PNL TOTAL CA: CPT | Performed by: PHYSICIAN ASSISTANT

## 2021-04-12 RX ORDER — CETIRIZINE HYDROCHLORIDE 10 MG/1
10 TABLET ORAL DAILY
COMMUNITY

## 2021-04-12 NOTE — PROGRESS NOTES
"Subjective   Mark Busch Jr. is a 87 y.o. male.     Chief Complaint   Patient presents with   • Diabetes   • Hypertension       /80 (BP Location: Left arm)   Pulse 62   Temp 96.6 °F (35.9 °C) (Infrared)   Resp 16   Ht 167.6 cm (66\")   Wt 74.8 kg (165 lb)   SpO2 93%   BMI 26.63 kg/m²     BP Readings from Last 3 Encounters:   04/12/21 150/80   04/01/21 156/80   03/10/21 130/73       Wt Readings from Last 3 Encounters:   04/12/21 74.8 kg (165 lb)   04/01/21 75.3 kg (166 lb)   03/09/21 74.8 kg (165 lb)       HPI patient presents to the clinic to establish care with me after Dr. Amina singh for diabetes, hypertension, and recurrent small bowel obstruction.  He was recently admitted to the hospital for small bowel obstruction that resolved.  He has recurrent small bowel obstruction and when he has the small bowel obstruction he has what he describes as seizures.  He is taking Trileptal for these episodes and has been on this medication for quite some time.  He is following with Dr. Paz with cardiology for possible sick sinus syndrome.  He denies having any new complaints at this time.    The following portions of the patient's history were reviewed and updated as appropriate: allergies, current medications, past family history, past medical history, past social history, past surgical history and problem list.    Review of Systems    Objective   Physical Exam  Constitutional:       Appearance: He is well-developed.   HENT:      Head: Normocephalic and atraumatic.   Eyes:      Conjunctiva/sclera: Conjunctivae normal.      Pupils: Pupils are equal, round, and reactive to light.   Cardiovascular:      Rate and Rhythm: Normal rate and regular rhythm.      Heart sounds: No murmur heard.     Pulmonary:      Effort: Pulmonary effort is normal.      Breath sounds: Normal breath sounds.   Abdominal:      General: Bowel sounds are normal.      Palpations: Abdomen is soft.      Tenderness: There is no abdominal " tenderness.   Musculoskeletal:         General: No deformity. Normal range of motion.      Cervical back: Normal range of motion and neck supple.   Lymphadenopathy:      Cervical: No cervical adenopathy.   Skin:     General: Skin is warm and dry.      Capillary Refill: Capillary refill takes less than 2 seconds.      Findings: No rash.   Neurological:      Mental Status: He is alert and oriented to person, place, and time.      Cranial Nerves: No cranial nerve deficit.   Psychiatric:         Behavior: Behavior normal.         Thought Content: Thought content normal.         Judgment: Judgment normal.           Diagnoses and all orders for this visit:    1. Type 2 diabetes mellitus without complication, without long-term current use of insulin (CMS/[facility]) (Primary)  Comments:  Just monitoring at this time- watch carbs/sugars- Under 8% seems to be a reasonable goal at his age. We discussed this and he is in agreement.     2. Hypothyroidism, unspecified type  Comments:  Was diagnosed recently by  and placed on leveothyroxine. He did have a low t4 with the TSH so we will continue synthroid- recheck TSH.   Orders:  -     TSH; Future    3. Recurrent major depressive disorder, in full remission (CMS/[facility])  Comments:  Continue Zoloft as he is doing very well on the medication. Lost wife and lives alone with his daughter.     4. Essential hypertension  Comments:  Continue pindolol- hard for him to fill and expensive- We could consider switching to an ace/arb due to dm or switch to a different non selective beta blocker    5. History of small bowel obstruction  Comments:  Recurrent issue- He is not interested in further operations. Unfortunately ends up in the hospital for this on multiple occasions.     6. DESIREE (acute kidney injury) (CMS/[facility])  Comments:  Repeat BMP from hospital stay to see if kidney function has returned to baseline- is not thirsty and does not urinate a whole lot- we will stop his benadryl.    Orders:  -     Basic metabolic panel; Future    I am not entirely sure if he is actually having seizures? What he is describing to me sounds more like vasovagal syncope with a myoclonic reaction. This can mimic seizures at times. He did have a subdural bleed from a fall, but his seizure like activity was present before this. This also seems to only occur when he is having issues with his bowels. I think it would be reasonable to stop his trileptal however he is reluctant to do this. He was diagnosed with ataxia at one point by  and this medication could be contributing.  I will stop his benadryl at this time which he was prescribed to take nightly for sleep. He has xerostomia, decreased thirst, and decreased urine output. He also had a couple episodes of hallucination recently. Of note, he did state that he enjoyed our visit today but his perception was that he would see an MD after  left. I discussed with him that our MD's are not currently accepting any patients, but I will reach out to them and see if someone is willing to take over his care. I will order his medicines and follow up with him in the meantime.     Return in about 4 months (around 8/12/2021).

## 2021-05-07 ENCOUNTER — TELEPHONE (OUTPATIENT)
Dept: FAMILY MEDICINE CLINIC | Facility: CLINIC | Age: 86
End: 2021-05-07

## 2021-05-07 NOTE — TELEPHONE ENCOUNTER
Caller: Mark Busch Jr.    Relationship to patient: Self    Best call back number: [phone]    Patient is needing:PATIENT CALLED AND SAID HE SAW AKASH DELGADO ON 04/12/21 AND HAD ON HIS VISIT SUMMARY THAT HE HAD AN ACUTE KIDNEY INJURY AND HE DOES NOT CURRENTLY HAVE THAT, WANTED TO LET DR. FISHER KNOW THAT BEFORE HIS APPOINTMENT WITH HIM

## 2021-05-28 RX ORDER — TRAZODONE HYDROCHLORIDE 50 MG/1
TABLET ORAL
Qty: 90 TABLET | Refills: 3 | Status: SHIPPED | OUTPATIENT
Start: 2021-05-28 | End: 2021-08-04

## 2021-08-04 ENCOUNTER — HOSPITAL ENCOUNTER (INPATIENT)
Facility: HOSPITAL | Age: 86
LOS: 1 days | Discharge: HOME | End: 2021-08-05
Attending: INTERNAL MEDICINE | Admitting: INTERNAL MEDICINE

## 2021-08-04 ENCOUNTER — APPOINTMENT (OUTPATIENT)
Dept: CT IMAGING | Facility: HOSPITAL | Age: 86
End: 2021-08-04

## 2021-08-04 ENCOUNTER — APPOINTMENT (OUTPATIENT)
Dept: GENERAL RADIOLOGY | Facility: HOSPITAL | Age: 86
End: 2021-08-04

## 2021-08-04 DIAGNOSIS — R11.2 NON-INTRACTABLE VOMITING WITH NAUSEA, UNSPECIFIED VOMITING TYPE: ICD-10-CM

## 2021-08-04 DIAGNOSIS — K56.609 SBO (SMALL BOWEL OBSTRUCTION) (HCC): Primary | ICD-10-CM

## 2021-08-04 LAB
ALBUMIN SERPL-MCNC: 4.4 G/DL (ref 3.5–5.2)
ALBUMIN/GLOB SERPL: 1.9 G/DL
ALP SERPL-CCNC: 103 U/L (ref 39–117)
ALT SERPL W P-5'-P-CCNC: 13 U/L (ref 1–41)
ANION GAP SERPL CALCULATED.3IONS-SCNC: 9 MMOL/L (ref 5–15)
AST SERPL-CCNC: 16 U/L (ref 1–40)
BASOPHILS # BLD AUTO: 0 10*3/MM3 (ref 0–0.2)
BASOPHILS NFR BLD AUTO: 0.1 % (ref 0–1.5)
BILIRUB SERPL-MCNC: 0.5 MG/DL (ref 0–1.2)
BILIRUB UR QL STRIP: NEGATIVE
BUN SERPL-MCNC: 13 MG/DL (ref 8–23)
BUN/CREAT SERPL: 14.8 (ref 7–25)
CALCIUM SPEC-SCNC: 9.2 MG/DL (ref 8.6–10.5)
CHLORIDE SERPL-SCNC: 101 MMOL/L (ref 98–107)
CLARITY UR: CLEAR
CO2 SERPL-SCNC: 27 MMOL/L (ref 22–29)
COLOR UR: YELLOW
CREAT SERPL-MCNC: 0.88 MG/DL (ref 0.76–1.27)
DEPRECATED RDW RBC AUTO: 42 FL (ref 37–54)
EOSINOPHIL # BLD AUTO: 0 10*3/MM3 (ref 0–0.4)
EOSINOPHIL NFR BLD AUTO: 0.2 % (ref 0.3–6.2)
ERYTHROCYTE [DISTWIDTH] IN BLOOD BY AUTOMATED COUNT: 12.7 % (ref 12.3–15.4)
GFR SERPL CREATININE-BSD FRML MDRD: 82 ML/MIN/1.73
GLOBULIN UR ELPH-MCNC: 2.3 GM/DL
GLUCOSE SERPL-MCNC: 165 MG/DL (ref 65–99)
GLUCOSE UR STRIP-MCNC: NEGATIVE MG/DL
HCT VFR BLD AUTO: 43.7 % (ref 37.5–51)
HGB BLD-MCNC: 14.7 G/DL (ref 13–17.7)
HGB UR QL STRIP.AUTO: NEGATIVE
HOLD SPECIMEN: NORMAL
KETONES UR QL STRIP: NEGATIVE
LEUKOCYTE ESTERASE UR QL STRIP.AUTO: NEGATIVE
LIPASE SERPL-CCNC: 16 U/L (ref 13–60)
LYMPHOCYTES # BLD AUTO: 0.5 10*3/MM3 (ref 0.7–3.1)
LYMPHOCYTES NFR BLD AUTO: 7 % (ref 19.6–45.3)
MCH RBC QN AUTO: 31.5 PG (ref 26.6–33)
MCHC RBC AUTO-ENTMCNC: 33.7 G/DL (ref 31.5–35.7)
MCV RBC AUTO: 93.6 FL (ref 79–97)
MONOCYTES # BLD AUTO: 0.5 10*3/MM3 (ref 0.1–0.9)
MONOCYTES NFR BLD AUTO: 7.3 % (ref 5–12)
NEUTROPHILS NFR BLD AUTO: 6.4 10*3/MM3 (ref 1.7–7)
NEUTROPHILS NFR BLD AUTO: 85.4 % (ref 42.7–76)
NITRITE UR QL STRIP: NEGATIVE
NRBC BLD AUTO-RTO: 0 /100 WBC (ref 0–0.2)
PH UR STRIP.AUTO: 7 [PH] (ref 5–8)
PLATELET # BLD AUTO: 180 10*3/MM3 (ref 140–450)
PMV BLD AUTO: 8.1 FL (ref 6–12)
POTASSIUM SERPL-SCNC: 4.3 MMOL/L (ref 3.5–5.2)
PROT SERPL-MCNC: 6.7 G/DL (ref 6–8.5)
PROT UR QL STRIP: NEGATIVE
RBC # BLD AUTO: 4.67 10*6/MM3 (ref 4.14–5.8)
SARS-COV-2 RNA PNL SPEC NAA+PROBE: NOT DETECTED
SODIUM SERPL-SCNC: 137 MMOL/L (ref 136–145)
SP GR UR STRIP: 1.02 (ref 1–1.03)
UROBILINOGEN UR QL STRIP: NORMAL
WBC # BLD AUTO: 7.5 10*3/MM3 (ref 3.4–10.8)

## 2021-08-04 PROCEDURE — 25010000002 DIPHENHYDRAMINE PER 50 MG: Performed by: NURSE PRACTITIONER

## 2021-08-04 PROCEDURE — 99284 EMERGENCY DEPT VISIT MOD MDM: CPT

## 2021-08-04 PROCEDURE — 0 IOPAMIDOL PER 1 ML: Performed by: NURSE PRACTITIONER

## 2021-08-04 PROCEDURE — 81003 URINALYSIS AUTO W/O SCOPE: CPT | Performed by: NURSE PRACTITIONER

## 2021-08-04 PROCEDURE — 87635 SARS-COV-2 COVID-19 AMP PRB: CPT | Performed by: NURSE PRACTITIONER

## 2021-08-04 PROCEDURE — 25010000002 ONDANSETRON PER 1 MG: Performed by: INTERNAL MEDICINE

## 2021-08-04 PROCEDURE — 71045 X-RAY EXAM CHEST 1 VIEW: CPT

## 2021-08-04 PROCEDURE — 80053 COMPREHEN METABOLIC PANEL: CPT | Performed by: NURSE PRACTITIONER

## 2021-08-04 PROCEDURE — 85025 COMPLETE CBC W/AUTO DIFF WBC: CPT | Performed by: NURSE PRACTITIONER

## 2021-08-04 PROCEDURE — 74177 CT ABD & PELVIS W/CONTRAST: CPT

## 2021-08-04 PROCEDURE — 25010000002 PROCHLORPERAZINE 10 MG/2ML SOLUTION: Performed by: NURSE PRACTITIONER

## 2021-08-04 PROCEDURE — 83690 ASSAY OF LIPASE: CPT | Performed by: NURSE PRACTITIONER

## 2021-08-04 PROCEDURE — 25010000002 ONDANSETRON PER 1 MG: Performed by: NURSE PRACTITIONER

## 2021-08-04 PROCEDURE — 99223 1ST HOSP IP/OBS HIGH 75: CPT | Performed by: INTERNAL MEDICINE

## 2021-08-04 RX ORDER — ONDANSETRON 2 MG/ML
4 INJECTION INTRAMUSCULAR; INTRAVENOUS EVERY 6 HOURS PRN
Status: DISCONTINUED | OUTPATIENT
Start: 2021-08-04 | End: 2021-08-05 | Stop reason: HOSPADM

## 2021-08-04 RX ORDER — ONDANSETRON 2 MG/ML
4 INJECTION INTRAMUSCULAR; INTRAVENOUS ONCE
Status: COMPLETED | OUTPATIENT
Start: 2021-08-04 | End: 2021-08-04

## 2021-08-04 RX ORDER — DIPHENHYDRAMINE HCL 25 MG
25 CAPSULE ORAL NIGHTLY
COMMUNITY
End: 2021-08-11

## 2021-08-04 RX ORDER — KETOROLAC TROMETHAMINE 15 MG/ML
15 INJECTION, SOLUTION INTRAMUSCULAR; INTRAVENOUS EVERY 6 HOURS PRN
Status: DISCONTINUED | OUTPATIENT
Start: 2021-08-04 | End: 2021-08-05 | Stop reason: HOSPADM

## 2021-08-04 RX ORDER — HEPARIN SODIUM 5000 [USP'U]/ML
5000 INJECTION, SOLUTION INTRAVENOUS; SUBCUTANEOUS EVERY 12 HOURS SCHEDULED
Status: DISCONTINUED | OUTPATIENT
Start: 2021-08-04 | End: 2021-08-05 | Stop reason: HOSPADM

## 2021-08-04 RX ORDER — SODIUM CHLORIDE, SODIUM LACTATE, POTASSIUM CHLORIDE, CALCIUM CHLORIDE 600; 310; 30; 20 MG/100ML; MG/100ML; MG/100ML; MG/100ML
100 INJECTION, SOLUTION INTRAVENOUS CONTINUOUS
Status: DISCONTINUED | OUTPATIENT
Start: 2021-08-04 | End: 2021-08-05 | Stop reason: HOSPADM

## 2021-08-04 RX ORDER — OXCARBAZEPINE 300 MG/1
300 TABLET, FILM COATED ORAL 2 TIMES DAILY
COMMUNITY
End: 2021-09-09

## 2021-08-04 RX ORDER — PANTOPRAZOLE SODIUM 40 MG/1
40 TABLET, DELAYED RELEASE ORAL DAILY
COMMUNITY
End: 2021-01-01 | Stop reason: SDUPTHER

## 2021-08-04 RX ORDER — SODIUM CHLORIDE 0.9 % (FLUSH) 0.9 %
10 SYRINGE (ML) INJECTION AS NEEDED
Status: DISCONTINUED | OUTPATIENT
Start: 2021-08-04 | End: 2021-08-05 | Stop reason: HOSPADM

## 2021-08-04 RX ORDER — UBIDECARENONE 100 MG
100 CAPSULE ORAL DAILY
COMMUNITY
End: 2021-08-11

## 2021-08-04 RX ORDER — LIDOCAINE HYDROCHLORIDE 20 MG/ML
JELLY TOPICAL AS NEEDED
Status: DISCONTINUED | OUTPATIENT
Start: 2021-08-04 | End: 2021-08-05 | Stop reason: HOSPADM

## 2021-08-04 RX ORDER — SODIUM CHLORIDE 0.9 % (FLUSH) 0.9 %
3-10 SYRINGE (ML) INJECTION AS NEEDED
Status: DISCONTINUED | OUTPATIENT
Start: 2021-08-04 | End: 2021-08-05 | Stop reason: HOSPADM

## 2021-08-04 RX ORDER — TRAZODONE HYDROCHLORIDE 50 MG/1
50 TABLET ORAL NIGHTLY
COMMUNITY
End: 2021-01-01 | Stop reason: SDUPTHER

## 2021-08-04 RX ORDER — PROCHLORPERAZINE EDISYLATE 5 MG/ML
5 INJECTION INTRAMUSCULAR; INTRAVENOUS ONCE
Status: COMPLETED | OUTPATIENT
Start: 2021-08-04 | End: 2021-08-04

## 2021-08-04 RX ORDER — PINDOLOL 5 MG/1
5 TABLET ORAL 2 TIMES DAILY
COMMUNITY
End: 2021-08-20 | Stop reason: SDUPTHER

## 2021-08-04 RX ORDER — SODIUM CHLORIDE 0.9 % (FLUSH) 0.9 %
3 SYRINGE (ML) INJECTION EVERY 12 HOURS SCHEDULED
Status: DISCONTINUED | OUTPATIENT
Start: 2021-08-04 | End: 2021-08-05 | Stop reason: HOSPADM

## 2021-08-04 RX ORDER — LEVOTHYROXINE SODIUM 0.03 MG/1
25 TABLET ORAL
COMMUNITY
End: 2021-01-01 | Stop reason: SDUPTHER

## 2021-08-04 RX ORDER — DIPHENHYDRAMINE HYDROCHLORIDE 50 MG/ML
12.5 INJECTION INTRAMUSCULAR; INTRAVENOUS ONCE
Status: COMPLETED | OUTPATIENT
Start: 2021-08-04 | End: 2021-08-04

## 2021-08-04 RX ORDER — ONDANSETRON 4 MG/1
4 TABLET, FILM COATED ORAL EVERY 6 HOURS PRN
Status: DISCONTINUED | OUTPATIENT
Start: 2021-08-04 | End: 2021-08-05 | Stop reason: HOSPADM

## 2021-08-04 RX ORDER — ATORVASTATIN CALCIUM 20 MG/1
20 TABLET, FILM COATED ORAL DAILY
COMMUNITY
End: 2021-01-01 | Stop reason: SDUPTHER

## 2021-08-04 RX ADMIN — IOPAMIDOL 100 ML: 755 INJECTION, SOLUTION INTRAVENOUS at 18:24

## 2021-08-04 RX ADMIN — SODIUM CHLORIDE, POTASSIUM CHLORIDE, SODIUM LACTATE AND CALCIUM CHLORIDE 1000 ML: 600; 310; 30; 20 INJECTION, SOLUTION INTRAVENOUS at 15:04

## 2021-08-04 RX ADMIN — ONDANSETRON 4 MG: 2 INJECTION INTRAMUSCULAR; INTRAVENOUS at 15:04

## 2021-08-04 RX ADMIN — ONDANSETRON 4 MG: 2 INJECTION INTRAMUSCULAR; INTRAVENOUS at 22:22

## 2021-08-04 RX ADMIN — SODIUM CHLORIDE, POTASSIUM CHLORIDE, SODIUM LACTATE AND CALCIUM CHLORIDE 100 ML/HR: 600; 310; 30; 20 INJECTION, SOLUTION INTRAVENOUS at 21:36

## 2021-08-04 RX ADMIN — DIPHENHYDRAMINE HYDROCHLORIDE 12.5 MG: 50 INJECTION, SOLUTION INTRAMUSCULAR; INTRAVENOUS at 17:49

## 2021-08-04 RX ADMIN — PROCHLORPERAZINE EDISYLATE 5 MG: 5 INJECTION INTRAMUSCULAR; INTRAVENOUS at 17:50

## 2021-08-04 NOTE — ED PROVIDER NOTES
"Subjective   87-year-old male presents with daughter at bedside for a 6-month history of increased stress.  He reports today he began vomiting food contents along with \"dry heaving\".  He denies fever sweats chills.  Denies abdominal pain.  Denies hematemesis, melena nor hematochezia.  Denies urinary symptoms.  Denies chest pain or dyspnea.  He denies known ill contact.  He said the following abdominal surgeries: Rectal cancer with colostomy.  He does report that he has completed both Moderna vaccines.    1. Location: Generalized  2. Quality: Nausea  3. Severity: Moderate  4. Worsening factors: Vomiting  5. Alleviating factors: Denies  6. Onset: Today  7. Radiation: Denies  8. Frequency: Constant with periods of intensity  9. 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 Source: Patient and daughter            Review of Systems   Constitutional: Negative for appetite change, chills, diaphoresis and fever.   Respiratory: Negative for shortness of breath.    Cardiovascular: Negative for chest " "pain.   Gastrointestinal: Positive for nausea and vomiting. Negative for abdominal pain, anal bleeding, blood in stool, constipation and diarrhea.   Genitourinary: Negative for decreased urine volume, difficulty urinating, flank pain and hematuria.   Skin: Negative for color change, pallor and rash.   Hematological: Negative for adenopathy.   All other systems reviewed and are negative.      Past Medical History:   Diagnosis Date   • Anxiety    • Benign bladder tumor    • Benign prostatic hyperplasia    • Colostomy in place (CMS/[facility])    • Dermatochalasis of both upper eyelids    • GERD (gastroesophageal reflux disease)    • History of seizure     ?? PT STATESHE HAS \"EPISODES\", LOSS OF CONSCIOUSNESS AT TIMES - ON MEIDCATION, SEES NEURO IN DEVYN, NO \"EPISODES\" SINCE LAT 2018   • History of subdural hematoma    • Hyperlipidemia    • Hypothyroidism    • Lentigo maligna (CMS/[facility])    • Nausea and vomiting 03/09/2021   • Overweight (BMI 25.0-29.9)    • Prediabetes    • Rectal cancer (CMS/[facility])     h/o resection, colostomy, and radiation   • Senile ectropion of both lower eyelids    • Sleep apnea     DOES NOT WEAR CPAP   • Small bowel obstruction (CMS/HCC) 03/09/2021   • Vasovagal syncope    • Venous stasis dermatitis        No Known Allergies    Past Surgical History:   Procedure Laterality Date   • BLEPHAROPLASTY Left 6/6/2019    Procedure: LEFT UPPER LID BLEPHAROPLASTY;  Surgeon: Luis Edwards MD;  Location: [facility] OR [facility] [place];  Service: Ophthalmology   • RO HOLE FOR SUBDURAL HEMATOMA  2016   • CATARACT EXTRACTION, BILATERAL     • COLON RESECTION WITH COLOSTOMY  2004    D/T RECTAL CANCER    • COLONOSCOPY     • ECTROPION REPAIR Bilateral 6/6/2019    Procedure: BILATERAL LOWER LID ECTROPION REPAIR WITH MEDIAL SPENDEL;  Surgeon: Luis Edwards MD;  Location: [facility] OR [facility] [place];  Service: Ophthalmology   • ENDOSCOPY     • HEMORRHOIDECTOMY     • HERNIA REPAIR     • PROSTATE BIOPSY         Family History   Problem " Relation Age of Onset   • Heart failure Mother    • Heart attack Father    • Dementia Sister    • Malig Hyperthermia Neg Hx        Social History     Socioeconomic History   • Marital status:      Spouse name: Not on file   • Number of children: Not on file   • Years of education: Not on file   • Highest education level: Not on file   Tobacco Use   • Smoking status: Never Smoker   • Smokeless tobacco: Never Used   Vaping Use   • Vaping Use: Never used   Substance and Sexual Activity   • Alcohol use: No   • Drug use: No   • Sexual activity: Defer           Objective   Physical Exam  Vitals and nursing note reviewed.   Constitutional:       General: He is awake. He is not in acute distress.     Appearance: Normal appearance. He is well-developed and normal weight. He is not ill-appearing, toxic-appearing or diaphoretic.   HENT:      Mouth/Throat:      Mouth: Mucous membranes are moist.      Pharynx: Oropharynx is clear.   Eyes:      General: No scleral icterus.  Cardiovascular:      Rate and Rhythm: Normal rate and regular rhythm.      Pulses: Normal pulses.           Radial pulses are 2+ on the right side and 2+ on the left side.        Dorsalis pedis pulses are 2+ on the right side and 2+ on the left side.        Posterior tibial pulses are 2+ on the right side and 2+ on the left side.      Heart sounds: Normal heart sounds, S1 normal and S2 normal. Heart sounds not distant. No murmur heard.   No friction rub. No gallop.    Pulmonary:      Effort: Pulmonary effort is normal.      Breath sounds: Normal breath sounds and air entry.   Abdominal:      General: Abdomen is flat. Bowel sounds are normal. There is no distension.      Palpations: Abdomen is soft. There is no mass.      Tenderness: There is no abdominal tenderness. There is no right CVA tenderness, left CVA tenderness, guarding or rebound.      Hernia: No hernia is present.      Comments: Colostomy noted in left lower quadrant   Skin:     General: Skin  is warm and dry.      Capillary Refill: Capillary refill takes less than 2 seconds.      Coloration: Skin is not jaundiced or pale.      Findings: No bruising or rash.   Neurological:      Mental Status: He is alert and oriented to person, place, and time.   Psychiatric:         Mood and Affect: Mood normal.         Behavior: Behavior normal. Behavior is cooperative.         Thought Content: Thought content normal.         Judgment: Judgment normal.         Procedures           ED Course  ED Course as of Aug 04 2031   Wed Aug 04, 2021   1746 Patient was initially hesitant to have CT done.  After speaking with the patient, he is now agreeable.  He was given Compazine and Benadryl, as the Zofran was not working    [AL]   1812 Awaiting CT results    [AL]   1853 Awaiting CT results.    [AL]      ED Course User Index  [AL] Thao Garrett, DIDIER    CT Abdomen Pelvis With Contrast    Result Date: 8/4/2021  1. Small bowel obstruction with transition point within the posterior pelvis in the presacral space likely related to underlying adhesive disease. 2. Postsurgical changes of prior rectal resection with diverting left lower quadrant colostomy. 3. Cholelithiasis without CT findings of acute cholecystitis. 4. Dilation of the extra hepatic bile duct up to 11 mm. This could relate to age-related dilation. Correlate clinically for abnormal liver function tests or elevated bilirubin.    Electronically Signed By-Jesse Nguyen MD On:8/4/2021 6:57 PM This report was finalized on [id number] by  Jesse Nguyen MD.    XR Chest AP    Result Date: 8/4/2021  No acute process.  Electronically Signed By-Devon Garcia MD On:8/4/2021 3:08 PM This report was finalized on [id number] by  Devon Garcia MD.    Medications   sodium chloride 0.9 % flush 10 mL (has no administration in time range)   phenylephrine (TRAV-SYNEPHRINE) 0.5 % nasal spray 2 spray (2 sprays Each Nare Not Given 8/4/21 2030)   Lidocaine HCl Urethral/Mucosal/Topical  2% (XYLOCAINE) gel (has no administration in time range)   butamben-tetracaine-benzocaine (CETACAINE) 2-2-14 % spray ( Topical Not Given 8/4/21 2030)   lactated ringers bolus 1,000 mL (0 mL Intravenous Stopped 8/4/21 1931)   ondansetron (ZOFRAN) injection 4 mg (4 mg Intravenous Given 8/4/21 1504)   prochlorperazine (COMPAZINE) injection 5 mg (5 mg Intravenous Given 8/4/21 1750)   diphenhydrAMINE (BENADRYL) injection 12.5 mg (12.5 mg Intravenous Given 8/4/21 1749)   iopamidol (ISOVUE-370) 76 % injection 100 mL (100 mL Intravenous Given 8/4/21 1824)     Labs Reviewed   COMPREHENSIVE METABOLIC PANEL - Abnormal; Notable for the following components:       Result Value    Glucose 165 (*)     All other components within normal limits    Narrative:     GFR Normal >60  Chronic Kidney Disease <60  Kidney Failure <15     CBC WITH AUTO DIFFERENTIAL - Abnormal; Notable for the following components:    Neutrophil % 85.4 (*)     Lymphocyte % 7.0 (*)     Eosinophil % 0.2 (*)     Lymphocytes, Absolute 0.50 (*)     All other components within normal limits   COVID-19,CEPHEID,COR/PHAM/PAD/MARITZA IN-HOUSE,NP SWAB IN TRANSPORT MEDIA 3-4 HR TAT, RT-PCR - Normal    Narrative:     Fact sheet for providers: https://www.fda.gov/media/893011/download     Fact sheet for patients: https://www.fda.gov/media/154713/download  Fact sheet for providers: https://www.fda.gov/media/934352/download    Fact sheet for patients: https://www.fda.gov/media/461917/download    Test performed by PCR.   LIPASE - Normal   URINALYSIS W/ MICROSCOPIC IF INDICATED (NO CULTURE) - Normal    Narrative:     Urine microscopic not indicated.   CBC AND DIFFERENTIAL    Narrative:     The following orders were created for panel order CBC & Differential.  Procedure                               Abnormality         Status                     ---------                               -----------         ------                     CBC Auto Differential[[id number]]        Abnormal        "     Final result                 Please view results for these tests on the individual orders.   EXTRA TUBES    Narrative:     The following orders were created for panel order Extra Tubes.  Procedure                               Abnormality         Status                     ---------                               -----------         ------                     [facility][[id number]]                                   Final result                 Please view results for these tests on the individual orders.   [facility]                                            MDM  Number of Diagnoses or Management Options  Non-intractable vomiting with nausea, unspecified vomiting type  SBO (small bowel obstruction) (CMS/HCC)  Diagnosis management comments: Chart Review: 4/12/2021 patient was seen by primary care for well visit.  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 Small bowel " "obstruction with transition point within the posterior    pelvis in the presacral space likely related to underlying adhesive    disease.    2. Postsurgical changes of prior rectal resection with diverting left    lower quadrant colostomy.    3. Cholelithiasis without CT findings of acute cholecystitis.    4. Dilation of the extra hepatic bile duct up to 11 mm. This could    relate to age-related dilation. Correlate clinically for abnormal liver    function tests or elevated bilirubin.                   Electronically Signed By-Jesse Nguyen MD On:8/4/2021 6:57 PM    This report was finalized on [id number] by  Jesse Nguyen MD.     XR Chest AP   Final Result    No acute process.          Electronically Signed By-Devon Garcia MD On:8/4/2021 3:08 PM    This report was finalized on [id number] by  Devon Garcia MD.    Patient undressed and placed in gown for exam.  Appropriate PPE worn during patient exam. 87-year-old male presents with daughter at bedside for a 6-month history of increased stress.  He reports today he began vomiting food contents along with \"dry heaving\".  He denies fever sweats chills.  Denies abdominal pain.  Denies hematemesis, melena nor hematochezia.  Denies urinary symptoms.  Denies chest pain or dyspnea.  He denies known ill contact.  He said the following abdominal surgeries: Rectal cancer with colostomy.  He does report that he has completed both Moderna vaccines. Unable to reproduce pain on exam. IV established and labs obtained. Abdominal pain protocol initiated. Lactated Ringers 1 liter bolus and zofran 4mg IV given. Informed patient that I have ordered an NG tube, along with some medications to numb his throat and nose.  He is agreeable to this.  Spoke with surgeon on-call who is going to evaluate the patient in a.m.  Patient was made NPO.  Spoke with Dr. Browning who accepted admission on behalf of Dr. Jacques.    Disposition/Treatment: Discussed results with patient, verbalized " understanding.  Agreeable with plan of care.  Patient was stable upon admission.    EMR Dragon transcription disclaimer:  Some of this encounter note is an electronic transcription translation of spoken language to printed text. The electronic translation of spoken language may permit erroneous, or at times, nonsensical words or phrases to be inadvertently transcribed; Although I have reviewed the note for such errors some may still exist.              Amount and/or Complexity of Data Reviewed  Clinical lab tests: reviewed  Tests in the radiology section of CPT®: reviewed  Discuss the patient with other providers: (Spoke with Dr. Bowens who will see the patient in the AM. )    Patient Progress  Patient progress: stable      Final diagnoses:   SBO (small bowel obstruction) (CMS/HCC)   Non-intractable vomiting with nausea, unspecified vomiting type       ED Disposition  ED Disposition     ED Disposition Condition Comment    Decision to Admit  Level of Care: Med/Surg [1]            No follow-up provider specified.       Medication List      No changes were made to your prescriptions during this visit.          Thao Garrett, APRN  08/04/21 2031

## 2021-08-04 NOTE — ED NOTES
Pt is refusing CT. Pt educated on the reason for the test and still refuses. Rrovider notified and states she will go in and talk to him about it.      Ana Stout RN  08/04/21 [id number]

## 2021-08-04 NOTE — ED NOTES
"Pt in ER via EMS with c/o nausea that started around midnight.  Pt has no complaints at this time but states that he had some abdominal discomfort with nausea.  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  Pt denies any CP, SOA, HA, fever or VD.  Pt is A/O x4 and ambulatory.  No s/s of distress.  RR even and unlabored.  Pt in mask.  Call light within reach.       Radha Thomas RN  08/04/21 2373    "

## 2021-08-05 ENCOUNTER — READMISSION MANAGEMENT (OUTPATIENT)
Dept: CALL CENTER | Facility: HOSPITAL | Age: 86
End: 2021-08-05

## 2021-08-05 VITALS
BODY MASS INDEX: 23.62 KG/M2 | OXYGEN SATURATION: 96 % | DIASTOLIC BLOOD PRESSURE: 68 MMHG | RESPIRATION RATE: 15 BRPM | TEMPERATURE: 98 F | HEART RATE: 75 BPM | HEIGHT: 70 IN | WEIGHT: 165 LBS | SYSTOLIC BLOOD PRESSURE: 131 MMHG

## 2021-08-05 LAB
ANION GAP SERPL CALCULATED.3IONS-SCNC: 6 MMOL/L (ref 5–15)
BASOPHILS # BLD AUTO: 0 10*3/MM3 (ref 0–0.2)
BASOPHILS NFR BLD AUTO: 0.2 % (ref 0–1.5)
BUN SERPL-MCNC: 12 MG/DL (ref 8–23)
BUN/CREAT SERPL: 13.6 (ref 7–25)
CALCIUM SPEC-SCNC: 8.3 MG/DL (ref 8.6–10.5)
CHLORIDE SERPL-SCNC: 105 MMOL/L (ref 98–107)
CO2 SERPL-SCNC: 27 MMOL/L (ref 22–29)
CREAT SERPL-MCNC: 0.88 MG/DL (ref 0.76–1.27)
DEPRECATED RDW RBC AUTO: 42.7 FL (ref 37–54)
EOSINOPHIL # BLD AUTO: 0 10*3/MM3 (ref 0–0.4)
EOSINOPHIL NFR BLD AUTO: 0.2 % (ref 0.3–6.2)
ERYTHROCYTE [DISTWIDTH] IN BLOOD BY AUTOMATED COUNT: 13 % (ref 12.3–15.4)
GFR SERPL CREATININE-BSD FRML MDRD: 82 ML/MIN/1.73
GLUCOSE SERPL-MCNC: 131 MG/DL (ref 65–99)
HCT VFR BLD AUTO: 38.8 % (ref 37.5–51)
HGB BLD-MCNC: 13.2 G/DL (ref 13–17.7)
LYMPHOCYTES # BLD AUTO: 0.6 10*3/MM3 (ref 0.7–3.1)
LYMPHOCYTES NFR BLD AUTO: 6.2 % (ref 19.6–45.3)
MCH RBC QN AUTO: 32.2 PG (ref 26.6–33)
MCHC RBC AUTO-ENTMCNC: 33.9 G/DL (ref 31.5–35.7)
MCV RBC AUTO: 95 FL (ref 79–97)
MONOCYTES # BLD AUTO: 0.7 10*3/MM3 (ref 0.1–0.9)
MONOCYTES NFR BLD AUTO: 7 % (ref 5–12)
NEUTROPHILS NFR BLD AUTO: 86.4 % (ref 42.7–76)
NEUTROPHILS NFR BLD AUTO: 9 10*3/MM3 (ref 1.7–7)
NRBC BLD AUTO-RTO: 0 /100 WBC (ref 0–0.2)
PLATELET # BLD AUTO: 167 10*3/MM3 (ref 140–450)
PMV BLD AUTO: 8.7 FL (ref 6–12)
POTASSIUM SERPL-SCNC: 4.1 MMOL/L (ref 3.5–5.2)
RBC # BLD AUTO: 4.09 10*6/MM3 (ref 4.14–5.8)
SODIUM SERPL-SCNC: 138 MMOL/L (ref 136–145)
WBC # BLD AUTO: 10.4 10*3/MM3 (ref 3.4–10.8)

## 2021-08-05 PROCEDURE — 36415 COLL VENOUS BLD VENIPUNCTURE: CPT | Performed by: INTERNAL MEDICINE

## 2021-08-05 PROCEDURE — 85025 COMPLETE CBC W/AUTO DIFF WBC: CPT | Performed by: INTERNAL MEDICINE

## 2021-08-05 PROCEDURE — 99222 1ST HOSP IP/OBS MODERATE 55: CPT | Performed by: SURGERY

## 2021-08-05 PROCEDURE — 80048 BASIC METABOLIC PNL TOTAL CA: CPT | Performed by: INTERNAL MEDICINE

## 2021-08-05 PROCEDURE — 25010000002 HEPARIN (PORCINE) PER 1000 UNITS: Performed by: INTERNAL MEDICINE

## 2021-08-05 PROCEDURE — 99239 HOSP IP/OBS DSCHRG MGMT >30: CPT | Performed by: INTERNAL MEDICINE

## 2021-08-05 RX ADMIN — SODIUM CHLORIDE, POTASSIUM CHLORIDE, SODIUM LACTATE AND CALCIUM CHLORIDE 100 ML/HR: 600; 310; 30; 20 INJECTION, SOLUTION INTRAVENOUS at 09:33

## 2021-08-05 RX ADMIN — HEPARIN SODIUM 5000 UNITS: 5000 INJECTION, SOLUTION INTRAVENOUS; SUBCUTANEOUS at 09:33

## 2021-08-05 RX ADMIN — Medication 3 ML: at 09:17

## 2021-08-05 NOTE — PLAN OF CARE
Problem: Adult Inpatient Plan of Care  Goal: Plan of Care Review  8/5/2021 1635 by Isabelle Murillo LPN  Outcome: Ongoing, Progressing  8/5/2021 1634 by Isabelle Murillo LPN  Outcome: Ongoing, Progressing  Goal: Patient-Specific Goal (Individualized)  8/5/2021 1635 by Isabelle Murillo LPN  Outcome: Ongoing, Progressing  8/5/2021 1634 by Isabelle Murillo LPN  Outcome: Ongoing, Progressing  Goal: Absence of Hospital-Acquired Illness or Injury  8/5/2021 1635 by Isabelle Murillo LPN  Outcome: Ongoing, Progressing  8/5/2021 1634 by Isabelle Murillo LPN  Outcome: Ongoing, Progressing  Intervention: Identify and Manage Fall Risk  Description: Perform standard risk assessment with a validated tool or comprehensive approach appropriate to the patient on admission; reassess fall risk frequently, with change in status or transfer to another level of care.  Communicate fall injury risk to interprofessional healthcare team.  Determine need for increased observation, equipment and environmental modification, such as low bed and signage, as well as supportive, nonskid footwear.  Adjust safety measures to individual developmental age, stage and identified risk factors.  Reinforce the importance of safety and physical activity with patient and family.  Perform regular intentional rounding to assess need for position change, pain assessment, personal needs, including assistance with toileting.  Recent Flowsheet Documentation  Taken 8/5/2021 1530 by Isabelle Murillo LPN  Safety Promotion/Fall Prevention:   safety round/check completed   nonskid shoes/slippers when out of bed   fall prevention program maintained  Taken 8/5/2021 1300 by Isabelle Murillo LPN  Safety Promotion/Fall Prevention:   safety round/check completed   nonskid shoes/slippers when out of bed   fall prevention program maintained  Taken 8/5/2021 1100 by Isabelle Murillo LPN  Safety Promotion/Fall Prevention:   safety round/check completed   nonskid shoes/slippers  when out of bed  Taken 8/5/2021 0900 by Isabelle Murillo LPN  Safety Promotion/Fall Prevention:   safety round/check completed   fall prevention program maintained  Taken 8/5/2021 0750 by Isabelle Murillo LPN  Safety Promotion/Fall Prevention:   safety round/check completed   nonskid shoes/slippers when out of bed   fall prevention program maintained  Intervention: Prevent Skin Injury  Description: Assess skin risk on admission and at regular intervals throughout hospital stay.  Keep all areas of skin (especially folds) clean and dry.  Maintain adequate skin hydration.  Relieve and redistribute pressure and protect bony prominences; implement measures based on patient-specific risk factors.  Match turning and repositioning schedule to clinical condition.  Encourage weight shift frequently; assist with reposition if unable to complete independently.  Float heels off bed. Avoid pressure on the Achilles tendon.  Keep skin free from extended contact with medical devices.  Use aids (e.g., slide boards, mechanical lift) during transfer.  Recent Flowsheet Documentation  Taken 8/5/2021 0750 by Isabelle Murillo LPN  Body Position: semi-prone, right  Intervention: Prevent and Manage VTE (venous thromboembolism) Risk  Description: Assess for VTE risk.  Encourage/assist with early ambulation.  Initiate and maintain compression or other therapy, as indicated based on identified risk in accordance with organizational protocol/provider order.  Encourage both active and passive leg exercises while in bed, if unable to ambulate.  Recent Flowsheet Documentation  Taken 8/5/2021 0750 by Isabelle Murillo LPN  VTE Prevention/Management:   bilateral   sequential compression devices off  Goal: Optimal Comfort and Wellbeing  8/5/2021 1635 by Isabelle Murillo LPN  Outcome: Ongoing, Progressing  8/5/2021 1634 by Isabelle Murillo LPN  Outcome: Ongoing, Progressing  Goal: Readiness for Transition of Care  8/5/2021 1635 by Isabelle Murillo  LPN  Outcome: Ongoing, Progressing  8/5/2021 1634 by Isabelle Murillo LPN  Outcome: Ongoing, Progressing   Goal Outcome Evaluation:         Patient observed with eyes closed in supine position. Rise and fall of chest observed. Dressings clean dry and intact. Precautions in place ,will continue to monitor purposefully.  Hourly rounding completed this shift, no complaints or needs voiced.

## 2021-08-05 NOTE — PROGRESS NOTES
[facility] Medicine Services Daily Progress Note    Patient Name: Mark Busch Jr.  : 1933  MRN: [id number]  Primary Care Physician:  Melvin Paz MD  Date of admission: 2021      Subjective      Chief Complaint: Abdominal pain    Mark Busch Jr. is a 87 y.o. male with PMH of colon cancer s/p resection nd colostomy, HTN, Dyslipidemia, GERD, hypothyroidism, depression who presented to [facility] on 2021 complaining of abdominal pain, started today, dull, 5/10, continuous, associated with nausea, dry heaves, decreased output on colostomy bag.    ER course: CT abd pelvis positive for SBO, patient refuse NGT, will admit for IVF, close monitoring, surgery consult.     2021: Patient seen and examined.  Symptoms have improved  General surgery: Patient has successfully improved with nonsurgical management.  Low residue diet trial      Review of Systems   Constitutional: Positive for malaise/fatigue. Negative for decreased appetite, diaphoresis and fever.   HENT: Negative for congestion.    Eyes: Negative for blurred vision.   Cardiovascular: Negative for chest pain, dyspnea on exertion and irregular heartbeat.   Respiratory: Negative for cough and shortness of breath.    Skin: Negative for color change and itching.   Musculoskeletal: Negative for joint pain.   Gastrointestinal: Positive for bloating, abdominal pain, change in bowel habit and nausea. Negative for constipation and diarrhea.   Genitourinary: Negative for dysuria.   Neurological: Negative for dizziness, focal weakness and numbness.   Psychiatric/Behavioral: Negative for depression.     ROS       Objective      Vitals:   Temp:  [97.8 °F (36.6 °C)-98 °F (36.7 °C)] 98 °F (36.7 °C)  Heart Rate:  [65-78] 68  Resp:  [10-18] 15  BP: (111-151)/(51-72) 111/66      Constitutional:       Appearance: Normal appearance.   HENT:      Head: Normocephalic and atraumatic.      Mouth/Throat:      Mouth: Mucous  membranes are moist.   Eyes:      Extraocular Movements: Extraocular movements intact.   Cardiovascular:      Rate and Rhythm: Normal rate and regular rhythm.      Pulses: Normal pulses.      Heart sounds: Normal heart sounds.   Pulmonary:      Effort: Pulmonary effort is normal.      Breath sounds: Normal breath sounds.   Abdominal:      General: There is distension.      Palpations: Abdomen is soft.      Tenderness: There is abdominal tenderness. There is no guarding.   Musculoskeletal:         General: Normal range of motion.      Cervical back: Normal range of motion and neck supple.   Skin:     General: Skin is warm and dry.   Neurological:      General: No focal deficit present.      Mental Status: He is alert and oriented to person, place, and time.   Psychiatric:         Mood and Affect: Mood normal.      Physical Exam      Result Review    Result Review:  I have personally reviewed the results from the time of this admission to 8/5/2021 14:43 EDT and agree with these findings:  [x]  Laboratory  [x]  Microbiology  [x]  Radiology  []  EKG/Telemetry   []  Cardiology/Vascular   []  Pathology  []  Old records  []  Other:  Most notable findings include:         Assessment/Plan      Brief Patient Summary:  Mark Busch Jr. is a 87 y.o. male who was admitted for small bowel obstruction symptoms.  His symptoms improved with nonsurgical treatment.  General surgery evaluated patient.  Recommend small bowel follow-through and surgical evaluation if symptoms recur or persist      butamben-tetracaine-benzocaine, , Topical, Once  heparin (porcine), 5,000 Units, Subcutaneous, Q12H  phenylephrine, 2 spray, Each Nare, Once  sodium chloride, 3 mL, Intravenous, Q12H       lactated ringers, 100 mL/hr, Last Rate: 100 mL/hr (08/05/21 5955)         Active Hospital Problems:  Active Hospital Problems    Diagnosis    • SBO (small bowel obstruction) (CMS/LTAC, located within [facility])      Plan:   SBO, symptoms improved  -Low residue diet  -IVF  _Zofran  PRN  -Toradol PRN  - Surgery: Improved on nonsurgical therapy     HTN  -resume home meds when not NPO     Dyslipidemia  -resume home meds when not NPO    DVT prophylaxis:  Medical DVT prophylaxis orders are present.    CODE STATUS:    Level Of Support Discussed With: Patient  Code Status: CPR  Medical Interventions (Level of Support Prior to Arrest): Full      Disposition:  I expect patient to be discharged 8/5/2021.    This patient has been examined wearing appropriate Personal Protective Equipment and discussed with hospital infection control department. 08/05/21      Electronically signed by Isabel Salcedo MD, 08/05/21, 14:43 EDT.  [facility], [place], operated by [facility] Hospitalist Team

## 2021-08-05 NOTE — ED NOTES
Report given to [facility], requested them to come get patient. [facility] said they will attempted but they are short staffed at this time.      Antoinette Desai, LPN  08/04/21 0140

## 2021-08-05 NOTE — DISCHARGE SUMMARY
[facility] Medicine Services  DISCHARGE SUMMARY    Patient Name: Mark Busch Jr.  : 1933  MRN: [id number]    Date of Admission: 2021  Date of Discharge: 2021  Primary Care Physician: Melvin Paz MD      Presenting Problem:   SBO (small bowel obstruction) (CMS/HCC) [K56.609]  Non-intractable vomiting with nausea, unspecified vomiting type [R11.2]    Active and Resolved Hospital Problems:  Active Hospital Problems    Diagnosis POA   • SBO (small bowel obstruction) (CMS/HCC) [K56.609] Yes      Resolved Hospital Problems   No resolved problems to display.         Hospital Course     Hospital Course:  Mark Busch Jr. is a 87 y.o. male with PMH of colon cancer s/p resection nd colostomy, HTN, Dyslipidemia, GERD, hypothyroidism, depression who presented to [facility] on 2021 complaining of abdominal pain, started today, dull, 5/10, continuous, associated with nausea, dry heaves, decreased output on colostomy bag.     ER course: CT abd pelvis positive for SBO, patient refuse NGT, will admit for IVF, close monitoring, surgery consult.     2021: Patient seen and examined.  Symptoms have improved  General surgery: Patient has successfully improved with nonsurgical management.  Low residue diet trial        Review of Systems   Constitutional: Positive for malaise/fatigue. Negative for decreased appetite, diaphoresis and fever.   HENT: Negative for congestion.    Eyes: Negative for blurred vision.   Cardiovascular: Negative for chest pain, dyspnea on exertion and irregular heartbeat.   Respiratory: Negative for cough and shortness of breath.    Skin: Negative for color change and itching.   Musculoskeletal: Negative for joint pain.   Gastrointestinal: Positive for bloating, abdominal pain, change in bowel habit and nausea. Negative for constipation and diarrhea.   Genitourinary: Negative for dysuria.   Neurological: Negative for dizziness, focal  weakness and numbness.   Psychiatric/Behavioral: Negative for depression.       DISCHARGE Follow Up Recommendations for labs and diagnostics:   Evaluate for small bowel follow-through and surgical evaluation if symptoms persist.    Reasons For Change In Medications and Indications for New Medications:      Day of Discharge     Vital Signs:  Temp:  [97.8 °F (36.6 °C)-98 °F (36.7 °C)] 98 °F (36.7 °C)  Heart Rate:  [65-78] 68  Resp:  [10-18] 15  BP: (111-151)/(51-72) 111/66    Physical Exam:  Constitutional:       Appearance: Normal appearance.   HENT:      Head: Normocephalic and atraumatic.      Mouth/Throat:      Mouth: Mucous membranes are moist.   Eyes:      Extraocular Movements: Extraocular movements intact.   Cardiovascular:      Rate and Rhythm: Normal rate and regular rhythm.      Pulses: Normal pulses.      Heart sounds: Normal heart sounds.   Pulmonary:      Effort: Pulmonary effort is normal.      Breath sounds: Normal breath sounds.   Abdominal:      General: There is distension.      Palpations: Abdomen is soft.      Tenderness: There is abdominal tenderness. There is no guarding.   Musculoskeletal:         General: Normal range of motion.      Cervical back: Normal range of motion and neck supple.   Skin:     General: Skin is warm and dry.   Neurological:      General: No focal deficit present.      Mental Status: He is alert and oriented to person, place, and time.   Psychiatric:         Mood and Affect: Mood normal.   Physical Exam       Pertinent  and/or Most Recent Results     LAB RESULTS:      Lab 08/05/21  0824 08/04/21  1456   WBC 10.40 7.50   HEMOGLOBIN 13.2 14.7   HEMATOCRIT 38.8 43.7   PLATELETS 167 180   NEUTROS ABS 9.00* 6.40   LYMPHS ABS 0.60* 0.50*   MONOS ABS 0.70 0.50   EOS ABS 0.00 0.00   MCV 95.0 93.6         Lab 08/05/21  0824 08/04/21  1456   SODIUM 138 137   POTASSIUM 4.1 4.3   CHLORIDE 105 101   CO2 27.0 27.0   ANION GAP 6.0 9.0   BUN 12 13   CREATININE 0.88 0.88   GLUCOSE 131*  165*   CALCIUM 8.3* 9.2         Lab 08/04/21  1456   TOTAL PROTEIN 6.7   ALBUMIN 4.40   GLOBULIN 2.3   ALT (SGPT) 13   AST (SGOT) 16   BILIRUBIN 0.5   ALK PHOS 103   LIPASE 16                     Brief Urine Lab Results  (Last result in the past 365 days)      Color   Clarity   Blood   Leuk Est   Nitrite   Protein   CREAT   Urine HCG        08/04/21 1520 Yellow Clear Negative Negative Negative Negative             Microbiology Results (last 10 days)     Procedure Component Value - Date/Time    COVID-19,CEPHEID,COR/PHAM/PAD/MARITZA IN-HOUSE(OR EMERGENT/ADD-ON),NP SWAB IN TRANSPORT MEDIA 3-4 HR TAT, RT-PCR - Swab, Nasopharynx [[id number]]  (Normal) Collected: 08/04/21 1455    Lab Status: Final result Specimen: Swab from Nasopharynx Updated: 08/04/21 1526     COVID19 Not Detected    Narrative:      Fact sheet for providers: https://www.fda.gov/media/027520/download     Fact sheet for patients: https://www.fda.gov/media/433889/download  Fact sheet for providers: https://www.fda.gov/media/142685/download    Fact sheet for patients: https://www.fda.gov/media/708854/download    Test performed by PCR.          CT Abdomen Pelvis With Contrast    Result Date: 8/4/2021  Impression: 1. Small bowel obstruction with transition point within the posterior pelvis in the presacral space likely related to underlying adhesive disease. 2. Postsurgical changes of prior rectal resection with diverting left lower quadrant colostomy. 3. Cholelithiasis without CT findings of acute cholecystitis. 4. Dilation of the extra hepatic bile duct up to 11 mm. This could relate to age-related dilation. Correlate clinically for abnormal liver function tests or elevated bilirubin.    Electronically Signed By-Jesse Nguyen MD On:8/4/2021 6:57 PM This report was finalized on [id number] by  Jesse Nguyen MD.    XR Chest AP    Result Date: 8/4/2021  Impression: No acute process.  Electronically Signed By-Devon Garcia MD On:8/4/2021 3:08 PM This report  was finalized on [id number] by  Devon Garcia MD.                  Labs Pending at Discharge:      Procedures Performed           Consults:   Consults     Date and Time Order Name Status Description    8/4/2021  9:13 PM Inpatient General Surgery Consult Completed     8/4/2021  7:49 PM Hospitalist (on-call MD unless specified) Completed     8/4/2021  7:40 PM Surgery (on-call MD unless specified) Completed             Discharge Details        Discharge Medications      ASK your doctor about these medications      Instructions Start Date   atorvastatin 20 MG tablet  Commonly known as: LIPITOR  Ask about: Which instructions should I use?   20 mg, Oral, Daily      cetirizine 10 MG tablet  Commonly known as: zyrTEC   10 mg, Oral, Daily      coenzyme Q10 100 MG capsule   100 mg, Oral, Daily      diphenhydrAMINE 25 mg capsule  Commonly known as: BENADRYL   25 mg, Oral, Nightly      levothyroxine 25 MCG tablet  Commonly known as: SYNTHROID, LEVOTHROID  Ask about: Which instructions should I use?   25 mcg, Oral, Every Early Morning      ondansetron 8 MG tablet  Commonly known as: ZOFRAN   8 mg, Oral, Every 8 Hours PRN      OXcarbazepine 300 MG tablet  Commonly known as: TRILEPTAL  Ask about: Which instructions should I use?   300 mg, Oral, 2 Times Daily      pantoprazole 40 MG EC tablet  Commonly known as: PROTONIX  Ask about: Which instructions should I use?   40 mg, Oral, Daily      pindolol 5 MG tablet  Commonly known as: VISKEN  Ask about: Which instructions should I use?   5 mg, Oral, 2 Times Daily      sertraline 50 MG tablet  Commonly known as: ZOLOFT  Ask about: Which instructions should I use?   75 mg, Oral, Daily      traZODone 50 MG tablet  Commonly known as: DESYREL  Ask about: Which instructions should I use?   50 mg, Oral, Nightly             No Known Allergies      Discharge Disposition: Home      Diet: Low residue  Hospital:  Diet Order   Procedures   • Diet Gastrointestinal; Low Residue         Discharge  Activity: As tolerated  Condition on discharge: Fair        CODE STATUS:  Code Status and Medical Interventions:   Ordered at: 08/04/21 2113     Level Of Support Discussed With:    Patient     Code Status:    CPR     Medical Interventions (Level of Support Prior to Arrest):    Full     PCP    Future Appointments   Date Time Provider Department Center   8/11/2021  9:45 AM Melvin Paz MD [facility]   10/4/2021  2:15 PM WILLAM Paz MD [facility]           Time spent on Discharge including face to face service: 61 minutes    This patient has been examined wearing appropriate Personal Protective Equipment and discussed with hospital infection control department. 08/05/21      Signature: Isabel Salcedo MD

## 2021-08-05 NOTE — ED NOTES
RN's entered room to insert NG tube.  Pt states he has spoken to his MD and states he is refusing NG tube at this time.  Provider notified.      Radha Thomas RN  08/04/21 2029

## 2021-08-05 NOTE — H&P
"    [facility] Medicine Services      Patient Name: Mark Busch Jr.  : 1933  MRN: [id number]  Primary Care Physician:  Melvin Paz MD  Date of admission: 2021      Subjective      Chief Complaint: abdominal pain    History of Present Illness: Mark Busch Jr. is a 87 y.o. male with PMH of colon cancer s/p resection nd colostomy, HTN, Dyslipidemia, GERD, hypothyroidism, depression who presented to [facility] on 2021 complaining of abdominal pain, started today, dull, 5/10, continuous, associated with nausea, dry heaves, decreased output on colostomy bag.  ER course: CT abd pelvis positive for SBO, patient refuse NGT, will admit for IVF, close monitoring, surgery consult.      Review of Systems   Constitutional: Positive for malaise/fatigue. Negative for decreased appetite, diaphoresis and fever.   HENT: Negative for congestion.    Eyes: Negative for blurred vision.   Cardiovascular: Negative for chest pain, dyspnea on exertion and irregular heartbeat.   Respiratory: Negative for cough and shortness of breath.    Skin: Negative for color change and itching.   Musculoskeletal: Negative for joint pain.   Gastrointestinal: Positive for bloating, abdominal pain, change in bowel habit and nausea. Negative for constipation and diarrhea.   Genitourinary: Negative for dysuria.   Neurological: Negative for dizziness, focal weakness and numbness.   Psychiatric/Behavioral: Negative for depression.     Personal History     Past Medical History:   Diagnosis Date   • Anxiety    • Benign bladder tumor    • Benign prostatic hyperplasia    • Colostomy in place (CMS/[facility])    • Dermatochalasis of both upper eyelids    • GERD (gastroesophageal reflux disease)    • History of seizure     ?? PT STATESHE HAS \"EPISODES\", LOSS OF CONSCIOUSNESS AT TIMES - ON MEIDCATION, SEES NEURO IN [place], NO \"EPISODES\" SINCE LAT 2018   • History of subdural hematoma    • Hyperlipidemia    • " Hypothyroidism    • Lentigo maligna (CMS/HCC)    • Nausea and vomiting 03/09/2021   • Overweight (BMI 25.0-29.9)    • Prediabetes    • Rectal cancer (CMS/HCC)     h/o resection, colostomy, and radiation   • Senile ectropion of both lower eyelids    • Sleep apnea     DOES NOT WEAR CPAP   • Small bowel obstruction (CMS/HCC) 03/09/2021   • Vasovagal syncope    • Venous stasis dermatitis        Past Surgical History:   Procedure Laterality Date   • BLEPHAROPLASTY Left 6/6/2019    Procedure: LEFT UPPER LID BLEPHAROPLASTY;  Surgeon: Luis Edwards MD;  Location: [facility] OR [facility];  Service: Ophthalmology   • RO HOLE FOR SUBDURAL HEMATOMA  2016   • CATARACT EXTRACTION, BILATERAL     • COLON RESECTION WITH COLOSTOMY  2004    D/T RECTAL CANCER    • COLONOSCOPY     • ECTROPION REPAIR Bilateral 6/6/2019    Procedure: BILATERAL LOWER LID ECTROPION REPAIR WITH MEDIAL SPENDEL;  Surgeon: Luis Edwards MD;  Location: [facility] OR [facility];  Service: Ophthalmology   • ENDOSCOPY     • HEMORRHOIDECTOMY     • HERNIA REPAIR     • PROSTATE BIOPSY         Family History: family history includes Dementia in his sister; Heart attack in his father; Heart failure in his mother. Otherwise pertinent FHx was reviewed and not pertinent to current issue.    Social History:  reports that he has never smoked. He has never used smokeless tobacco. He reports that he does not drink alcohol and does not use drugs.    Home Medications:  Prior to Admission Medications     Prescriptions Last Dose Informant Patient Reported? Taking?    atorvastatin (LIPITOR) 20 MG tablet   Yes Yes    Take 20 mg by mouth Daily.    cetirizine (zyrTEC) 10 MG tablet  Self Yes Yes    Take 10 mg by mouth Daily.    coenzyme Q10 100 MG capsule   Yes Yes    Take 100 mg by mouth Daily.    diphenhydrAMINE (BENADRYL) 25 mg capsule   Yes Yes    Take 25 mg by mouth Every Night.    levothyroxine (SYNTHROID, LEVOTHROID) 25 MCG tablet   Yes Yes    Take 25 mcg by mouth Every  Morning.    ondansetron (ZOFRAN) 8 MG tablet   Yes Yes    Take 8 mg by mouth Every 8 (Eight) Hours As Needed for Nausea or Vomiting.    OXcarbazepine (TRILEPTAL) 300 MG tablet   Yes Yes    Take 300 mg by mouth 2 (Two) Times a Day.    pantoprazole (PROTONIX) 40 MG EC tablet   Yes Yes    Take 40 mg by mouth Daily.    pindolol (VISKEN) 5 MG tablet   Yes Yes    Take 5 mg by mouth 2 (Two) Times a Day.    sertraline (ZOLOFT) 50 MG tablet   Yes Yes    Take 75 mg by mouth Daily.    traZODone (DESYREL) 50 MG tablet   Yes Yes    Take 50 mg by mouth Every Night.            Allergies:  No Known Allergies    PMH, PSX, FH, ScHx and allergies reviwed    Objective      Vitals:   Temp:  [97.8 °F (36.6 °C)] 97.8 °F (36.6 °C)  Heart Rate:  [60-76] 76  Resp:  [10-18] 18  BP: (137-151)/(51-72) 151/56    Physical Exam  Constitutional:       Appearance: Normal appearance.   HENT:      Head: Normocephalic and atraumatic.      Mouth/Throat:      Mouth: Mucous membranes are moist.   Eyes:      Extraocular Movements: Extraocular movements intact.   Cardiovascular:      Rate and Rhythm: Normal rate and regular rhythm.      Pulses: Normal pulses.      Heart sounds: Normal heart sounds.   Pulmonary:      Effort: Pulmonary effort is normal.      Breath sounds: Normal breath sounds.   Abdominal:      General: There is distension.      Palpations: Abdomen is soft.      Tenderness: There is abdominal tenderness. There is no guarding.   Musculoskeletal:         General: Normal range of motion.      Cervical back: Normal range of motion and neck supple.   Skin:     General: Skin is warm and dry.   Neurological:      General: No focal deficit present.      Mental Status: He is alert and oriented to person, place, and time.   Psychiatric:         Mood and Affect: Mood normal.         Result Review    Result Review:  I have personally reviewed the results from the time of this admission to 8/5/2021 02:40 EDT and agree with these findings:  [x]   Laboratory  []  Microbiology  [x]  Radiology  []  EKG/Telemetry   []  Cardiology/Vascular   []  Pathology  []  Old records  []  Other:  Most notable findings include: see HPI    CT Abdomen Pelvis With Contrast    Result Date: 8/4/2021  1. Small bowel obstruction with transition point within the posterior pelvis in the presacral space likely related to underlying adhesive disease. 2. Postsurgical changes of prior rectal resection with diverting left lower quadrant colostomy. 3. Cholelithiasis without CT findings of acute cholecystitis. 4. Dilation of the extra hepatic bile duct up to 11 mm. This could relate to age-related dilation. Correlate clinically for abnormal liver function tests or elevated bilirubin.    Electronically Signed By-Jesse Nguyen MD On:8/4/2021 6:57 PM This report was finalized on [id number] by  Jesse Nguyen MD.    XR Chest AP    Result Date: 8/4/2021  No acute process.  Electronically Signed By-Devon Garcia MD On:8/4/2021 3:08 PM This report was finalized on [id number] by  Devon Garcia MD.    Scheduled Meds:butamben-tetracaine-benzocaine, , Topical, Once  heparin (porcine), 5,000 Units, Subcutaneous, Q12H  phenylephrine, 2 spray, Each Nare, Once  sodium chloride, 3 mL, Intravenous, Q12H      Continuous Infusions:lactated ringers, 100 mL/hr, Last Rate: 100 mL/hr (08/04/21 3733)      PRN Meds:.ketorolac  •  Lidocaine HCl Urethral/Mucosal/Topical 2%  •  ondansetron **OR** ondansetron  •  sodium chloride  •  sodium chloride      Assessment/Plan        Active Hospital Problems:  Active Hospital Problems    Diagnosis    • SBO (small bowel obstruction) (CMS/[facility])      Plan:   SBO  -NPO  -IVF  _Zofran PRN  -Toradol PRN  -Consult Surgery    HTN  -resume home meds when not NPO    Dyslipidemia  -resume home meds when not NPO    DVT prophylaxis:  Medical DVT prophylaxis orders are present.    CODE STATUS:    Level Of Support Discussed With: Patient  Code Status: CPR  Medical Interventions  (Level of Support Prior to Arrest): Full    Admission Status:  I believe this patient meets inpatient status.    I discussed the patient's findings and my recommendations with patient and family.    Signature:

## 2021-08-05 NOTE — PLAN OF CARE
Problem: Adult Inpatient Plan of Care  Goal: Plan of Care Review  Outcome: Ongoing, Progressing  Goal: Patient-Specific Goal (Individualized)  Outcome: Ongoing, Progressing  Goal: Absence of Hospital-Acquired Illness or Injury  Outcome: Ongoing, Progressing  Intervention: Identify and Manage Fall Risk  Description: Perform standard risk assessment with a validated tool or comprehensive approach appropriate to the patient on admission; reassess fall risk frequently, with change in status or transfer to another level of care.  Communicate fall injury risk to interprofessional healthcare team.  Determine need for increased observation, equipment and environmental modification, such as low bed and signage, as well as supportive, nonskid footwear.  Adjust safety measures to individual developmental age, stage and identified risk factors.  Reinforce the importance of safety and physical activity with patient and family.  Perform regular intentional rounding to assess need for position change, pain assessment, personal needs, including assistance with toileting.  Recent Flowsheet Documentation  Taken 8/5/2021 1530 by Isabelle Murillo LPN  Safety Promotion/Fall Prevention:   safety round/check completed   nonskid shoes/slippers when out of bed   fall prevention program maintained  Taken 8/5/2021 1300 by Isabelle Murillo LPN  Safety Promotion/Fall Prevention:   safety round/check completed   nonskid shoes/slippers when out of bed   fall prevention program maintained  Taken 8/5/2021 1100 by Isabelle Murillo LPN  Safety Promotion/Fall Prevention:   safety round/check completed   nonskid shoes/slippers when out of bed  Taken 8/5/2021 0900 by Isabelle Murillo LPN  Safety Promotion/Fall Prevention:   safety round/check completed   fall prevention program maintained  Taken 8/5/2021 0750 by Isabelle Murillo LPN  Safety Promotion/Fall Prevention:   safety round/check completed   nonskid shoes/slippers when out of bed   fall  prevention program maintained  Intervention: Prevent Skin Injury  Description: Assess skin risk on admission and at regular intervals throughout hospital stay.  Keep all areas of skin (especially folds) clean and dry.  Maintain adequate skin hydration.  Relieve and redistribute pressure and protect bony prominences; implement measures based on patient-specific risk factors.  Match turning and repositioning schedule to clinical condition.  Encourage weight shift frequently; assist with reposition if unable to complete independently.  Float heels off bed. Avoid pressure on the Achilles tendon.  Keep skin free from extended contact with medical devices.  Use aids (e.g., slide boards, mechanical lift) during transfer.  Recent Flowsheet Documentation  Taken 8/5/2021 0750 by Isabelle Murillo LPN  Body Position: semi-prone, right  Intervention: Prevent and Manage VTE (venous thromboembolism) Risk  Description: Assess for VTE risk.  Encourage/assist with early ambulation.  Initiate and maintain compression or other therapy, as indicated based on identified risk in accordance with organizational protocol/provider order.  Encourage both active and passive leg exercises while in bed, if unable to ambulate.  Recent Flowsheet Documentation  Taken 8/5/2021 0750 by Isabelle Murillo LPN  VTE Prevention/Management:   bilateral   sequential compression devices off  Goal: Optimal Comfort and Wellbeing  Outcome: Ongoing, Progressing  Goal: Readiness for Transition of Care  Outcome: Ongoing, Progressing   Goal Outcome Evaluation:         Patient observed with eyes open in sitting position in chair. Rise and fall of chest observed. Dressings clean dry and intact. Hourly rounding completed this shift, no complaints or needs voiced.  Hourly rounding completed this shift, no complaints or needs voiced.       Patient tolerated meals well anticipate discharge.

## 2021-08-05 NOTE — DISCHARGE INSTRUCTIONS
Follow up with YULIANA Bowens as needed  Gradually increase activity until pre hospital levels have been met

## 2021-08-05 NOTE — CONSULTS
Inpatient General Surgery Consult  Consult performed by: Adrian Bowens MD  Consult ordered by: Lowell Browning MD  Reason for consult: Small bowel obstruction          General Surgery Consult Note      Name: Mark Busch Jr. ADMIT: 2021   : 1933  PCP: Melvin Paz MD    MRN: [id number] LOS: 1 days   AGE/SEX: 87 y.o. male  ROOM: [address] [place] [address]      Patient Care Team:  Melvin Paz MD as PCP - General (Internal Medicine)  Maggie Swain APRN (Nurse Practitioner)  Chief Complaint   Patient presents with   • Nausea       Subjective   87-year-old man with a history of colorectal cancer status post resection and end colostomy status post radiation who presents to the hospital with nausea right lower quadrant abdominal pain.  He has a long history of intermittent bowel obstructions and says he has been admitted to the hospital likely about 40 times over the last 30 or 40 years for similar symptoms.  His most recent event says it started about 24 to 48 hours ago was associated with some weakness poor p.o. intake retching and right lower quadrant haile pain.  On arrival to hospital he was found to have no significant leukocytosis only moderate with a tender abdominal exam.  CT scan shows dilated small bowel and stomach with a transition point pelvis likely related to adhesive disease.  Overnight he says his pain is gone his nausea is gone his colostomy is functioning he would like to eat and be discharged home.    His last hospital stay I met him I believe he was back in 2021.  He had a similar event.  In the interim between these hospital stays he says his symptoms are nonexistent.  He says he tolerates regular food has regular bowel function and does not have abdominal pain.    Past Medical History:   Diagnosis Date   • Anxiety    • Benign bladder tumor    • Benign prostatic hyperplasia    • Colostomy in place (CMS/HCC)    • Dermatochalasis of both upper eyelids    • GERD  "(gastroesophageal reflux disease)    • History of seizure     ?? PT STATESHE HAS \"EPISODES\", LOSS OF CONSCIOUSNESS AT TIMES - ON MEIDCATION, SEES NEURO IN [place]YN, NO \"EPISODES\" SINCE LAT 2018   • History of subdural hematoma    • Hyperlipidemia    • Hypothyroidism    • Lentigo maligna (CMS/[facility])    • Nausea and vomiting 03/09/2021   • Overweight (BMI 25.0-29.9)    • Prediabetes    • Rectal cancer (CMS/[facility])     h/o resection, colostomy, and radiation   • Senile ectropion of both lower eyelids    • Sleep apnea     DOES NOT WEAR CPAP   • Small bowel obstruction (CMS/[facility]) 03/09/2021   • Vasovagal syncope    • Venous stasis dermatitis      Past Surgical History:   Procedure Laterality Date   • BLEPHAROPLASTY Left 6/6/2019    Procedure: LEFT UPPER LID BLEPHAROPLASTY;  Surgeon: Luis Edwards MD;  Location: [facility];  Service: Ophthalmology   • RO HOLE FOR SUBDURAL HEMATOMA  2016   • CATARACT EXTRACTION, BILATERAL     • COLON RESECTION WITH COLOSTOMY  2004    D/T RECTAL CANCER    • COLONOSCOPY     • ECTROPION REPAIR Bilateral 6/6/2019    Procedure: BILATERAL LOWER LID ECTROPION REPAIR WITH MEDIAL SPENDEL;  Surgeon: Luis Edwards MD;  Location: [facility];  Service: Ophthalmology   • ENDOSCOPY     • HEMORRHOIDECTOMY     • HERNIA REPAIR     • PROSTATE BIOPSY       Family History   Problem Relation Age of Onset   • Heart failure Mother    • Heart attack Father    • Dementia Sister    • Malig Hyperthermia Neg Hx      Social History     Tobacco Use   • Smoking status: Never Smoker   • Smokeless tobacco: Never Used   Vaping Use   • Vaping Use: Never used   Substance Use Topics   • Alcohol use: No   • Drug use: No     Medications Prior to Admission   Medication Sig Dispense Refill Last Dose   • atorvastatin (LIPITOR) 20 MG tablet Take 20 mg by mouth Daily.      • cetirizine (zyrTEC) 10 MG tablet Take 10 mg by mouth Daily.      • coenzyme Q10 100 MG capsule Take 100 mg by mouth Daily.      • " diphenhydrAMINE (BENADRYL) 25 mg capsule Take 25 mg by mouth Every Night.      • levothyroxine (SYNTHROID, LEVOTHROID) 25 MCG tablet Take 25 mcg by mouth Every Morning.      • ondansetron (ZOFRAN) 8 MG tablet Take 8 mg by mouth Every 8 (Eight) Hours As Needed for Nausea or Vomiting.      • OXcarbazepine (TRILEPTAL) 300 MG tablet Take 300 mg by mouth 2 (Two) Times a Day.      • pantoprazole (PROTONIX) 40 MG EC tablet Take 40 mg by mouth Daily.      • pindolol (VISKEN) 5 MG tablet Take 5 mg by mouth 2 (Two) Times a Day.      • sertraline (ZOLOFT) 50 MG tablet Take 75 mg by mouth Daily.      • traZODone (DESYREL) 50 MG tablet Take 50 mg by mouth Every Night.        butamben-tetracaine-benzocaine, , Topical, Once  heparin (porcine), 5,000 Units, Subcutaneous, Q12H  phenylephrine, 2 spray, Each Nare, Once  sodium chloride, 3 mL, Intravenous, Q12H      lactated ringers, 100 mL/hr, Last Rate: 100 mL/hr (08/05/21 8798)      ketorolac  •  Lidocaine HCl Urethral/Mucosal/Topical 2%  •  ondansetron **OR** ondansetron  •  sodium chloride  •  sodium chloride  Patient has no known allergies.    Review of Systems   Constitutional: Negative for chills and fever.   HENT: Negative for sore throat and trouble swallowing.    Eyes: Negative for blurred vision and double vision.   Respiratory: Negative for cough and shortness of breath.    Cardiovascular: Negative for chest pain and leg swelling.   Gastrointestinal: Positive for abdominal pain, nausea and vomiting. Negative for abdominal distention, blood in stool, constipation and diarrhea.   Genitourinary: Negative for dysuria and hematuria.   Skin: Negative for rash and bruise.   Neurological: Negative for dizziness and confusion.   Psychiatric/Behavioral: Positive for depressed mood. The patient is nervous/anxious.         Objective     Vital Signs and Labs:  Vital Signs (range)  Temp:  [97.8 °F (36.6 °C)-97.9 °F (36.6 °C)] 97.9 °F (36.6 °C)  Heart Rate:  [60-78] 74  Resp:  [10-18]  16  BP: (119-151)/(51-72) 128/69    Physical Exam:  Physical Exam    CBC    Results from last 7 days   Lab Units 08/05/21  0824 08/04/21  1456   WBC 10*3/mm3 10.40 7.50   HEMOGLOBIN g/dL 13.2 14.7   PLATELETS 10*3/mm3 167 180     BMP   Results from last 7 days   Lab Units 08/05/21  0824 08/04/21  1456   SODIUM mmol/L 138 137   POTASSIUM mmol/L 4.1 4.3   CHLORIDE mmol/L 105 101   CO2 mmol/L 27.0 27.0   BUN mg/dL 12 13   CREATININE mg/dL 0.88 0.88   GLUCOSE mg/dL 131* 165*     Radiology(recent) CT Abdomen Pelvis With Contrast    Result Date: 8/4/2021  1. Small bowel obstruction with transition point within the posterior pelvis in the presacral space likely related to underlying adhesive disease. 2. Postsurgical changes of prior rectal resection with diverting left lower quadrant colostomy. 3. Cholelithiasis without CT findings of acute cholecystitis. 4. Dilation of the extra hepatic bile duct up to 11 mm. This could relate to age-related dilation. Correlate clinically for abnormal liver function tests or elevated bilirubin.    Electronically Signed By-Jesse Nguyen MD On:8/4/2021 6:57 PM This report was finalized on [id number] by  Jesse Nguyen MD.    XR Chest AP    Result Date: 8/4/2021  No acute process.  Electronically Signed By-Devon Garcia MD On:8/4/2021 3:08 PM This report was finalized on [id number] by  Devon Garcia MD.      I reviewed the patient's new clinical results.  I reviewed the patient's new imaging results and agree with the interpretation.    Assessment/Plan       SBO (small bowel obstruction) (CMS/HCC)      87 y.o. male with a history of colorectal cancer status post resection and colostomy having adjuvant radiation with a history of multiple intermittent small bowel obstructions.  At 87 years old he certainly has decision-making capability to decide whether or not he would like to have surgery for this.  Since he has had about 4 months or so since his last flareup I think this is a  reasonable amount of time given that his hospital stays are usually only about 24 hours.  Alternatives to diet advancement and discharge would be small bowel follow-through and the potential for surgical intervention.  He does not want either of these things done at present.  We will go ahead and advance diet to low residue diet.  If he feels okay this afternoon and bowels are functioning okay with discharge home and follow-up as needed.    This note was created using Dragon Voice Recognition software.    Adrian Bowens MD  08/05/21  10:11 EDT

## 2021-08-05 NOTE — CASE MANAGEMENT/SOCIAL WORK
Discharge Planning Assessment   [facility]     Patient Name: Mark Busch Jr.  MRN: [id number]  Today's Date: 8/5/2021    Admit Date: 8/4/2021    Discharge Needs Assessment     Row Name 08/05/21 1131       Living Environment    Lives With  child(natalio), adult    Current Living Arrangements  home/apartment/condo    Primary Care Provided by  self    Provides Primary Care For  no one    Family Caregiver if Needed  child(natalio), adult    Quality of Family Relationships  supportive;involved;helpful    Able to Return to Prior Arrangements  yes       Resource/Environmental Concerns    Resource/Environmental Concerns  none    Transportation Concerns  car, none       Transition Planning    Patient/Family Anticipates Transition to  home with family    Patient/Family Anticipated Services at Transition  none    Transportation Anticipated  family or friend will provide;car, drives self       Discharge Needs Assessment    Readmission Within the Last 30 Days  no previous admission in last 30 days    Equipment Currently Used at Home  other (see comments) Patient reported he has all kinds of DME at home to use if he needs it but does not currently use anything.    Concerns to be Addressed  no discharge needs identified;denies needs/concerns at this time    Anticipated Changes Related to Illness  none    Equipment Needed After Discharge  none    Provided Post Acute Provider List?  Refused        Discharge Plan     Row Name 08/05/21 1132       Plan    Plan  DC Plan: Anticipate routine home, declined needs at this time.    Patient/Family in Agreement with Plan  yes    Plan Comments  CM met with patient and family at bedside to discuss dc planning. Patient reported he was independent with his activities, reported no trouble affording medications, PCP confirmed (Brandi). Patient declined needs for any DME or HH services at this time. DC Barriers: Per surgery, anticipate dc if able to tolerate low residue diet and if bowels are functioning.         Demographic Summary     Row Name 08/05/21 1131       General Information    Admission Type  inpatient    Reason for Consult  discharge planning    Preferred Language  English     Used During This Interaction  no       Contact Information    Permission Granted to Share Info With          Functional Status     Row Name 08/05/21 1131       Functional Status    Usual Activity Tolerance  moderate    Current Activity Tolerance  moderate       Functional Status, IADL    Medications  independent    Meal Preparation  assistive equipment and person    Housekeeping  assistive equipment and person    Laundry  assistive equipment and person    Shopping  assistive equipment and person        LACE: 8    Met with patient in room wearing PPE: mask/goggles.      Maintained distance greater than six feet and spent less than 15 minutes in the room.      Crystal Adames RN     Office Phone: [phone]  Office Cell: [phone]

## 2021-08-05 NOTE — ED NOTES
1st attempt to call report to [facility] (Rm [id number]).  Was on hold for 5 minutes.  Will call back.      Radha Thomas RN  08/04/21 5870

## 2021-08-06 ENCOUNTER — TRANSITIONAL CARE MANAGEMENT TELEPHONE ENCOUNTER (OUTPATIENT)
Dept: CALL CENTER | Facility: HOSPITAL | Age: 86
End: 2021-08-06

## 2021-08-06 NOTE — OUTREACH NOTE
Call Center TCM Note      Responses   [facility] patient discharged from?  [facility]   Does the patient have one of the following disease processes/diagnoses(primary or secondary)?  Other   TCM attempt successful?  Yes   Call start time  1325   Call end time  1334   Discharge diagnosis  SBO   Is patient permission given to speak with other caregiver?  No   Meds reviewed with patient/caregiver?  Yes   Does the patient have all medications ordered at discharge?  N/A [No new meds ordered at discharge. ]   Is the patient taking all medications as directed (includes completed medication regime)?  Yes [Patient has resumed home meds. ]   Does the patient have a primary care provider?   Yes   Does the patient have an appointment with their PCP within 7 days of discharge?  Yes   Comments regarding PCP  PCP Dr Melvin Paz. Patient has previously scheduled office visit appt in place on 8/11/21 that he wishes to keep.    Has the patient kept scheduled appointments due by today?  N/A   Has home health visited the patient within 72 hours of discharge?  N/A   Psychosocial issues?  No   Did the patient receive a copy of their discharge instructions?  Yes   Nursing interventions  Reviewed instructions with patient   What is the patient's perception of their health status since discharge?  Improving   Is the patient/caregiver able to teach back signs and symptoms related to disease process for when to call PCP?  Yes   Is the patient/caregiver able to teach back the hierarchy of who to call/visit for symptoms/problems? PCP, Specialist, Home health nurse, Urgent Care, ED, 911  Yes   If the patient is a current smoker, are they able to teach back resources for cessation?  Not a smoker   TCM call completed?  Yes   Wrap up additional comments  Patient reports that he is doing well today. Denies further questions or needs today.           Talia Feliciano RN    8/6/2021, 13:34 EDT

## 2021-08-06 NOTE — OUTREACH NOTE
Call Center TCM Note      Responses   [facility] patient discharged from?  [facility]   Does the patient have one of the following disease processes/diagnoses(primary or secondary)?  Other   TCM attempt successful?  No [Outdated PCP verbal release. ]   Unsuccessful attempts  Attempt 1          Talia Feliciano RN    8/6/2021, 11:58 EDT

## 2021-08-06 NOTE — CASE MANAGEMENT/SOCIAL WORK
Case Management Discharge Note             Selected Continued Care - Discharged on 8/5/2021 Admission date: 8/4/2021 - Discharge disposition: Home or Self Care     Final Discharge Disposition Code: 01 - home or self-care

## 2021-08-11 ENCOUNTER — LAB (OUTPATIENT)
Dept: FAMILY MEDICINE CLINIC | Facility: CLINIC | Age: 86
End: 2021-08-11

## 2021-08-11 ENCOUNTER — OFFICE VISIT (OUTPATIENT)
Dept: FAMILY MEDICINE CLINIC | Facility: CLINIC | Age: 86
End: 2021-08-11

## 2021-08-11 VITALS
TEMPERATURE: 97.7 F | DIASTOLIC BLOOD PRESSURE: 70 MMHG | SYSTOLIC BLOOD PRESSURE: 130 MMHG | BODY MASS INDEX: 23.05 KG/M2 | RESPIRATION RATE: 18 BRPM | HEART RATE: 97 BPM | WEIGHT: 161 LBS | HEIGHT: 70 IN | OXYGEN SATURATION: 97 %

## 2021-08-11 DIAGNOSIS — R26.81 GAIT INSTABILITY: ICD-10-CM

## 2021-08-11 DIAGNOSIS — R79.9 ABNORMAL FINDING OF BLOOD CHEMISTRY, UNSPECIFIED: ICD-10-CM

## 2021-08-11 DIAGNOSIS — R32 URINARY INCONTINENCE, UNSPECIFIED TYPE: ICD-10-CM

## 2021-08-11 DIAGNOSIS — K56.609 SBO (SMALL BOWEL OBSTRUCTION) (HCC): Primary | ICD-10-CM
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
## 2021-08-11 DIAGNOSIS — R93.7 ABNORMAL FINDINGS ON DIAGNOSTIC IMAGING OF OTHER PARTS OF MUSCULOSKELETAL SYSTEM: ICD-10-CM

## 2021-08-11 LAB
ANION GAP SERPL CALCULATED.3IONS-SCNC: 11.2 MMOL/L (ref 5–15)
BILIRUB UR QL STRIP: NEGATIVE
BUN SERPL-MCNC: 17 MG/DL (ref 8–23)
BUN/CREAT SERPL: 16.2 (ref 7–25)
CALCIUM SPEC-SCNC: 9.8 MG/DL (ref 8.6–10.5)
CHLORIDE SERPL-SCNC: 100 MMOL/L (ref 98–107)
CLARITY UR: CLEAR
CO2 SERPL-SCNC: 25.8 MMOL/L (ref 22–29)
COLOR UR: YELLOW
CREAT SERPL-MCNC: 1.05 MG/DL (ref 0.76–1.27)
DEPRECATED RDW RBC AUTO: 41.5 FL (ref 37–54)
ERYTHROCYTE [DISTWIDTH] IN BLOOD BY AUTOMATED COUNT: 12 % (ref 12.3–15.4)
GFR SERPL CREATININE-BSD FRML MDRD: 67 ML/MIN/1.73
GLUCOSE SERPL-MCNC: 127 MG/DL (ref 65–99)
GLUCOSE UR STRIP-MCNC: NEGATIVE MG/DL
HBA1C MFR BLD: 7.2 % (ref 3.5–5.6)
HCT VFR BLD AUTO: 41.3 % (ref 37.5–51)
HGB BLD-MCNC: 13.9 G/DL (ref 13–17.7)
HGB UR QL STRIP.AUTO: NEGATIVE
KETONES UR QL STRIP: NEGATIVE
LEUKOCYTE ESTERASE UR QL STRIP.AUTO: NEGATIVE
MCH RBC QN AUTO: 32 PG (ref 26.6–33)
MCHC RBC AUTO-ENTMCNC: 33.7 G/DL (ref 31.5–35.7)
MCV RBC AUTO: 94.9 FL (ref 79–97)
NITRITE UR QL STRIP: NEGATIVE
PH UR STRIP.AUTO: 6 [PH] (ref 5–8)
PLATELET # BLD AUTO: 217 10*3/MM3 (ref 140–450)
PMV BLD AUTO: 10.1 FL (ref 6–12)
POTASSIUM SERPL-SCNC: 4.1 MMOL/L (ref 3.5–5.2)
PROT UR QL STRIP: NEGATIVE
RBC # BLD AUTO: 4.35 10*6/MM3 (ref 4.14–5.8)
SODIUM SERPL-SCNC: 137 MMOL/L (ref 136–145)
SP GR UR STRIP: 1.02 (ref 1–1.03)
UROBILINOGEN UR QL STRIP: NORMAL
WBC # BLD AUTO: 4.19 10*3/MM3 (ref 3.4–10.8)

## 2021-08-11 PROCEDURE — 1111F DSCHRG MED/CURRENT MED MERGE: CPT | Performed by: FAMILY MEDICINE

## 2021-08-11 PROCEDURE — 80048 BASIC METABOLIC PNL TOTAL CA: CPT | Performed by: FAMILY MEDICINE

## 2021-08-11 PROCEDURE — 99496 TRANSJ CARE MGMT HIGH F2F 7D: CPT | Performed by: FAMILY MEDICINE

## 2021-08-11 PROCEDURE — 36415 COLL VENOUS BLD VENIPUNCTURE: CPT | Performed by: FAMILY MEDICINE

## 2021-08-11 PROCEDURE — 83036 HEMOGLOBIN GLYCOSYLATED A1C: CPT | Performed by: FAMILY MEDICINE

## 2021-08-11 PROCEDURE — 85027 COMPLETE CBC AUTOMATED: CPT | Performed by: FAMILY MEDICINE

## 2021-08-11 PROCEDURE — 81003 URINALYSIS AUTO W/O SCOPE: CPT | Performed by: FAMILY MEDICINE

## 2021-08-11 RX ORDER — OXYBUTYNIN CHLORIDE 5 MG/1
5 TABLET, EXTENDED RELEASE ORAL DAILY
Qty: 30 TABLET | Refills: 5 | Status: SHIPPED | OUTPATIENT
Start: 2021-08-11 | End: 2021-10-05 | Stop reason: SDUPTHER

## 2021-08-11 RX ORDER — ONDANSETRON 8 MG/1
8 TABLET, ORALLY DISINTEGRATING ORAL EVERY 8 HOURS PRN
Qty: 30 TABLET | Refills: 1 | Status: SHIPPED | OUTPATIENT
Start: 2021-08-11 | End: 2022-01-01 | Stop reason: SDUPTHER

## 2021-08-11 NOTE — PROGRESS NOTES
Transitional Care Follow Up Visit  Subjective     Mark Busch Jr. is a 88 y.o. male who presents for a transitional care management visit.    Within 48 business hours after discharge our office contacted him via telephone to coordinate his care and needs.      I reviewed and discussed the details of that call along with the discharge summary, hospital problems, inpatient lab results, inpatient diagnostic studies, and consultation reports with Mark.     Current outpatient and discharge medications have been reconciled for the patient.  Reviewed by: Melvin Paz MD      Date of TCM Phone Call 8/5/2021   [facility]   Date of Admission 8/4/2021   Date of Discharge 8/5/2021   Discharge Disposition Home or Self Care     Risk for Readmission (LACE) Score: 8 (8/5/2021  6:01 AM)    History of Present Illness   Course During Hospital Stay: Hospital records reviewed    Patient presented to [place] ED on 8/4/21 w/ complaint onf abdominal pain of 1 day duration associated w/ nausea, dry heaves, and reduced ostomy output. CT was performed in ED revealed SBO and patient was admitted for further monitoring and mgmt. Upon admission, patient refused NGT but was placed on IVF. Surgery was consulted and conservative measures were decided upon. Patient was able to advance his diet and discharged home on 8/5/21    Patient reports symptoms of abdominal pain and nausea have resolved. Ostomy output is still a bit low but he has been eating less. This is because he is trying to lose weight and avoid certain foods. He reports long history of recurrent, brief SBO following colectomy w/ colostomy and chemotherapy for colorectal cancer many years ago (2004).     Poor balance: patient reports progressive gait unsteadiness and even fall resulting in SDH. He has had 3-4 falls in the last year and subsequently limited his activity- avoiding yard work.     Urinary incontinence: patient reports having issues containing his urine. He  reports being awakened 2-3 times nightly to urinate. His main issue is needing to urinate and not making it there in time. Patient follows w/ [facility].      The following portions of the patient's history were reviewed and updated as appropriate: allergies, current medications, past family history, past medical history, past social history, past surgical history and problem list.    Review of Systems   Constitutional: Negative for fever.   Respiratory: Negative for cough and shortness of breath.    Cardiovascular: Negative for chest pain and palpitations.   Gastrointestinal: Negative for abdominal pain, blood in stool, constipation, diarrhea, nausea and vomiting.   Genitourinary: Positive for urgency.   Musculoskeletal: Positive for gait problem.       Objective   Physical Exam  Constitutional:       General: He is not in acute distress.     Appearance: He is obese.   HENT:      Head: Normocephalic and atraumatic.      Right Ear: External ear normal.      Left Ear: External ear normal.      Nose: Nose normal.   Eyes:      General: No scleral icterus.        Right eye: No discharge.         Left eye: No discharge.      Extraocular Movements: Extraocular movements intact.      Conjunctiva/sclera: Conjunctivae normal.   Cardiovascular:      Rate and Rhythm: Normal rate and regular rhythm.      Heart sounds: Normal heart sounds. No murmur heard.     Pulmonary:      Effort: Pulmonary effort is normal. No respiratory distress.      Breath sounds: Normal breath sounds. No rales.   Abdominal:      General: Bowel sounds are normal. There is no distension.      Palpations: Abdomen is soft.      Tenderness: There is no abdominal tenderness.      Comments: LLQ ostomy   Musculoskeletal:         General: No deformity. Normal range of motion.      Cervical back: Normal range of motion.   Skin:     General: Skin is warm and dry.      Findings: No rash.   Neurological:      General: No focal deficit present.      Mental  Status: He is alert. Mental status is at baseline.   Psychiatric:         Mood and Affect: Mood normal.         Behavior: Behavior normal.       Assessment/Plan   Diagnoses and all orders for this visit:    1. 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  EKG reviewed with   -     Continue ondansetron ODT (ZOFRAN-ODT) 8 MG disintegrating tablet; Place 1 tablet on the tongue Every 8 (Eight) Hours As Needed for Nausea or Vomiting.  Dispense: 30 tablet; Refill: 1    2. Gait instability: Patient reports multiple falls in the last year.  Felt to be secondary to physical debility and compounded by recurrent SBO given history of colectomy with ostomy.  -     DEXA Bone Density Axial; Future  -     Ambulatory Referral to Physical Therapy Evaluate and treat    3. Fall, subsequent encounter  -     DEXA Bone Density Axial; Future  -     Ambulatory Referral to Physical Therapy Evaluate and treat    4. Urinary incontinence, unspecified type: Patient certainly may have BPH but reports problems making it to the bathroom.  Will trial low-dose Ditropan to see if this helps his symptoms at all.  We will also obtain UA today to ensure UTI is not contributing  -     oxybutynin XL (DITROPAN-XL) 5 MG 24 hr tablet; Take 1 tablet by mouth Daily.  Dispense: 30 tablet; Refill: 5  -     CBC (No Diff)  -     Basic Metabolic Panel  -     Hemoglobin A1c  -     Urinalysis With Culture If Indicated - Urine, Clean Catch    5. Abnormal findings on diagnostic imaging of other parts of musculoskeletal system   -     DEXA Bone Density Axial; Future    6. Abnormal finding of blood chemistry, unspecified   -     Hemoglobin A1c

## 2021-08-19 ENCOUNTER — TREATMENT (OUTPATIENT)
Dept: PHYSICAL THERAPY | Facility: CLINIC | Age: 86
End: 2021-08-19

## 2021-08-19 DIAGNOSIS — R26.81 UNSTEADY GAIT: Primary | ICD-10-CM
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
## 2021-08-19 DIAGNOSIS — R26.2 DIFFICULTY WALKING: ICD-10-CM

## 2021-08-19 PROCEDURE — 97112 NEUROMUSCULAR REEDUCATION: CPT | Performed by: PHYSICAL THERAPIST

## 2021-08-19 PROCEDURE — 97162 PT EVAL MOD COMPLEX 30 MIN: CPT | Performed by: PHYSICAL THERAPIST

## 2021-08-19 NOTE — PROGRESS NOTES
Physical Therapy Initial Evaluation and Plan of Care    Patient: Mark Busch Jr.   : 1933  Diagnosis/ICD-10 Code:  Unsteady gait [R26.81]  Referring practitioner: Melvin Paz MD  Date of Initial Visit: 2021  Today's Date: 2021  Patient seen for 1 sessions           Subjective Questionnaire: ABC: 81                                                 DGI:  15/24      Subjective Evaluation    History of Present Illness  Mechanism of injury: Pt reports he has had episodes where he ends up in the ER with projectile vomiting and passing out.  He recetly had one and was in the ER with thoughts that he had a small bowel obstruction.  He is usually there overnight.  He has had several falls over the past year.  He had a subdural hematoma from one of them as he fell in the bathroom.  He fell about 2 months ago outside at home and his MD wanted him to come to PT.  He has adjusted his life by slowing down in general with all activities.  He feels like he is weak, he loses his balance when he is walking.  He wants to be able to be outside in the yard without issues with balance and falls.  He has difficulty with yard work, house chores, walking, bending, squatting, cleaning at home.    Pain  Current pain ratin    Social Support  Lives in: multiple-level home  Lives with: adult children    Patient Goals  Patient goals for therapy: improved balance, increased motion, increased strength, independence with ADLs/IADLs and return to sport/leisure activities           PRECAUTIONS: h/o seizure; falls; sleep apnea; rectal CA with ostomy    Objective          Postural Observations  Seated posture: poor  Standing posture: fair        Active Range of Motion     Additional Active Range of Motion Details  BUE: All WFL except shld flex, abd and rotations behind head and back    BLE:  Hip ext: 5 deg;  Hip abd 15 deg; knee ext 5-10 deg; right ankle DF 3 deg with pain if combined with inversion; left ankle DF 5  deg    Strength/Myotome Testing     Additional Strength Details  BUE:  shlds limited with GH musculature and pt uses UT/levator compensation             Elbow flex/ext 4-;     fair    BLE:  Hip ext 3-/3;  Hip abd right 4-    Left 3+/4-            Knee ext and flex 4;  Ankle DF left 4; right 3+/4-; ev right 4, L 4-;  Inv right 4, Left 4- with pain      General Comments     Lumbar Comments  Sit to stand:  Std chair; uses arms on knees for assist; R foot post to L well under chair; cont FHRS position throughout motion  Transfers:  sps (I) with FHRS overall and some foot shuffling  Bed mobility:  All (I)  But with inc effort with rolling R/L and into prone  Sup to and from sitting:  (I) with some initial inc effort    Gait:  (I) with no AD; dec knee and hip flex bilat; right LE with some circumduction; FHRS overall and with hip flexion     MCTSIB:  cond 1:  WNL                  cond 2:  WNL with slight inc sway                  cond 3:  30 sec with mod inc sway                  cond 4:  20 sec with significant inc sway         Assessment & Plan     Assessment  Impairments: abnormal coordination, abnormal gait, abnormal or restricted ROM, impaired balance, impaired physical strength, lacks appropriate home exercise program and safety issue  Assessment details: Pt is a 89 y/o male with a dx of unsteady gait, fall history onset at least a year ago.  He has difficulty with  yard work, house chores, walking, bending, squatting, cleaning at home.  Pt exhibits the above impairments and functional limitations and would benefit from skilled PT to address those areas and maximize function.  Functional Limitations: carrying objects, lifting, walking, pulling, pushing, standing, stooping and reaching overhead  Goals  Plan Goals: STGs:  6weeks  1.  Pt to tolerate initial HEP.  2.  Pt to tolerate advancement of HEP as indicated.  3.  Pt to report at least 25% improvement in imbalance symptoms.  4.  Pt to improve balance with  MCTSIB cond 2 without inc sway.  5.  Pt to improve ability of stepping over objects and walking with vertical head movements  to score of 2 on DGI      LTGs:  By DC  1.  Pt to be (I) with HEP.  2.  Pt to report at least 75% improvement in imbalance symptoms.  3.  Pt to improve function as indicated by improved score on ABC and DGI as compared to eval to allow better performance of daily tasks like yard work, squatting and bending over.   4.  Pt to improve balance as indicated by improved performance on MCTSIB cond 3 and 4 to allow better performance of walking for exercise and yard work.      Plan  Therapy options: will be seen for skilled physical therapy services  Planned therapy interventions: abdominal trunk stabilization, balance/weight-bearing training, body mechanics training, functional ROM exercises, gait training, home exercise program, manual therapy, motor coordination training, neuromuscular re-education, postural training, strengthening, stretching, therapeutic activities and transfer training  Frequency: 2x week  Plan details: Up to 30 visits        See flowsheet for treatment details.    History # of Personal Factors and/or Comorbidities: MODERATE (1-2)  Examination of Body System(s): # of elements: MODERATE (3)  Clinical Presentation: EVOLVING  Clinical Decision Making: MODERATE      Timed:         Manual Therapy:         mins  79332;     Therapeutic Exercise:         mins  35642;     Neuromuscular Dominga: 20       mins  73772;    Therapeutic Activity:          mins  25419;     Gait Training:           mins  32619;     Ultrasound:          mins  32317;    Ionto:                                   mins   92156  Self Care:                            mins   35776  Canalith Repos:                  mins   02172      Un-Timed:  Electrical Stimulation:         mins  44719 ( );  Traction          mins 43151  Low Eval          Mins  23090  Mod Eval  40        Mins  58960  High Eval                             Mins  78231  Re-Eval                               mins  26707        Timed Treatment: 60     mins   Total Treatment:   60     mins    PT SIGNATURE: Sima Deidre, PT   DATE TREATMENT INITIATED: 8/19/2021    Initial Certification  Certification Period: 11/17/2021  I certify that the therapy services are furnished while this patient is under my care.  The services outlined above are required by this patient, and will be reviewed every 90 days.     PHYSICIAN: Melvin Paz MD      DATE:     Please sign and return via fax to [phone].. Thank you, [facility].

## 2021-08-20 RX ORDER — PINDOLOL 5 MG/1
5 TABLET ORAL 2 TIMES DAILY
Qty: 180 TABLET | Refills: 2 | Status: SHIPPED | OUTPATIENT
Start: 2021-08-20 | End: 2022-01-01

## 2021-08-26 ENCOUNTER — HOSPITAL ENCOUNTER (OUTPATIENT)
Dept: BONE DENSITY | Facility: HOSPITAL | Age: 86
Discharge: HOME | End: 2021-08-26
Admitting: FAMILY MEDICINE

## 2021-08-26 DIAGNOSIS — R93.7 ABNORMAL FINDINGS ON DIAGNOSTIC IMAGING OF OTHER PARTS OF MUSCULOSKELETAL SYSTEM: ICD-10-CM
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
## 2021-08-26 DIAGNOSIS — R26.81 GAIT INSTABILITY: ICD-10-CM

## 2021-08-26 PROCEDURE — 77080 DXA BONE DENSITY AXIAL: CPT

## 2021-09-09 RX ORDER — OXCARBAZEPINE 300 MG/1
TABLET, FILM COATED ORAL
Qty: 180 TABLET | Refills: 0 | Status: SHIPPED | OUTPATIENT
Start: 2021-09-09 | End: 2021-01-01

## 2021-09-13 ENCOUNTER — TREATMENT (OUTPATIENT)
Dept: PHYSICAL THERAPY | Facility: CLINIC | Age: 86
End: 2021-09-13
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
## 2021-09-13 DIAGNOSIS — R26.2 DIFFICULTY WALKING: ICD-10-CM

## 2021-09-13 DIAGNOSIS — R26.81 UNSTEADY GAIT: Primary | ICD-10-CM

## 2021-09-13 PROCEDURE — 97112 NEUROMUSCULAR REEDUCATION: CPT | Performed by: PHYSICAL THERAPIST

## 2021-09-13 PROCEDURE — 97110 THERAPEUTIC EXERCISES: CPT | Performed by: PHYSICAL THERAPIST

## 2021-09-13 NOTE — PROGRESS NOTES
Physical Therapy Daily Progress Note    Patient: Mark Busch Jr.   : 1933  Referring practitioner: Melvin Paz MD  Today's Date: 2021    VISIT#: 2    Subjective Evaluation    History of Present Illness  Mechanism of injury: Pt has been doing normal stuff since his eval here.  He cancelled a couple of ex due to the time of day he had them scheduled.      Pain  Current pain ratin         Objective     See Exercise, Manual, and Modality Logs for complete treatment.     Initiated HEP today with other ex performed but not added to HEP yet.  Pt needed many cues for correct tech on exercises     Patient Education:  instr in HEP with written instr provided    Assessment & Plan     Assessment  Assessment details: gideon well today with several ex issued today      Progress per Plan of Care        Timed:         Manual Therapy:         mins  42468;     Therapeutic Exercise:  20       mins  32567;     Neuromuscular Dominga:  10      mins  04199;    Therapeutic Activity:          mins  04067;     Gait Training:           mins  13612;     Ultrasound:          mins  65289;    Ionto                                   mins   47293  Self Care                            mins   65635  Canalith Repos                   mins  4209    Un-Timed:  Electrical Stimulation:         mins  24084 ( );  Traction          mins 78408  Low Eval          Mins  19388  Mod Eval          Mins  74123  High Eval                            Mins  24643  Re-Eval                               mins  50871    Timed Treatment: 30     mins   Total Treatment:   30     mins    Sima Jiang PT  Physical Therapist

## 2021-09-16 ENCOUNTER — TREATMENT (OUTPATIENT)
Dept: PHYSICAL THERAPY | Facility: CLINIC | Age: 86
End: 2021-09-16
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
## 2021-09-16 DIAGNOSIS — R26.81 UNSTEADY GAIT: Primary | ICD-10-CM

## 2021-09-16 DIAGNOSIS — R26.2 DIFFICULTY WALKING: ICD-10-CM

## 2021-09-16 PROCEDURE — 97110 THERAPEUTIC EXERCISES: CPT | Performed by: PHYSICAL THERAPIST

## 2021-09-16 PROCEDURE — 97530 THERAPEUTIC ACTIVITIES: CPT | Performed by: PHYSICAL THERAPIST

## 2021-09-16 NOTE — PROGRESS NOTES
Physical Therapy Daily Progress Note    Patient: Mark Busch Jr.   : 1933  Referring practitioner: Melvin Paz MD  Today's Date: 2021    VISIT#: 3    Subjective Evaluation    History of Present Illness  Mechanism of injury: Pt feels good about the ex he is doing so far; no difficulty with any.  He thinks he might be more steady than before.    Pain  Current pain ratin         Objective     See Exercise, Manual, and Modality Logs for complete treatment.     Cont with ex and NMR as noted, progressing as able  Cues required for tech on several  He acknowleged he did not work on the ex at home since the last visit     Patient Education:  Do HEP twice per day    Assessment & Plan     Assessment  Assessment details: gideon well with all ex/act today      Progress per Plan of Care        Timed:         Manual Therapy:         mins  31742;     Therapeutic Exercise:  20       mins  27429;     Neuromuscular Dominga:        mins  54724;    Therapeutic Activity:  10        mins  95410;     Gait Training:           mins  19610;     Ultrasound:          mins  62546;    Ionto                                   mins   42451  Self Care                            mins   18885  Canalith Repos                   mins  4209    Un-Timed:  Electrical Stimulation:         mins  52141 ( );  Traction          mins 12018  Low Eval          Mins  95688  Mod Eval          Mins  52573  High Eval                            Mins  05244  Re-Eval                               mins  39504    Timed Treatment: 30     mins   Total Treatment:   30     mins    Sima Jiang PT  Physical Therapist

## 2021-09-20 ENCOUNTER — TREATMENT (OUTPATIENT)
Dept: PHYSICAL THERAPY | Facility: CLINIC | Age: 86
End: 2021-09-20

## 2021-09-20 DIAGNOSIS — R26.81 UNSTEADY GAIT: Primary | ICD-10-CM
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
## 2021-09-20 DIAGNOSIS — R26.2 DIFFICULTY WALKING: ICD-10-CM

## 2021-09-20 PROCEDURE — 97112 NEUROMUSCULAR REEDUCATION: CPT | Performed by: PHYSICAL THERAPIST

## 2021-09-20 PROCEDURE — 97110 THERAPEUTIC EXERCISES: CPT | Performed by: PHYSICAL THERAPIST

## 2021-09-20 NOTE — PROGRESS NOTES
Physical Therapy Daily Progress Note    Patient: Mark Busch Jr.   : 1933  Referring practitioner: Melvin Paz MD  Today's Date: 2021    VISIT#: 4    Subjective Evaluation    History of Present Illness  Mechanism of injury: Pt feels like he is getting better overall.  Can get up better    Pain  Current pain ratin         Objective     See Exercise, Manual, and Modality Logs for complete treatment.     Cont with NMR and ex as noted;  Progressed as able    Patient Education:    Assessment & Plan     Assessment  Assessment details: gideon well today with all ex and the new ones added      Progress per Plan of Care        Timed:         Manual Therapy:         mins  29634;     Therapeutic Exercise: 10        mins  40260;     Neuromuscular Dominga: 20       mins  49453;    Therapeutic Activity:          mins  92680;     Gait Training:           mins  02013;     Ultrasound:          mins  88953;    Ionto                                   mins   15048  Self Care                            mins   55930  Canalith Repos                   mins  4209    Un-Timed:  Electrical Stimulation:         mins  59497 (MC );  Traction          mins 83749  Low Eval          Mins  88420  Mod Eval          Mins  29617  High Eval                            Mins  78853  Re-Eval                               mins  08329    Timed Treatment: 30     mins   Total Treatment:   30     mins    Sima Jiang PT  Physical Therapist

## 2021-09-23 ENCOUNTER — TREATMENT (OUTPATIENT)
Dept: PHYSICAL THERAPY | Facility: CLINIC | Age: 86
End: 2021-09-23

## 2021-09-23 DIAGNOSIS — R26.81 UNSTEADY GAIT: Primary | ICD-10-CM

## 2021-09-23 DIAGNOSIS — R26.2 DIFFICULTY WALKING: ICD-10-CM
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
## 2021-09-23 PROCEDURE — 97112 NEUROMUSCULAR REEDUCATION: CPT | Performed by: PHYSICAL THERAPIST

## 2021-09-23 PROCEDURE — 97110 THERAPEUTIC EXERCISES: CPT | Performed by: PHYSICAL THERAPIST

## 2021-09-23 NOTE — PROGRESS NOTES
Physical Therapy Daily Progress Note    Patient: Mark Busch Jr.   : 1933  Referring practitioner: Melvin Paz MD  Today's Date: 2021    VISIT#: 5    Subjective Evaluation    History of Present Illness  Mechanism of injury: Pt reports things are going well.  He has been doing OK but did not do any exercises since he was here last.  He was busy with some things at home and had people coming and going,      Pain  Current pain ratin         Objective     See Exercise, Manual, and Modality Logs for complete treatment.     Cont with ex, NMR as noted; progressed today as noted as pt was able    Patient Education:  Use the longer band for the ankle exercises    Assessment & Plan     Assessment  Assessment details: Pt gideon well today with all ex and NMR he performed.  He did well on foam today      Progress per Plan of Care        Timed:         Manual Therapy:         mins  24533;     Therapeutic Exercise:  15       mins  59990;     Neuromuscular Dominga: 15       mins  46988;    Therapeutic Activity:          mins  03353;     Gait Training:           mins  01933;     Ultrasound:          mins  42539;    Ionto                                   mins   88940  Self Care                            mins   76304  Canalith Repos                   mins  4209    Un-Timed:  Electrical Stimulation:         mins  92882 ( );  Traction          mins 07500  Low Eval          Mins  74844  Mod Eval          Mins  06486  High Eval                            Mins  71465  Re-Eval                               mins  60298    Timed Treatment: 30     mins   Total Treatment:   30     mins    Sima Jiang PT  Physical Therapist

## 2021-09-27 ENCOUNTER — TREATMENT (OUTPATIENT)
Dept: PHYSICAL THERAPY | Facility: CLINIC | Age: 86
End: 2021-09-27

## 2021-09-27 DIAGNOSIS — R26.2 DIFFICULTY WALKING: ICD-10-CM

## 2021-09-27 DIAGNOSIS — R26.81 UNSTEADY GAIT: Primary | ICD-10-CM
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
## 2021-09-27 PROCEDURE — 97112 NEUROMUSCULAR REEDUCATION: CPT | Performed by: PHYSICAL THERAPIST

## 2021-09-27 PROCEDURE — 97110 THERAPEUTIC EXERCISES: CPT | Performed by: PHYSICAL THERAPIST

## 2021-09-27 NOTE — PROGRESS NOTES
Physical Therapy Daily Progress Note    Patient: Mark Busch Jr.   : 1933  Referring practitioner: Melvin Paz MD  Today's Date: 2021    VISIT#: 6    Subjective Evaluation    History of Present Illness  Mechanism of injury: Pt reports he is doing OK.      Pain  Current pain ratin         Objective     See Exercise, Manual, and Modality Logs for complete treatment.     Cont with ex and NMR as noted;    Pt has a tendency to keep his right foot behind the left with sit to stands; cues to correct but pt still pulls it back without realizing it     Assessment & Plan     Assessment  Assessment details: gideon well today with progressions and new ex      Progress per Plan of Care        Timed:         Manual Therapy:         mins  34495;     Therapeutic Exercise:   18      mins  34440;     Neuromuscular Dominga:  12      mins  17935;    Therapeutic Activity:          mins  63314;     Gait Training:           mins  73160;     Ultrasound:          mins  05545;    Ionto                                   mins   41729  Self Care                            mins   48178  Canalith Repos                   mins  4209    Un-Timed:  Electrical Stimulation:         mins  13638 ( );  Traction          mins 38382  Low Eval          Mins  02208  Mod Eval          Mins  78714  High Eval                            Mins  41579  Re-Eval                               mins  36441    Timed Treatment: 30     mins   Total Treatment:   30     mins    Sima Jiang PT  Physical Therapist

## 2021-09-30 ENCOUNTER — TREATMENT (OUTPATIENT)
Dept: PHYSICAL THERAPY | Facility: CLINIC | Age: 86
End: 2021-09-30

## 2021-09-30 DIAGNOSIS — R26.81 UNSTEADY GAIT: Primary | ICD-10-CM
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
## 2021-09-30 DIAGNOSIS — R26.2 DIFFICULTY WALKING: ICD-10-CM

## 2021-09-30 PROCEDURE — 97112 NEUROMUSCULAR REEDUCATION: CPT | Performed by: PHYSICAL THERAPIST

## 2021-09-30 PROCEDURE — 97110 THERAPEUTIC EXERCISES: CPT | Performed by: PHYSICAL THERAPIST

## 2021-09-30 NOTE — PROGRESS NOTES
Physical Therapy Daily Progress Note    Patient: Mark Busch Jr.   : 1933  Referring practitioner: Melvin Paz MD  Today's Date: 2021    VISIT#: 7    Subjective Evaluation    History of Present Illness  Mechanism of injury: Pt reports he feels some improved; he has not been doing his ex program much at home but knows he should    Pain  Current pain ratin         Objective     See Exercise, Manual, and Modality Logs for complete treatment.     Cont with ex, NMR as noted; progressed with some act/ex today    Patient Education:  Be sure to do your ex at home every day    Assessment & Plan     Assessment  Assessment details: gideon well today with progressions and good tolerance to program      Progress per Plan of Care        Timed:         Manual Therapy:         mins  74847;     Therapeutic Exercise:  20       mins  99077;     Neuromuscular Dominga: 12       mins  98221;    Therapeutic Activity:          mins  02020;     Gait Training:           mins  78632;     Ultrasound:          mins  19074;    Ionto                                   mins   59804  Self Care                            mins   59595  Canalith Repos                   mins  4209    Un-Timed:  Electrical Stimulation:         mins  30244 ( );  Traction          mins 46060  Low Eval          Mins  90901  Mod Eval          Mins  26162  High Eval                            Mins  50823  Re-Eval                               mins  72265    Timed Treatment:32     mins   Total Treatment:  32      mins    Sima Jiang PT  Physical Therapist

## 2021-10-05 DIAGNOSIS — R32 URINARY INCONTINENCE, UNSPECIFIED TYPE: ICD-10-CM

## 2021-10-05 RX ORDER — OXYBUTYNIN CHLORIDE 5 MG/1
5 TABLET, EXTENDED RELEASE ORAL DAILY
Qty: 90 TABLET | Refills: 2 | Status: SHIPPED | OUTPATIENT
Start: 2021-10-05 | End: 2022-01-01

## 2021-10-06 ENCOUNTER — TREATMENT (OUTPATIENT)
Dept: PHYSICAL THERAPY | Facility: CLINIC | Age: 86
End: 2021-10-06
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
## 2021-10-06 DIAGNOSIS — R26.81 UNSTEADY GAIT: Primary | ICD-10-CM

## 2021-10-06 DIAGNOSIS — R26.2 DIFFICULTY WALKING: ICD-10-CM

## 2021-10-06 PROCEDURE — 97112 NEUROMUSCULAR REEDUCATION: CPT | Performed by: PHYSICAL THERAPIST

## 2021-10-06 PROCEDURE — 97110 THERAPEUTIC EXERCISES: CPT | Performed by: PHYSICAL THERAPIST

## 2021-10-06 NOTE — PROGRESS NOTES
Physical Therapy Daily Progress Note    Patient: Mark Busch Jr.   : 1933  Referring practitioner: Melvin Paz MD  Today's Date: 10/6/2021    VISIT#: 8    Subjective Evaluation    History of Present Illness  Mechanism of injury: Pt has nothing of note to tell me today.  Still working on ex at home.    Pain  Current pain ratin         Objective     See Exercise, Manual, and Modality Logs for complete treatment.     Cont with NMR and ex as noted; progressing as able; pt not as steady today with EC bal activity      Assessment & Plan     Assessment  Assessment details: gideon well today except for EC foam balance was not as steady       Progress per Plan of Care        Timed:         Manual Therapy:         mins  78921;     Therapeutic Exercise:  15       mins  62540;     Neuromuscular Dominga: 15       mins  26989;    Therapeutic Activity:          mins  33998;     Gait Training:           mins  10974;     Ultrasound:          mins  95721;    Ionto                                   mins   48077  Self Care                            mins   59326  Canalith Repos                   mins  4209    Un-Timed:  Electrical Stimulation:         mins  35633 ( );  Traction          mins 85201  Low Eval          Mins  49897  Mod Eval          Mins  81615  High Eval                            Mins  67380  Re-Eval                               mins  00274    Timed Treatment: 30     mins   Total Treatment:   30     mins    Sima Jiang PT  Physical Therapist

## 2021-10-07 ENCOUNTER — TREATMENT (OUTPATIENT)
Dept: PHYSICAL THERAPY | Facility: CLINIC | Age: 86
End: 2021-10-07

## 2021-10-07 DIAGNOSIS — R26.81 UNSTEADY GAIT: Primary | ICD-10-CM
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
## 2021-10-07 DIAGNOSIS — R26.2 DIFFICULTY WALKING: ICD-10-CM

## 2021-10-07 PROCEDURE — 97110 THERAPEUTIC EXERCISES: CPT | Performed by: PHYSICAL THERAPIST

## 2021-10-07 PROCEDURE — 97112 NEUROMUSCULAR REEDUCATION: CPT | Performed by: PHYSICAL THERAPIST

## 2021-10-07 NOTE — PROGRESS NOTES
Physical Therapy Daily Progress Note    Patient: Mark Busch Jr.   : 1933  Referring practitioner: Melvin Paz MD  Today's Date: 10/7/2021    VISIT#: 9    Subjective Evaluation    History of Present Illness  Mechanism of injury: Pt reports he is doing well overall.  No questions about any of his exercises.     Pain  Current pain ratin         Objective     See Exercise, Manual, and Modality Logs for complete treatment.     Cont with ex and NMR today with good performance with lifting foot one inch off the floor      Assessment & Plan     Assessment  Assessment details: gideon well with new balance act added today; pt was fearful at first but did well.          Progress per Plan of Care        Timed:         Manual Therapy:         mins  99218;     Therapeutic Exercise:  15       mins  17650;     Neuromuscular Dominga: 15       mins  85113;    Therapeutic Activity:          mins  14188;     Gait Training:           mins  64640;     Ultrasound:          mins  85610;    Ionto                                   mins   14461  Self Care                            mins   13377  Canalith Repos                   mins  4209    Un-Timed:  Electrical Stimulation:         mins  38894 ( );  Traction          mins 69290  Low Eval          Mins  49141  Mod Eval          Mins  28686  High Eval                            Mins  69319  Re-Eval                               mins  33698    Timed Treatment: 30     mins   Total Treatment:  30      mins    Sima Jiang PT  Physical Therapist

## 2021-10-21 ENCOUNTER — TREATMENT (OUTPATIENT)
Dept: PHYSICAL THERAPY | Facility: CLINIC | Age: 86
End: 2021-10-21

## 2021-10-21 DIAGNOSIS — R26.2 DIFFICULTY WALKING: ICD-10-CM
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
## 2021-10-21 DIAGNOSIS — R26.81 UNSTEADY GAIT: Primary | ICD-10-CM

## 2021-10-21 PROCEDURE — 97110 THERAPEUTIC EXERCISES: CPT | Performed by: PHYSICAL THERAPIST

## 2021-10-21 PROCEDURE — 97112 NEUROMUSCULAR REEDUCATION: CPT | Performed by: PHYSICAL THERAPIST

## 2021-10-21 NOTE — PROGRESS NOTES
Physical Therapy Daily Progress Note    Patient: Mark Busch Jr.   : 1933  Referring practitioner: Melvin Paz MD  Today's Date: 10/21/2021    VISIT#: 10    Subjective Evaluation    History of Present Illness  Mechanism of injury: Pt reports he feels about 40% improved with walking and balance since the start of PT.  He notes improved steadiness when on his feet, can walk better, stands up straighter.  He still has issues with balance while moving/walking.  He has not had any falls.  His left ankle hurts and it hurts with moving it certain ways, steps on it in certain ways and he feels like he does not get proper support with it while walking.      ABC:  65         Objective     DGI:      Active Range of Motion     BLE:  Hip ext: 8 deg;  Hip abd 15 deg; knee ext 5-10 deg; right ankle DF 3 deg with pain if combined with inversion; left ankle DF 5 deg     Strength/Myotome Testing     BLE:  Hip ext 3-/3;  Hip abd right 4-    Left 3+/4-            Knee ext and flex 4;  Ankle DF left 4; right 3+/4-; ev right 4, L 4-;  Inv right 4, Left unable to test due to pain          Lumbar Comments  Sit to stand:  Std chair; no longer uses arms on for assist; R foot post to L well under chair; cont FHRS position throughout motion but less than at eval    Transfers:  sps (I) with FHRS overall and some foot shuffling  Bed mobility:  All (I)  But with inc effort with rolling R/L and into prone  Sup to and from sitting:  (I) with some initial inc effort     Gait:  (I) with no AD; dec knee and hip flex bilat; right LE with some circumduction; FHRS overall and with hip flexion      MCTSIB:  cond 1:  WNL                  cond 2:  WNL with no inc sway now                  cond 3:  30 sec with mod inc sway                  cond 4:  20 sec with significant inc sway     See Exercise, Manual, and Modality Logs for complete treatment.     Re-assessed for 10th visit      Assessment & Plan     Assessment  Assessment  details: Pt has met 4/5 STGs and 0/4 LTGs at this time.  Pt continues to have issues with balance with walking, working in the yard, pain with left ankle that effects his walking.  Pt would benefit from continued skilled PT to address impairments, dec pain, work toward meeting remaining goals and maximize function.         STGs:  6weeks  1.  Pt to tolerate initial HEP.  MET  2.  Pt to tolerate advancement of HEP as indicated.  MET  3.  Pt to report at least 25% improvement in imbalance symptoms.  MET  4.  Pt to improve balance with MCTSIB cond 2 without inc sway.  MET  5.  Pt to improve ability of stepping over objects and walking with vertical head movements  to score of 2 on DGI      LTGs:  By DC  1.  Pt to be (I) with HEP.  2.  Pt to report at least 75% improvement in imbalance symptoms.  3.  Pt to improve function as indicated by improved score on ABC and DGI as compared to eval to allow better performance of daily tasks like yard work, squatting and bending over.   4.  Pt to improve balance as indicated by improved performance on MCTSIB cond 3 and 4 to allow better performance of walking for exercise and yard work.        Progress per Plan of Care        Timed:         Manual Therapy:         mins  15798;     Therapeutic Exercise:  10       mins  07003;     Neuromuscular Dominga: 20       mins  22594;    Therapeutic Activity:          mins  15261;     Gait Training:           mins  35522;     Ultrasound:          mins  52104;    Ionto                                   mins   31702  Self Care                            mins   73854  Canalith Repos                   mins  4209    Un-Timed:  Electrical Stimulation:         mins  80916 ( );  Traction          mins 09613  Low Eval          Mins  75238  Mod Eval          Mins  98116  High Eval                            Mins  89469  Re-Eval                               mins  19809    Timed Treatment: 30     mins   Total Treatment:   30     mins    Sima Jiang  PT  Physical Therapist

## 2021-10-25 ENCOUNTER — TELEPHONE (OUTPATIENT)
Dept: PHYSICAL THERAPY | Facility: CLINIC | Age: 86
End: 2021-10-25

## 2021-10-25 NOTE — TELEPHONE ENCOUNTER
[facility] HAS COVID - EXPOSED     HAS AN APPT WITH MD ON 11/4 - REQUESTED TO CANCEL ALL APPTS UP UNTIL THAT POINT - IF NEEDS TO CANCEL ALL REMAINING AFTER THAT, HE WILL CALL

## 2021-11-02 ENCOUNTER — OFFICE VISIT (OUTPATIENT)
Dept: CARDIOLOGY | Facility: CLINIC | Age: 86
End: 2021-11-02

## 2021-11-02 VITALS
HEIGHT: 66 IN | RESPIRATION RATE: 18 BRPM | BODY MASS INDEX: 25.91 KG/M2 | WEIGHT: 161.2 LBS | DIASTOLIC BLOOD PRESSURE: 70 MMHG | HEART RATE: 68 BPM | SYSTOLIC BLOOD PRESSURE: 126 MMHG

## 2021-11-02 DIAGNOSIS — I49.5 SICK SINUS SYNDROME (HCC): Primary | ICD-10-CM

## 2021-11-02 DIAGNOSIS — E78.2 MIXED HYPERLIPIDEMIA: ICD-10-CM

## 2021-11-02 DIAGNOSIS — I10 ESSENTIAL HYPERTENSION: ICD-10-CM

## 2021-11-02 PROCEDURE — 99203 OFFICE O/P NEW LOW 30 MIN: CPT | Performed by: INTERNAL MEDICINE

## 2021-11-03 NOTE — PROGRESS NOTES
Cardiology clinic note  Edgar Main MD, PhD  Subjective:     Encounter Date:11/02/2021      Patient ID: Mark Busch Jr. is a 88 y.o. male.    Chief Complaint:  Chief Complaint   Patient presents with   • Follow-up       HPI:  History of Present Illness  At the pleasure of seeing this 88-year-old gentleman as a new patient today.  He was previously established and was seen by cardiology for hypertension hyperlipidemia comorbidity of diabetes, history of sick sinus syndrome does not have a device in place.  Family history of MI and CHF in mother and father but at advanced age.  Medicines consist of moderate intensity statin therapy with atorvastatin 20, pindolol therapy 1 tablet twice a day at 5 mg.  He has no history of any underlying cardiomyopathy, structural heart disease, valvular heart disease or coronary disease.  He is angina free otherwise doing well and well compensated.  He has good functional status    Review of systems otherwise negative x14 point review of systems except as mentioned above    Historical data copied forward from previous encounters in EMR is unchanged    It appears he saw us for previous low heart rates and sick sinus syndrome previously and he remains completely asymptomatic heart rates in the 60s today blood pressure 126/70    I discussed with the patient he can come back to cardiology as needed or in 1 year.  Follow-up primary care in the interim  Primary prevention goals continued he is on appropriate statin therapy without aspirin.  If he has dizziness he can stop his pindolol therapy which does reduce his heart rate and he denies any syncope.  Prior EKGs did not reveal any high risk features of conduction abnormality or QT prolongation    Return to clinic in 1 year or as needed    Edgar Main, PhD  The following portions of the patient's history were reviewed and updated as appropriate: allergies, current medications, past family history, past medical history, past social  "history, past surgical history and problem list.    Problem List:  Patient Active Problem List   Diagnosis   • Benign prostatic hyperplasia   • Depression   • Generalized anxiety disorder   • Obstructive sleep apnea   • History of rectal cancer   • Sick sinus syndrome (HCC)   • Hypothyroidism   • Essential hypertension   • GERD (gastroesophageal reflux disease)   • Mixed hyperlipidemia   • Colostomy in place (HCC)   • Primary insomnia   • Type 2 diabetes mellitus without complication, without long-term current use of insulin (HCC)   • DESIREE (acute kidney injury) (HCC)   • History of small bowel obstruction   • Seizure-like activity (HCC)   • SBO (small bowel obstruction) (HCC)       Past Medical History:  Past Medical History:   Diagnosis Date   • Anxiety    • Benign bladder tumor    • Benign prostatic hyperplasia    • Colostomy in place (HCC)    • Dermatochalasis of both upper eyelids    • GERD (gastroesophageal reflux disease)    • History of seizure     ?? PT STATESHE HAS \"EPISODES\", LOSS OF CONSCIOUSNESS AT TIMES - ON MEIDCATION, SEES NEURO IN [place], NO \"EPISODES\" SINCE LAT 2018   • History of subdural hematoma    • Hyperlipidemia    • Hypothyroidism    • Lentigo maligna (HCC)    • Nausea and vomiting 03/09/2021   • Overweight (BMI 25.0-29.9)    • Prediabetes    • Rectal cancer (HCC)     h/o resection, colostomy, and radiation   • Senile ectropion of both lower eyelids    • Sleep apnea     DOES NOT WEAR CPAP   • Small bowel obstruction (HCC) 03/09/2021   • Vasovagal syncope    • Venous stasis dermatitis        Past Surgical History:  Past Surgical History:   Procedure Laterality Date   • BLEPHAROPLASTY Left 6/6/2019    Procedure: LEFT UPPER LID BLEPHAROPLASTY;  Surgeon: Luis Edwards MD;  Location: [facility] OR [facility] [place];  Service: Ophthalmology   • RO HOLE FOR SUBDURAL HEMATOMA  2016   • CATARACT EXTRACTION, BILATERAL     • COLON RESECTION WITH COLOSTOMY  2004    D/T RECTAL CANCER    • COLONOSCOPY     • " "ECTROPION REPAIR Bilateral 6/6/2019    Procedure: BILATERAL LOWER LID ECTROPION REPAIR WITH MEDIAL SPENDEL;  Surgeon: Luis Edwards MD;  [place] [facility] [place] [facility] [place]: Op[place]mology   • ENDOSCOPY     • HEMORRHOIDECTOMY     • HERNIA REPAIR     • PROSTATE BIOPSY         Social History:  Social History     Socioeconomic History   • Marital status:    Tobacco Use   • Smoking status: Never Smoker   • Smokeless tobacco: Never Used   Vaping Use   • Vaping Use: Never used   Substance and Sexual Activity   • Alcohol use: No   • Drug use: No   • Sexual activity: Defer       Allergies:  No Known Allergies    Immunizations:  Immunization History   Administered Date(s) Administered   • COVID-19 (MODERNA) 01/12/2021, 02/09/2021   • Fluad Quad 65+ 11/01/2019   • Fluzone High Dose =>65 Years ([employer] ONLY) 10/11/2015, 10/21/2016, 10/07/2017, 10/22/2018, 09/22/2020   • H1N1 Inj 12/18/2009   • Pneumococcal Conjugate 13-Valent (PCV13) 01/06/2015   • Pneumococcal Polysaccharide (PPSV23) 01/02/2007   • Tdap 09/15/2014       ROS:  ROS       Objective:         /70 (BP Location: Left arm, Patient Position: Sitting)   Pulse 68   Resp 18   Ht 167.6 cm (66\")   Wt 73.1 kg (161 lb 3.2 oz)   BMI 26.02 kg/m²     Physical Exam    In-Office Procedure(s):  Procedures    ASCVD RIsk Score::  The ASCVD Risk score (Ngoc LARISSA Jr., et al., 2013) failed to calculate for the following reasons:    The 2013 ASCVD risk score is only valid for ages 40 to 79    Recent Radiology:  Imaging Results (Most Recent)     None          Lab Review:   Office Visit on 08/11/2021   Component Date Value   • WBC 08/11/2021 4.19    • RBC 08/11/2021 4.35    • Hemoglobin 08/11/2021 13.9    • Hematocrit 08/11/2021 41.3    • MCV 08/11/2021 94.9    • MCH 08/11/2021 32.0    • MCHC 08/11/2021 33.7    • RDW 08/11/2021 12.0*   • RDW-SD 08/11/2021 41.5    • MPV 08/11/2021 10.1    • Platelets 08/11/2021 217    • Glucose 08/11/2021 127*   • BUN 08/11/2021 17  "   • Creatinine 08/11/2021 1.05    • Sodium 08/11/2021 137    • Potassium 08/11/2021 4.1    • Chloride 08/11/2021 100    • CO2 08/11/2021 25.8    • Calcium 08/11/2021 9.8    • eGFR Non  Amer 08/11/2021 67    • BUN/Creatinine Ratio 08/11/2021 16.2    • Anion Gap 08/11/2021 11.2    • Hemoglobin A1C 08/11/2021 7.2*   • Color, UA 08/11/2021 Yellow    • Appearance, UA 08/11/2021 Clear    • pH, UA 08/11/2021 6.0    • Specific Gravity, UA 08/11/2021 1.019    • Glucose, UA 08/11/2021 Negative    • Ketones, UA 08/11/2021 Negative    • Bilirubin, UA 08/11/2021 Negative    • Blood, UA 08/11/2021 Negative    • Protein, UA 08/11/2021 Negative    • Leuk Esterase, UA 08/11/2021 Negative    • Nitrite, UA 08/11/2021 Negative    • Urobilinogen, UA 08/11/2021 1.0 E.U./dL    Admission on 08/04/2021, Discharged on 08/05/2021   Component Date Value   • Glucose 08/04/2021 165*   • BUN 08/04/2021 13    • Creatinine 08/04/2021 0.88    • Sodium 08/04/2021 137    • Potassium 08/04/2021 4.3    • Chloride 08/04/2021 101    • CO2 08/04/2021 27.0    • Calcium 08/04/2021 9.2    • Total Protein 08/04/2021 6.7    • Albumin 08/04/2021 4.40    • ALT (SGPT) 08/04/2021 13    • AST (SGOT) 08/04/2021 16    • Alkaline Phosphatase 08/04/2021 103    • Total Bilirubin 08/04/2021 0.5    • eGFR Non  Amer 08/04/2021 82    • Globulin 08/04/2021 2.3    • A/G Ratio 08/04/2021 1.9    • BUN/Creatinine Ratio 08/04/2021 14.8    • Anion Gap 08/04/2021 9.0    • Lipase 08/04/2021 16    • Color, UA 08/04/2021 Yellow    • Appearance, UA 08/04/2021 Clear    • pH, UA 08/04/2021 7.0    • Specific Gravity, UA 08/04/2021 1.021    • Glucose, UA 08/04/2021 Negative    • Ketones, UA 08/04/2021 Negative    • Bilirubin, UA 08/04/2021 Negative    • Blood, UA 08/04/2021 Negative    • Protein, UA 08/04/2021 Negative    • Leuk Esterase, UA 08/04/2021 Negative    • Nitrite, UA 08/04/2021 Negative    • Urobilinogen, UA 08/04/2021 1.0 E.U./dL    • COVID19 08/04/2021 Not  Detected    • WBC 08/04/2021 7.50    • RBC 08/04/2021 4.67    • Hemoglobin 08/04/2021 14.7    • Hematocrit 08/04/2021 43.7    • MCV 08/04/2021 93.6    • MCH 08/04/2021 31.5    • MCHC 08/04/2021 33.7    • RDW 08/04/2021 12.7    • RDW-SD 08/04/2021 42.0    • MPV 08/04/2021 8.1    • Platelets 08/04/2021 180    • Neutrophil % 08/04/2021 85.4*   • Lymphocyte % 08/04/2021 7.0*   • Monocyte % 08/04/2021 7.3    • Eosinophil % 08/04/2021 0.2*   • Basophil % 08/04/2021 0.1    • Neutrophils, Absolute 08/04/2021 6.40    • Lymphocytes, Absolute 08/04/2021 0.50*   • Monocytes, Absolute 08/04/2021 0.50    • Eosinophils, Absolute 08/04/2021 0.00    • Basophils, Absolute 08/04/2021 0.00    • nRBC 08/04/2021 0.0    • Extra Tube 08/04/2021 Hold for add-ons.    • Glucose 08/05/2021 131*   • BUN 08/05/2021 12    • Creatinine 08/05/2021 0.88    • Sodium 08/05/2021 138    • Potassium 08/05/2021 4.1    • Chloride 08/05/2021 105    • CO2 08/05/2021 27.0    • Calcium 08/05/2021 8.3*   • eGFR Non  Amer 08/05/2021 82    • BUN/Creatinine Ratio 08/05/2021 13.6    • Anion Gap 08/05/2021 6.0    • WBC 08/05/2021 10.40    • RBC 08/05/2021 4.09*   • Hemoglobin 08/05/2021 13.2    • Hematocrit 08/05/2021 38.8    • MCV 08/05/2021 95.0    • MCH 08/05/2021 32.2    • MCHC 08/05/2021 33.9    • RDW 08/05/2021 13.0    • RDW-SD 08/05/2021 42.7    • MPV 08/05/2021 8.7    • Platelets 08/05/2021 167    • Neutrophil % 08/05/2021 86.4*   • Lymphocyte % 08/05/2021 6.2*   • Monocyte % 08/05/2021 7.0    • Eosinophil % 08/05/2021 0.2*   • Basophil % 08/05/2021 0.2    • Neutrophils, Absolute 08/05/2021 9.00*   • Lymphocytes, Absolute 08/05/2021 0.60*   • Monocytes, Absolute 08/05/2021 0.70    • Eosinophils, Absolute 08/05/2021 0.00    • Basophils, Absolute 08/05/2021 0.00    • nRBC 08/05/2021 0.0                  Edgar Main MD  11/03/21  .

## 2021-11-08 ENCOUNTER — TELEPHONE (OUTPATIENT)
Dept: PHYSICAL THERAPY | Facility: CLINIC | Age: 86
End: 2021-11-08

## 2021-11-08 NOTE — TELEPHONE ENCOUNTER
Called pt due to no-show for today's appt.  He had cancelled several appts in the past 2 weeks due to being exposed to COVID.  He thought all of his appts were taken off and he was just going to call when he was ready to come back.  He has been to the foot MD and now has a boot to wear.  He agreed to be here Wednesday for a visit to do a re-eval new POC for his balance episode and we will discuss further appts at that time.

## 2021-11-10 ENCOUNTER — TREATMENT (OUTPATIENT)
Dept: PHYSICAL THERAPY | Facility: CLINIC | Age: 86
End: 2021-11-10
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
## 2021-11-10 DIAGNOSIS — R26.2 DIFFICULTY WALKING: ICD-10-CM

## 2021-11-10 DIAGNOSIS — R26.81 UNSTEADY GAIT: Primary | ICD-10-CM

## 2021-11-10 PROCEDURE — 97110 THERAPEUTIC EXERCISES: CPT | Performed by: PHYSICAL THERAPIST

## 2021-11-10 PROCEDURE — 97112 NEUROMUSCULAR REEDUCATION: CPT | Performed by: PHYSICAL THERAPIST

## 2021-11-10 NOTE — PROGRESS NOTES
Re-Assessment / Re-Certification        Patient: Mark Busch Jr.   : 1933  Diagnosis/ICD-10 Code:  Unsteady gait [R26.81]  Referring practitioner: Melvin Paz MD  Date of Initial Visit: Type: THERAPY  Noted: 2021  Today's Date: 11/10/2021  Patient seen for 11 sessions      Subjective:     Subjective Questionnaire: ABC: 84                                                 DGI:    Clinical Progress: improved  Home Program Compliance: Yes  Treatment has included: therapeutic exercise, neuromuscular re-education, therapeutic activity and gait training    Subjective Evaluation    History of Present Illness  Mechanism of injury: Pt went to the foot MD and he got a boot to wear.  He feels like it does stabilize his ankle and he feels more steady.  He did some yard work with it and it definitely helps. He was told to wear it several hours at a time and he has been wearing it when hie goes out of the house to work in the yard, shop.  It is pretty comfortable.  He can not tell any difference in his ankle yet without the boot.  He rates improvement at this point in rehab at 50%.      Pain  Current pain ratin         Objective     Active Range of Motion     BLE:  Hip ext: 8 deg;  Hip abd 15 deg; knee ext 5-10 deg;  right ankle DF 3 deg with pain if combined with inversion; left ankle DF 5 deg     Strength/Myotome Testing     BLE:  Hip ext 3-/3;  Hip abd right 4-    Left 3+/4-            Knee ext and flex 4;  Ankle DF left 4; right 3+/4-; ev right 4, L 4-;  Inv right 4, Left unable to test due to pain          Lumbar Comments  Sit to stand:  Std chair; no longer uses arms on for assist; R foot post to L well under chair; cont FHRS position throughout motion but less than at eval     Transfers:  sps (I) with FHRS overall and some foot shuffling  Bed mobility:  All (I)  But with inc effort with rolling R/L and into prone  Sup to and from sitting:  (I) with some initial inc effort     Gait:  (I) with  no AD; dec knee and hip flex bilat; right LE with some circumduction; FHRS overall and with hip flexion      MCTSIB:  cond 1:  WNL                  cond 2:  WNL                   cond 3:  30 sec with min inc sway                  cond 4:  30 sec with significant inc sway     See Exercise, Manual, and Modality Logs for complete treatment.     Assessment & Plan       Plan  Therapy options: will be seen for skilled physical therapy services  Planned therapy interventions: balance/weight-bearing training, body mechanics training, functional ROM exercises, gait training, home exercise program, manual therapy, motor coordination training, neuromuscular re-education, postural training, strengthening, stretching and therapeutic activities  Frequency: 2x week  Plan details: 13 weeks       Pt has met 4/5 STGs and 1/4 LTGs at this time.  Pt continues to have issues with balance with walking, working in the yard, pain with left ankle that effects his walking.  Pt would benefit from continued skilled PT to address impairments, dec pain, work toward meeting remaining goals and maximize function.         STGs:  6 weeks  1.  Pt to tolerate initial HEP.  MET  2.  Pt to tolerate advancement of HEP as indicated.  MET  3.  Pt to report at least 25% improvement in imbalance symptoms.  MET  4.  Pt to improve balance with MCTSIB cond 2 without inc sway.  MET  5.  Pt to improve ability of stepping over objects and walking with vertical head movements  to score of 2 on DGI      LTGs:  By DC  1.  Pt to be (I) with HEP.  2.  Pt to report at least 75% improvement in imbalance symptoms.  3.  Pt to improve function as indicated by improved score on ABC and DGI as compared to eval to allow better performance of daily tasks like yard work, squatting and bending over. MET  4.  Pt to improve balance as indicated by improved performance on MCTSIB cond 3 and 4 to allow better performance of walking for exercise and yard work.      Progress toward  previous goals: Partially Met    Recommendations: Continue as planned  Timeframe: 3 months  Prognosis to achieve goals: good    PT Signature: Sima Jiang PT      Based upon review of the patient's progress and continued therapy plan, it is my medical opinion that Mark Busch should continue physical therapy treatment at [facility] [address] [facility]  [address] [facility] [place] [address] [phone].    Signature: __________________________________  Melvin Paz MD  Please sign and return via fax to [phone].. Thank you, [facility].    Timed:         Manual Therapy:         mins  78398;     Therapeutic Exercise:  10       mins  77190;     Neuromuscular Dominga: 20       mins  15005;    Therapeutic Activity:          mins  37128;     Gait Training:           mins  83760;     Ultrasound:          mins  38961;    Ionto                                   mins   28666  Self Care                            mins   66857  Canalith Repos:                  mins   35114          Un-Timed:  Electrical Stimulation:         mins  52670 ( );  Traction          mins 39335  Low Eval          Mins  05535  Mod Eval          Mins  07185  High Eval                            Mins  22450  Re-Eval                               mins  08241      Timed Treatment: 30     mins   Total Treatment:   30     mins

## 2021-12-03 PROBLEM — R97.20 ELEVATED PSA: Status: ACTIVE | Noted: 2021-01-01

## 2021-12-03 NOTE — PROGRESS NOTES
The ABCs of the Annual Wellness Visit  Subsequent Medicare Wellness Visit    Chief Complaint   Patient presents with   • Medicare Wellness-subsequent     Subjective    History of Present Illness:  Mark Busch Jr. is a 88 y.o. male who presents for a Subsequent Medicare Wellness Visit.    L ankle pain: patient reports weakness of the L ankle, which he attributes to numerous ankle sprains earlier in his life. He did see a foot/ankle doctor (Dr ROTHMAN) who provided a brace to help support this.     Black nail: patient reports black area under the nail of the great toe of the L foot. This has been present for the past 2 weeks. It initially grew in size but has now been stable.     Elevated PSA: last PSA 12.4 from 8/2020. Follows w/ urology ([facility]). Previous biopsy negative and difficult to obtain due to hx partial colectomy from prior malignancy. Simply monitoring w/ serial labs.    Overactive bladder: maintained on oxybutynin 5mg daily. Still having some urinary incontinence and wears adult diaper at night due to accidents. The medicine does help but reports urinary frequency every 2 hours during the day. Follows w/ urology    HTN: 138/72 today. Maintained on pindolol 5 BID. No LH/dizziness, CP or SOB.    HLD: maintained on atorvastatin 20mg daily. No myalgias or other complaints.    Hypothyroidism: maintained on levothyroxine 25mcg daily. No weight fluctuation, heat/cold intolerance, constipation, palpitations.    GERD: maintained on pantoprazole 40mg daily. No heartburn, reflux, dysphagia.     DM: last a1c 7.2. Not maintained on any medication. No numbness in feet. No recent microalb. Last eye exam 2020    Seizure: patient reports episodes of nausea, projectile vomiting, syncope. These occur about twice per year but has been less recently. Maintained on oxcarbazepine 300 BID. Not sure if this has been helpful but he reports it has been helpful mentally. Has been on this med for years    Colon cancer: dx in 2004, s/p  partial colectomy w/ colostomy followed by 14 rounds radiation. Has been in remission since that time. No longer following w/ oncology or GI.     The following portions of the patient's history were reviewed and   updated as appropriate: allergies, current medications, past family history, past medical history, past social history, past surgical history and problem list.    Compared to one year ago, the patient feels his physical   health is the same.    Compared to one year ago, the patient feels his mental   health is the same.    Recent Hospitalizations:  This patient has had a [facility] admission record on file within the last 365 days.  3/2021 and 8/2021- recurrent SBO    Current Medical Providers:  Patient Care Team:  Melvin Paz MD as PCP - General (Internal Medicine)  Maggie Swain APRN (Nurse Practitioner)    Outpatient Medications Prior to Visit   Medication Sig Dispense Refill   • atorvastatin (LIPITOR) 20 MG tablet Take 20 mg by mouth Daily.     • cetirizine (zyrTEC) 10 MG tablet Take 10 mg by mouth Daily.     • levothyroxine (SYNTHROID, LEVOTHROID) 25 MCG tablet Take 25 mcg by mouth Every Morning.     • ondansetron ODT (ZOFRAN-ODT) 8 MG disintegrating tablet Place 1 tablet on the tongue Every 8 (Eight) Hours As Needed for Nausea or Vomiting. 30 tablet 1   • OXcarbazepine (TRILEPTAL) 300 MG tablet Take 1 tablet by mouth twice daily 180 tablet 0   • oxybutynin XL (DITROPAN-XL) 5 MG 24 hr tablet Take 1 tablet by mouth Daily. 90 tablet 2   • pindolol (VISKEN) 5 MG tablet Take 1 tablet by mouth 2 (Two) Times a Day. 180 tablet 2   • sertraline (ZOLOFT) 50 MG tablet Take 75 mg by mouth Daily.     • traZODone (DESYREL) 50 MG tablet Take 50 mg by mouth Every Night.     • pantoprazole (PROTONIX) 40 MG EC tablet Take 40 mg by mouth Daily.       No facility-administered medications prior to visit.     No opioid medication identified on active medication list. I have reviewed chart for  other potential  high risk medication/s and harmful drug interactions in the elderly.        Aspirin is not on active medication list.  Aspirin use is not indicated based on review of current medical condition/s. Risk of harm outweighs potential benefits.  .    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  ACP discussion was held with the patient during this visit. Patient has an advance directive (not in EMR), copy requested. Has family in area  Review of Systems   Constitutional: Negative for chills and fever.   HENT: Negative for congestion, rhinorrhea and trouble swallowing.    Eyes: Negative for visual disturbance.   Respiratory: Negative for cough and shortness of breath.    Cardiovascular: Negative for chest pain and palpitations.   Gastrointestinal: Negative for abdominal pain and vomiting.   Endocrine: Negative for cold intolerance and heat intolerance.   Genitourinary: Positive for difficulty urinating. Negative for dysuria.   Musculoskeletal: Positive for arthralgias. Negative for back pain.   Skin: Positive for color change. Negative for rash.   Neurological: Negative for dizziness, light-headedness and numbness.   Psychiatric/Behavioral: Negative for dysphoric mood and sleep disturbance. The patient is not nervous/anxious.         Objective    Vitals:    12/03/21  "0912   BP: 138/72   BP Location: Left arm   Patient Position: Sitting   Cuff Size: Adult   Pulse: 73   Resp: 16   Temp: 97.5 °F (36.4 °C)   TempSrc: Temporal   SpO2: 99%   Weight: 73 kg (161 lb)   Height: 167.6 cm (66\")     BMI Readings from Last 1 Encounters:   12/03/21 25.99 kg/m²   BMI is above normal parameters. Recommendations include: exercise counseling and nutrition counseling    Does the patient have evidence of cognitive impairment? No    Physical Exam  Constitutional:       General: He is not in acute distress.     Appearance: He is well-developed.   HENT:      Head: Normocephalic and atraumatic.      Right Ear: Tympanic membrane and external ear normal. There is no impacted cerumen.      Left Ear: Tympanic membrane and external ear normal. There is no impacted cerumen.      Nose: Nose normal.      Mouth/Throat:      Mouth: Mucous membranes are moist.      Pharynx: No oropharyngeal exudate or posterior oropharyngeal erythema.   Eyes:      General: No scleral icterus.        Right eye: No discharge.         Left eye: No discharge.      Extraocular Movements: Extraocular movements intact.      Conjunctiva/sclera: Conjunctivae normal.      Pupils: Pupils are equal, round, and reactive to light.   Neck:      Thyroid: No thyromegaly.      Vascular: No carotid bruit.   Cardiovascular:      Rate and Rhythm: Normal rate and regular rhythm.      Heart sounds: Normal heart sounds. No murmur heard.      Pulmonary:      Effort: Pulmonary effort is normal. No respiratory distress.      Breath sounds: Normal breath sounds. No wheezing or rales.   Abdominal:      General: Bowel sounds are normal. There is no distension.      Palpations: Abdomen is soft.      Tenderness: There is no abdominal tenderness.      Comments: LLQ ostomy   Musculoskeletal:         General: No deformity. Normal range of motion.      Cervical back: Normal range of motion and neck supple.   Lymphadenopathy:      Cervical: No cervical adenopathy. "   Skin:     General: Skin is warm and dry.      Findings: Lesion present. No rash.   Neurological:      General: No focal deficit present.      Mental Status: He is alert and oriented to person, place, and time. Mental status is at baseline.      Cranial Nerves: No cranial nerve deficit.      Sensory: No sensory deficit.   Psychiatric:         Behavior: Behavior normal.         Thought Content: Thought content normal.       Lab Results   Component Value Date    TRIG 153 (H) 12/03/2021    HDL 42 12/03/2021    LDL 97 12/03/2021    VLDL 27 12/03/2021    HGBA1C 7.0 (H) 12/03/2021        HEALTH RISK ASSESSMENT    Smoking Status:  Social History     Tobacco Use   Smoking Status Never Smoker   Smokeless Tobacco Never Used     Alcohol Consumption:  Social History     Substance and Sexual Activity   Alcohol Use No     Fall Risk Screen:  LORRI Fall Risk Assessment was completed, and patient is at HIGH risk for falls. Assessment completed on:12/3/2021  He reports no falls in the last year. Recently did PT for some gait instability/ankle weakness.    Depression Screening:  PHQ-2/PHQ-9 Depression Screening 12/3/2021   Little interest or pleasure in doing things 0   Feeling down, depressed, or hopeless 0   Total Score 0     Health Habits and Functional and Cognitive Screening:  Functional & Cognitive Status 12/3/2021   Do you have difficulty preparing food and eating? No   Do you have difficulty bathing yourself, getting dressed or grooming yourself? No   Do you have difficulty using the toilet? No   Do you have difficulty moving around from place to place? No   Do you have trouble with steps or getting out of a bed or a chair? No   Current Diet Well Balanced Diet   Dental Exam Up to date   Eye Exam Up to date   Exercise (times per week) 4 times per week   Current Exercises Include Walking;Yard Work   Current Exercise Activities Include -   Do you need help using the phone?  No   Are you deaf or do you have serious difficulty  hearing?  Yes   Do you need help with transportation? No   Do you need help shopping? No   Do you need help preparing meals?  Yes   Do you need help with housework?  No   Do you need help with laundry? No   Do you need help taking your medications? No   Do you need help managing money? No   Do you ever drive or ride in a car without wearing a seat belt? No   Have you felt unusual stress, anger or loneliness in the last month? No   Who do you live with? Child   If you need help, do you have trouble finding someone available to you? No   Have you been bothered in the last four weeks by sexual problems? No   Do you have difficulty concentrating, remembering or making decisions? No     Age-appropriate Screening Schedule:  Refer to the list below for future screening recommendations based on patient's age, sex and/or medical conditions. Orders for these recommended tests are listed in the plan section. The patient has been provided with a written plan.    Health Maintenance   Topic Date Due   • DIABETIC EYE EXAM  01/21/2022 (Originally 7/8/2020)   • ZOSTER VACCINE (2 of 2) 12/12/2022 (Originally 12/15/2021)   • HEMOGLOBIN A1C  06/03/2022   • LIPID PANEL  12/03/2022   • URINE MICROALBUMIN  12/03/2022   • TDAP/TD VACCINES (2 - Td or Tdap) 09/15/2024   • INFLUENZA VACCINE  Completed        Assessment/Plan   CMS Preventative Services Quick Reference  Risk Factors Identified During Encounter  Immunizations Discussed/Encouraged (specific Immunizations; Shingrix  The above risks/problems have been discussed with the patient.  Follow up actions/plans if indicated are seen below in the Assessment/Plan Section.  Pertinent information has been shared with the patient in the After Visit Summary.    Diagnoses and all orders for this visit:    1. Medicare annual wellness visit, subsequent (Primary)  -     CBC & Differential  -     Comprehensive Metabolic Panel  -     Hemoglobin A1c  -     Lipid Panel  -     TSH  -     T4, Free  -      Vitamin D 25 Hydroxy  -     Vitamin B12  -  Counseled regarding exercise and preventative health maintenance items/immunizations below    2. Ankle weakness   -Continue brace as needed   -Recommend regular exercise/strengthening    3. Black nails: Most reminiscent of subungual hematoma/bruise, especially given 2-week duration   -Monitor    4. Elevated PSA: last PSA 12.4 from 8/2020.  Previous biopsy negative  -     PSA Screen  - Continue urology follow-up ([facility])    5. Overactive bladder  -     Urinalysis With Culture If Indicated - Urine, Clean Catch  - Continue home oxybutynin 5 mg daily  - Continue urology follow-up    6. Essential hypertension: 138/72 today  -     Comprehensive Metabolic Panel  - Continue home pindolol 5 mg twice daily    7. Mixed hyperlipidemia  -     Lipid Panel  - Continue home atorvastatin 20 mg daily    8. Hypothyroidism, unspecified type  -     TSH  -     T4, Free  - Continue home levothyroxine 25 mcg daily for now    9. Gastroesophageal reflux disease, unspecified whether esophagitis present   -Try reducing pantoprazole to 20 mg daily    10. Type 2 diabetes mellitus without complication, without long-term current use of insulin (HCC): Last A1c 7.2. No recent microalb. Last eye exam 2020  -     Microalbumin / Creatinine Urine Ratio - Urine, Clean Catch  -     Comprehensive Metabolic Panel  -     Hemoglobin A1c  - Monitor off medication  - Continue home statin  - Recommend regular exercise and annual eye exam    11. Seizure-like activity (HCC): Unclear diagnosis.  Maintained on Trileptal for years   -Continue home oxcarbazepine 300 twice daily    12. History of rectal cancer: dx in 2004, s/p partial colectomy w/ colostomy followed by 14 rounds radiation   -Monitor    13. Vitamin D deficiency, unspecified   -     Vitamin D 25 Hydroxy    14. Encounter for screening for malignant neoplasm of prostate   -     PSA Screen    Preventative  Colonoscopy: aged out  PSA: 12.4 (8/2020)- follows w/  urology annually  DEXA: 8/2021- normal  Shingles: Completed 1 of 2  Pneumonia: Pneumovax 1/2007  Tdap: 9/2014  Influenza: 10/2021  COVID: Completed 3 shots Moderna (11/2021)    Follow Up:   Return in about 6 months (around 6/3/2022) for Recheck- 30min.     An After Visit Summary and PPPS were made available to the patient.

## 2022-01-01 ENCOUNTER — LAB (OUTPATIENT)
Dept: FAMILY MEDICINE CLINIC | Facility: CLINIC | Age: 87
End: 2022-01-01

## 2022-01-01 ENCOUNTER — APPOINTMENT (OUTPATIENT)
Dept: CT IMAGING | Facility: HOSPITAL | Age: 87
End: 2022-01-01

## 2022-01-01 ENCOUNTER — HOSPITAL ENCOUNTER (EMERGENCY)
Facility: HOSPITAL | Age: 87
Discharge: HOME | End: 2022-03-15
Attending: EMERGENCY MEDICINE | Admitting: EMERGENCY MEDICINE

## 2022-01-01 ENCOUNTER — TELEPHONE (OUTPATIENT)
Dept: FAMILY MEDICINE CLINIC | Facility: CLINIC | Age: 87
End: 2022-01-01

## 2022-01-01 ENCOUNTER — OFFICE VISIT (OUTPATIENT)
Dept: FAMILY MEDICINE CLINIC | Facility: CLINIC | Age: 87
End: 2022-01-01

## 2022-01-01 ENCOUNTER — HOSPITAL ENCOUNTER (EMERGENCY)
Facility: HOSPITAL | Age: 87
Discharge: HOME | End: 2022-05-29
Attending: EMERGENCY MEDICINE | Admitting: EMERGENCY MEDICINE

## 2022-01-01 ENCOUNTER — PATIENT OUTREACH (OUTPATIENT)
Dept: CASE MANAGEMENT | Facility: OTHER | Age: 87
End: 2022-01-01

## 2022-01-01 ENCOUNTER — DOCUMENTATION (OUTPATIENT)
Dept: PHYSICAL THERAPY | Facility: CLINIC | Age: 87
End: 2022-01-01

## 2022-01-01 VITALS
SYSTOLIC BLOOD PRESSURE: 170 MMHG | TEMPERATURE: 96.4 F | WEIGHT: 161 LBS | BODY MASS INDEX: 25.88 KG/M2 | OXYGEN SATURATION: 100 % | HEIGHT: 66 IN | DIASTOLIC BLOOD PRESSURE: 74 MMHG | RESPIRATION RATE: 20 BRPM | HEART RATE: 55 BPM

## 2022-01-01 VITALS
OXYGEN SATURATION: 99 % | HEIGHT: 66 IN | WEIGHT: 157 LBS | TEMPERATURE: 96.6 F | HEART RATE: 56 BPM | DIASTOLIC BLOOD PRESSURE: 72 MMHG | RESPIRATION RATE: 18 BRPM | SYSTOLIC BLOOD PRESSURE: 130 MMHG | BODY MASS INDEX: 25.23 KG/M2

## 2022-01-01 VITALS
RESPIRATION RATE: 15 BRPM | WEIGHT: 160 LBS | HEART RATE: 77 BPM | BODY MASS INDEX: 25.71 KG/M2 | OXYGEN SATURATION: 95 % | DIASTOLIC BLOOD PRESSURE: 79 MMHG | SYSTOLIC BLOOD PRESSURE: 151 MMHG | HEIGHT: 66 IN | TEMPERATURE: 98.5 F

## 2022-01-01 VITALS
BODY MASS INDEX: 25.51 KG/M2 | OXYGEN SATURATION: 98 % | TEMPERATURE: 98.4 F | WEIGHT: 158.73 LBS | HEART RATE: 62 BPM | SYSTOLIC BLOOD PRESSURE: 132 MMHG | RESPIRATION RATE: 16 BRPM | HEIGHT: 66 IN | DIASTOLIC BLOOD PRESSURE: 67 MMHG

## 2022-01-01 DIAGNOSIS — E11.9 TYPE 2 DIABETES MELLITUS WITHOUT COMPLICATION, WITHOUT LONG-TERM CURRENT USE OF INSULIN: ICD-10-CM

## 2022-01-01 DIAGNOSIS — R11.2 INTRACTABLE VOMITING WITH NAUSEA, UNSPECIFIED VOMITING TYPE: Primary | ICD-10-CM

## 2022-01-01 DIAGNOSIS — K21.9 GASTROESOPHAGEAL REFLUX DISEASE, UNSPECIFIED WHETHER ESOPHAGITIS PRESENT: Chronic | ICD-10-CM

## 2022-01-01 DIAGNOSIS — S09.90XA INJURY OF HEAD, INITIAL ENCOUNTER: ICD-10-CM

## 2022-01-01 DIAGNOSIS — E55.9 VITAMIN D DEFICIENCY: ICD-10-CM

## 2022-01-01 DIAGNOSIS — R26.81 GAIT INSTABILITY: ICD-10-CM

## 2022-01-01 DIAGNOSIS — S06.0X0A CONCUSSION WITHOUT LOSS OF CONSCIOUSNESS, INITIAL ENCOUNTER: ICD-10-CM

## 2022-01-01 DIAGNOSIS — R32 URINARY INCONTINENCE, UNSPECIFIED TYPE: ICD-10-CM
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
## 2022-01-01 DIAGNOSIS — I10 ESSENTIAL HYPERTENSION: ICD-10-CM

## 2022-01-01 DIAGNOSIS — R26.81 GAIT INSTABILITY: Primary | ICD-10-CM

## 2022-01-01 DIAGNOSIS — I10 ESSENTIAL HYPERTENSION: Primary | ICD-10-CM

## 2022-01-01 LAB
25(OH)D3 SERPL-MCNC: 42.1 NG/ML (ref 30–100)
ALBUMIN SERPL-MCNC: 4.5 G/DL (ref 3.5–5.2)
ALBUMIN SERPL-MCNC: 4.6 G/DL (ref 3.5–5.2)
ALBUMIN SERPL-MCNC: 4.6 G/DL (ref 3.5–5.2)
ALBUMIN/GLOB SERPL: 1.6 G/DL
ALBUMIN/GLOB SERPL: 1.8 G/DL
ALBUMIN/GLOB SERPL: 2 G/DL
ALP SERPL-CCNC: 106 U/L (ref 39–117)
ALP SERPL-CCNC: 108 U/L (ref 39–117)
ALP SERPL-CCNC: 114 U/L (ref 39–117)
ALT SERPL W P-5'-P-CCNC: 14 U/L (ref 1–41)
ALT SERPL W P-5'-P-CCNC: 14 U/L (ref 1–41)
ALT SERPL W P-5'-P-CCNC: 16 U/L (ref 1–41)
AMPHET+METHAMPHET UR QL: NEGATIVE
ANION GAP SERPL CALCULATED.3IONS-SCNC: 10 MMOL/L (ref 5–15)
ANION GAP SERPL CALCULATED.3IONS-SCNC: 11 MMOL/L (ref 5–15)
AST SERPL-CCNC: 17 U/L (ref 1–40)
AST SERPL-CCNC: 17 U/L (ref 1–40)
AST SERPL-CCNC: 21 U/L (ref 1–40)
BARBITURATES UR QL SCN: NEGATIVE
BASOPHILS # BLD AUTO: 0 10*3/MM3 (ref 0–0.2)
BASOPHILS # BLD AUTO: 0 10*3/MM3 (ref 0–0.2)
BASOPHILS NFR BLD AUTO: 0.1 % (ref 0–1.5)
BASOPHILS NFR BLD AUTO: 0.4 % (ref 0–1.5)
BENZODIAZ UR QL SCN: NEGATIVE
BILIRUB SERPL-MCNC: 0.3 MG/DL (ref 0–1.2)
BILIRUB SERPL-MCNC: 0.4 MG/DL (ref 0–1.2)
BILIRUB SERPL-MCNC: 0.5 MG/DL (ref 0–1.2)
BILIRUB UR QL STRIP: NEGATIVE
BILIRUB UR QL STRIP: NEGATIVE
BUN SERPL-MCNC: 17 MG/DL (ref 8–23)
BUN SERPL-MCNC: 18 MG/DL (ref 8–23)
BUN SERPL-MCNC: 18 MG/DL (ref 8–23)
BUN SERPL-MCNC: 20 MG/DL (ref 8–23)
BUN/CREAT SERPL: 18.5 (ref 7–25)
BUN/CREAT SERPL: 19.6 (ref 7–25)
BUN/CREAT SERPL: 20.4 (ref 7–25)
BUN/CREAT SERPL: 21.4 (ref 7–25)
CALCIUM SPEC-SCNC: 9.3 MG/DL (ref 8.6–10.5)
CALCIUM SPEC-SCNC: 9.4 MG/DL (ref 8.6–10.5)
CALCIUM SPEC-SCNC: 9.7 MG/DL (ref 8.6–10.5)
CALCIUM SPEC-SCNC: 9.9 MG/DL (ref 8.6–10.5)
CANNABINOIDS SERPL QL: NEGATIVE
CHLORIDE SERPL-SCNC: 100 MMOL/L (ref 98–107)
CHLORIDE SERPL-SCNC: 102 MMOL/L (ref 98–107)
CHLORIDE SERPL-SCNC: 102 MMOL/L (ref 98–107)
CHLORIDE SERPL-SCNC: 103 MMOL/L (ref 98–107)
CLARITY UR: CLEAR
CLARITY UR: CLEAR
CO2 SERPL-SCNC: 24 MMOL/L (ref 22–29)
CO2 SERPL-SCNC: 25 MMOL/L (ref 22–29)
CO2 SERPL-SCNC: 25 MMOL/L (ref 22–29)
CO2 SERPL-SCNC: 28 MMOL/L (ref 22–29)
COCAINE UR QL: NEGATIVE
COLOR UR: YELLOW
COLOR UR: YELLOW
CREAT SERPL-MCNC: 0.84 MG/DL (ref 0.76–1.27)
CREAT SERPL-MCNC: 0.92 MG/DL (ref 0.76–1.27)
CREAT SERPL-MCNC: 0.92 MG/DL (ref 0.76–1.27)
CREAT SERPL-MCNC: 0.98 MG/DL (ref 0.76–1.27)
DEPRECATED RDW RBC AUTO: 42 FL (ref 37–54)
DEPRECATED RDW RBC AUTO: 42.9 FL (ref 37–54)
EGFRCR SERPLBLD CKD-EPI 2021: 73.7 ML/MIN/1.73
EGFRCR SERPLBLD CKD-EPI 2021: 80 ML/MIN/1.73
EGFRCR SERPLBLD CKD-EPI 2021: 80 ML/MIN/1.73
EGFRCR SERPLBLD CKD-EPI 2021: 83.9 ML/MIN/1.73
EOSINOPHIL # BLD AUTO: 0 10*3/MM3 (ref 0–0.4)
EOSINOPHIL # BLD AUTO: 0 10*3/MM3 (ref 0–0.4)
EOSINOPHIL NFR BLD AUTO: 0.2 % (ref 0.3–6.2)
EOSINOPHIL NFR BLD AUTO: 0.9 % (ref 0.3–6.2)
ERYTHROCYTE [DISTWIDTH] IN BLOOD BY AUTOMATED COUNT: 12.8 % (ref 12.3–15.4)
ERYTHROCYTE [DISTWIDTH] IN BLOOD BY AUTOMATED COUNT: 13 % (ref 12.3–15.4)
GLOBULIN UR ELPH-MCNC: 2.3 GM/DL
GLOBULIN UR ELPH-MCNC: 2.5 GM/DL
GLOBULIN UR ELPH-MCNC: 2.8 GM/DL
GLUCOSE SERPL-MCNC: 117 MG/DL (ref 65–99)
GLUCOSE SERPL-MCNC: 121 MG/DL (ref 65–99)
GLUCOSE SERPL-MCNC: 127 MG/DL (ref 65–99)
GLUCOSE SERPL-MCNC: 155 MG/DL (ref 65–99)
GLUCOSE UR STRIP-MCNC: ABNORMAL MG/DL
GLUCOSE UR STRIP-MCNC: NEGATIVE MG/DL
HBA1C MFR BLD: 6.9 % (ref 3.5–5.6)
HBA1C MFR BLD: 7 % (ref 3.5–5.6)
HCT VFR BLD AUTO: 39.6 % (ref 37.5–51)
HCT VFR BLD AUTO: 41.6 % (ref 37.5–51)
HGB BLD-MCNC: 13.4 G/DL (ref 13–17.7)
HGB BLD-MCNC: 14.3 G/DL (ref 13–17.7)
HGB UR QL STRIP.AUTO: NEGATIVE
HGB UR QL STRIP.AUTO: NEGATIVE
HOLD SPECIMEN: NORMAL
KETONES UR QL STRIP: ABNORMAL
KETONES UR QL STRIP: NEGATIVE
LEUKOCYTE ESTERASE UR QL STRIP.AUTO: NEGATIVE
LEUKOCYTE ESTERASE UR QL STRIP.AUTO: NEGATIVE
LIPASE SERPL-CCNC: 16 U/L (ref 13–60)
LYMPHOCYTES # BLD AUTO: 0.5 10*3/MM3 (ref 0.7–3.1)
LYMPHOCYTES # BLD AUTO: 0.5 10*3/MM3 (ref 0.7–3.1)
LYMPHOCYTES NFR BLD AUTO: 10.5 % (ref 19.6–45.3)
LYMPHOCYTES NFR BLD AUTO: 5.4 % (ref 19.6–45.3)
MCH RBC QN AUTO: 32 PG (ref 26.6–33)
MCH RBC QN AUTO: 32 PG (ref 26.6–33)
MCHC RBC AUTO-ENTMCNC: 33.8 G/DL (ref 31.5–35.7)
MCHC RBC AUTO-ENTMCNC: 34.3 G/DL (ref 31.5–35.7)
MCV RBC AUTO: 93.1 FL (ref 79–97)
MCV RBC AUTO: 94.6 FL (ref 79–97)
METHADONE UR QL SCN: NEGATIVE
MONOCYTES # BLD AUTO: 0.5 10*3/MM3 (ref 0.1–0.9)
MONOCYTES # BLD AUTO: 0.5 10*3/MM3 (ref 0.1–0.9)
MONOCYTES NFR BLD AUTO: 10.9 % (ref 5–12)
MONOCYTES NFR BLD AUTO: 5.4 % (ref 5–12)
NEUTROPHILS NFR BLD AUTO: 3.3 10*3/MM3 (ref 1.7–7)
NEUTROPHILS NFR BLD AUTO: 77.3 % (ref 42.7–76)
NEUTROPHILS NFR BLD AUTO: 8.1 10*3/MM3 (ref 1.7–7)
NEUTROPHILS NFR BLD AUTO: 88.9 % (ref 42.7–76)
NITRITE UR QL STRIP: NEGATIVE
NITRITE UR QL STRIP: NEGATIVE
NRBC BLD AUTO-RTO: 0 /100 WBC (ref 0–0.2)
NRBC BLD AUTO-RTO: 0 /100 WBC (ref 0–0.2)
OPIATES UR QL: NEGATIVE
OXYCODONE UR QL SCN: NEGATIVE
PH UR STRIP.AUTO: 8 [PH] (ref 5–8)
PH UR STRIP.AUTO: <=5 [PH] (ref 5–8)
PLATELET # BLD AUTO: 153 10*3/MM3 (ref 140–450)
PLATELET # BLD AUTO: 176 10*3/MM3 (ref 140–450)
PMV BLD AUTO: 7.9 FL (ref 6–12)
PMV BLD AUTO: 8.2 FL (ref 6–12)
POTASSIUM SERPL-SCNC: 4.1 MMOL/L (ref 3.5–5.2)
POTASSIUM SERPL-SCNC: 4.2 MMOL/L (ref 3.5–5.2)
POTASSIUM SERPL-SCNC: 4.2 MMOL/L (ref 3.5–5.2)
POTASSIUM SERPL-SCNC: 4.5 MMOL/L (ref 3.5–5.2)
PROT SERPL-MCNC: 6.9 G/DL (ref 6–8.5)
PROT SERPL-MCNC: 7 G/DL (ref 6–8.5)
PROT SERPL-MCNC: 7.4 G/DL (ref 6–8.5)
PROT UR QL STRIP: NEGATIVE
PROT UR QL STRIP: NEGATIVE
QT INTERVAL: 438 MS
RBC # BLD AUTO: 4.19 10*6/MM3 (ref 4.14–5.8)
RBC # BLD AUTO: 4.47 10*6/MM3 (ref 4.14–5.8)
SODIUM SERPL-SCNC: 136 MMOL/L (ref 136–145)
SODIUM SERPL-SCNC: 137 MMOL/L (ref 136–145)
SODIUM SERPL-SCNC: 139 MMOL/L (ref 136–145)
SODIUM SERPL-SCNC: 140 MMOL/L (ref 136–145)
SP GR UR STRIP: 1.01 (ref 1–1.03)
SP GR UR STRIP: 1.02 (ref 1–1.03)
UROBILINOGEN UR QL STRIP: ABNORMAL
UROBILINOGEN UR QL STRIP: NORMAL
WBC NRBC COR # BLD: 4.3 10*3/MM3 (ref 3.4–10.8)
WBC NRBC COR # BLD: 9.1 10*3/MM3 (ref 3.4–10.8)
WHOLE BLOOD HOLD COAG: NORMAL

## 2022-01-01 PROCEDURE — 80307 DRUG TEST PRSMV CHEM ANLYZR: CPT | Performed by: PHYSICIAN ASSISTANT

## 2022-01-01 PROCEDURE — 80048 BASIC METABOLIC PNL TOTAL CA: CPT | Performed by: PHYSICIAN ASSISTANT

## 2022-01-01 PROCEDURE — 83036 HEMOGLOBIN GLYCOSYLATED A1C: CPT | Performed by: FAMILY MEDICINE

## 2022-01-01 PROCEDURE — 93005 ELECTROCARDIOGRAM TRACING: CPT | Performed by: PHYSICIAN ASSISTANT

## 2022-01-01 PROCEDURE — 99214 OFFICE O/P EST MOD 30 MIN: CPT | Performed by: FAMILY MEDICINE

## 2022-01-01 PROCEDURE — 82306 VITAMIN D 25 HYDROXY: CPT | Performed by: FAMILY MEDICINE

## 2022-01-01 PROCEDURE — 80053 COMPREHEN METABOLIC PANEL: CPT | Performed by: EMERGENCY MEDICINE

## 2022-01-01 PROCEDURE — 80053 COMPREHEN METABOLIC PANEL: CPT | Performed by: FAMILY MEDICINE

## 2022-01-01 PROCEDURE — 96375 TX/PRO/DX INJ NEW DRUG ADDON: CPT

## 2022-01-01 PROCEDURE — 85025 COMPLETE CBC W/AUTO DIFF WBC: CPT | Performed by: PHYSICIAN ASSISTANT

## 2022-01-01 PROCEDURE — 81003 URINALYSIS AUTO W/O SCOPE: CPT | Performed by: EMERGENCY MEDICINE

## 2022-01-01 PROCEDURE — 72125 CT NECK SPINE W/O DYE: CPT

## 2022-01-01 PROCEDURE — 83690 ASSAY OF LIPASE: CPT | Performed by: EMERGENCY MEDICINE

## 2022-01-01 PROCEDURE — 36415 COLL VENOUS BLD VENIPUNCTURE: CPT | Performed by: FAMILY MEDICINE

## 2022-01-01 PROCEDURE — 25010000002 ONDANSETRON PER 1 MG: Performed by: EMERGENCY MEDICINE

## 2022-01-01 PROCEDURE — 25010000002 DROPERIDOL PER 5 MG: Performed by: EMERGENCY MEDICINE

## 2022-01-01 PROCEDURE — 70450 CT HEAD/BRAIN W/O DYE: CPT

## 2022-01-01 PROCEDURE — 99283 EMERGENCY DEPT VISIT LOW MDM: CPT

## 2022-01-01 PROCEDURE — 99284 EMERGENCY DEPT VISIT MOD MDM: CPT

## 2022-01-01 PROCEDURE — 85025 COMPLETE CBC W/AUTO DIFF WBC: CPT | Performed by: EMERGENCY MEDICINE

## 2022-01-01 PROCEDURE — 25010000002 DIPHENHYDRAMINE PER 50 MG: Performed by: EMERGENCY MEDICINE

## 2022-01-01 PROCEDURE — 96361 HYDRATE IV INFUSION ADD-ON: CPT

## 2022-01-01 PROCEDURE — 81003 URINALYSIS AUTO W/O SCOPE: CPT | Performed by: PHYSICIAN ASSISTANT

## 2022-01-01 PROCEDURE — 96374 THER/PROPH/DIAG INJ IV PUSH: CPT

## 2022-01-01 RX ORDER — TRAZODONE HYDROCHLORIDE 50 MG/1
TABLET ORAL
Qty: 90 TABLET | Refills: 1 | Status: SHIPPED | OUTPATIENT
Start: 2022-01-01 | End: 2022-01-01

## 2022-01-01 RX ORDER — ONDANSETRON 2 MG/ML
4 INJECTION INTRAMUSCULAR; INTRAVENOUS ONCE
Status: COMPLETED | OUTPATIENT
Start: 2022-01-01 | End: 2022-01-01

## 2022-01-01 RX ORDER — TRAZODONE HYDROCHLORIDE 50 MG/1
TABLET ORAL
Qty: 90 TABLET | Refills: 3 | Status: SHIPPED | OUTPATIENT
Start: 2022-01-01

## 2022-01-01 RX ORDER — PROCHLORPERAZINE MALEATE 10 MG
10 TABLET ORAL EVERY 6 HOURS PRN
Qty: 6 TABLET | Refills: 0 | Status: SHIPPED | OUTPATIENT
Start: 2022-01-01 | End: 2022-01-01

## 2022-01-01 RX ORDER — PINDOLOL 5 MG/1
TABLET ORAL
Qty: 180 TABLET | Refills: 3 | Status: SHIPPED | OUTPATIENT
Start: 2022-01-01

## 2022-01-01 RX ORDER — ATORVASTATIN CALCIUM 20 MG/1
20 TABLET, FILM COATED ORAL DAILY
Qty: 90 TABLET | Refills: 3 | Status: SHIPPED | OUTPATIENT
Start: 2022-01-01

## 2022-01-01 RX ORDER — SODIUM CHLORIDE, SODIUM LACTATE, POTASSIUM CHLORIDE, CALCIUM CHLORIDE 600; 310; 30; 20 MG/100ML; MG/100ML; MG/100ML; MG/100ML
125 INJECTION, SOLUTION INTRAVENOUS CONTINUOUS
Status: DISCONTINUED | OUTPATIENT
Start: 2022-01-01 | End: 2022-01-01 | Stop reason: HOSPADM

## 2022-01-01 RX ORDER — ERGOCALCIFEROL 1.25 MG/1
CAPSULE ORAL
Qty: 13 CAPSULE | Refills: 3 | Status: SHIPPED | OUTPATIENT
Start: 2022-01-01

## 2022-01-01 RX ORDER — DROPERIDOL 2.5 MG/ML
1.25 INJECTION, SOLUTION INTRAMUSCULAR; INTRAVENOUS ONCE
Status: COMPLETED | OUTPATIENT
Start: 2022-01-01 | End: 2022-01-01

## 2022-01-01 RX ORDER — OXCARBAZEPINE 300 MG/1
TABLET, FILM COATED ORAL
Qty: 180 TABLET | Refills: 0 | Status: SHIPPED | OUTPATIENT
Start: 2022-01-01 | End: 2022-01-01

## 2022-01-01 RX ORDER — OXCARBAZEPINE 300 MG/1
TABLET, FILM COATED ORAL
Qty: 180 TABLET | Refills: 0 | Status: SHIPPED | OUTPATIENT
Start: 2022-01-01

## 2022-01-01 RX ORDER — LEVOTHYROXINE SODIUM 0.03 MG/1
TABLET ORAL
Qty: 90 TABLET | Refills: 3 | Status: SHIPPED | OUTPATIENT
Start: 2022-01-01

## 2022-01-01 RX ORDER — DIPHENHYDRAMINE HYDROCHLORIDE 50 MG/ML
25 INJECTION INTRAMUSCULAR; INTRAVENOUS ONCE
Status: COMPLETED | OUTPATIENT
Start: 2022-01-01 | End: 2022-01-01

## 2022-01-01 RX ORDER — ONDANSETRON 8 MG/1
8 TABLET, ORALLY DISINTEGRATING ORAL EVERY 8 HOURS PRN
Qty: 30 TABLET | Refills: 1 | Status: SHIPPED | OUTPATIENT
Start: 2022-01-01

## 2022-01-01 RX ORDER — OXYBUTYNIN CHLORIDE 5 MG/1
5 TABLET, EXTENDED RELEASE ORAL DAILY
Qty: 90 TABLET | Refills: 3 | Status: SHIPPED | OUTPATIENT
Start: 2022-01-01

## 2022-01-01 RX ORDER — PANTOPRAZOLE SODIUM 20 MG/1
20 TABLET, DELAYED RELEASE ORAL DAILY
Qty: 90 TABLET | Refills: 3 | Status: SHIPPED | OUTPATIENT
Start: 2022-01-01

## 2022-01-01 RX ORDER — SODIUM CHLORIDE 0.9 % (FLUSH) 0.9 %
10 SYRINGE (ML) INJECTION AS NEEDED
Status: DISCONTINUED | OUTPATIENT
Start: 2022-01-01 | End: 2022-01-01 | Stop reason: HOSPADM

## 2022-01-01 RX ADMIN — DROPERIDOL 1.25 MG: 2.5 INJECTION, SOLUTION INTRAMUSCULAR; INTRAVENOUS at 06:00

## 2022-01-01 RX ADMIN — ONDANSETRON 4 MG: 2 INJECTION INTRAMUSCULAR; INTRAVENOUS at 05:42

## 2022-01-01 RX ADMIN — SODIUM CHLORIDE, POTASSIUM CHLORIDE, SODIUM LACTATE AND CALCIUM CHLORIDE 125 ML/HR: 600; 310; 30; 20 INJECTION, SOLUTION INTRAVENOUS at 05:42

## 2022-01-01 RX ADMIN — DIPHENHYDRAMINE HYDROCHLORIDE 25 MG: 50 INJECTION, SOLUTION INTRAMUSCULAR; INTRAVENOUS at 05:59

## 2022-01-01 NOTE — OUTREACH NOTE
Prep Survey      Responses   [facility] facility patient discharged from?  [facility]   Is LACE score < 7 ?  No   Emergency Room discharge w/ pulse ox?  No   Eligibility  [facility]   Date of Admission  08/04/21   Date of Discharge  08/05/21   Discharge Disposition  Home or Self Care   Discharge diagnosis  SBO   Does the patient have one of the following disease processes/diagnoses(primary or secondary)?  Other   Does the patient have Home health ordered?  No   Is there a DME ordered?  No   Prep survey completed?  Yes          Talia Middleton RN        
Wounds

## 2022-01-24 NOTE — PROGRESS NOTES
Discharge Summary  Discharge Summary from Physical/Occupational Therapy Report    Patient: Mark Busch Jr.   : 1933  Today's Date: 2022    Patient seen for 11 visits.    Discharge Status of Patient: See 11-10-21 treatment note for detail.    Goals: Partially Met    Discharge Plan: Continue with current home exercise program as instructed    Comments :  Pt decided to put PT for balance on hold due to having some foot pain and wants to get that straightened out first.      Thank you for this referral to [facility].    SIGNATURE: Sima Jiang, PT

## 2022-03-02 NOTE — TELEPHONE ENCOUNTER
Caller: Mark Busch Jr.    Relationship: Self    Best call back number: [phone]    What medications are you currently taking:   Current Outpatient Medications on File Prior to Visit   Medication Sig Dispense Refill   • atorvastatin (LIPITOR) 20 MG tablet Take 1 tablet by mouth Daily. 90 tablet 1   • cetirizine (zyrTEC) 10 MG tablet Take 10 mg by mouth Daily.     • levothyroxine (SYNTHROID, LEVOTHROID) 25 MCG tablet Take 1 tablet by mouth Every Morning. 90 tablet 1   • ondansetron ODT (ZOFRAN-ODT) 8 MG disintegrating tablet Place 1 tablet on the tongue Every 8 (Eight) Hours As Needed for Nausea or Vomiting. 30 tablet 1   • OXcarbazepine (TRILEPTAL) 300 MG tablet Take 1 tablet by mouth twice daily 180 tablet 0   • oxybutynin XL (DITROPAN-XL) 5 MG 24 hr tablet Take 1 tablet by mouth Daily. 90 tablet 2   • pantoprazole (PROTONIX) 40 MG EC tablet Take 1 tablet by mouth Daily. 90 tablet 1   • pindolol (VISKEN) 5 MG tablet Take 1 tablet by mouth 2 (Two) Times a Day. 180 tablet 2   • sertraline (ZOLOFT) 50 MG tablet Take 75 mg by mouth Daily.     • traZODone (DESYREL) 50 MG tablet TAKE 1 TABLET BY MOUTH AT  NIGHT 90 tablet 1   • vitamin D (ERGOCALCIFEROL) 1.25 MG (32962 UT) capsule capsule TAKE 1 CAPSULE BY MOUTH  ONCE WEEKLY 13 capsule 3     No current facility-administered medications on file prior to visit.          When did you start taking these medications: YEARS    Which medication are you concerned about: PANTOPRAZOLE    Who prescribed you this medication: DR. FISHER    What are your concerns: PATIENT SAID AT HIS LAST APPOINTMENT DR. FISHER INDICATED THAT HE WANTED HIM TO START TAKING 20 MG OF THIS INSTEAD OF THE 40 MG    HE IS WANTING TO SEE IF DR. FISHER WOULD BE OK WITH HIM TAKING 40 MG EVERY OTHER DAY SINCE HE JUST GOT A REFILL OF THEM AT 40 MG A DAY    IF NOT, HE WILL NEED A NEW PRESCRIPTION FOR THE 20 MG     How long have you had these concerns: N/A

## 2022-03-02 NOTE — TELEPHONE ENCOUNTER
HUB TO SHARE: LEFT VM LETTING PATIENT KNOW DR FISHER RECOMMENDS JUST SPLITTING PILLS IN HALF RATHER THAN TAKING A WHOLE ONE EVERY OTHER DAY.

## 2022-03-03 NOTE — TELEPHONE ENCOUNTER
[facility] GAVE MESSAGE, NOE HAS ADDITIONAL REQUEST     Caller: Noe Busch Jr.    Relationship: Self    Best call back number: [phone]      pantoprazole (PROTONIX) 40 MG EC tablet (20 MG REQUESTED)    Pharmacy where request should be sent: [facility] - [address], [address] - [phone] ph - [phone] FX     Additional details provided by patient: NOE SAYS THAT HE RECEIVED A CALL TO SPLIT THE MEDICATION , HE HAS BEEN SPLITTING THE 40MG TABLETS.  HE SAYS INSTRUCTIONS ON THE BOTTLE SAYS DO NOT SPLIT. HE WOULD LIKE  A NEW PRESCRIPTION SENT TO PHARMACY FOR 20 MG INSTEAD.         Does the patient have less than a 3 day supply:  [x] Yes  [] No    Lorenzo Hargrove   03/03/22 15:34 EST

## 2022-03-15 NOTE — ED PROVIDER NOTES
"Subjective   History of Present Illness  Context: 88-year-old male presents with complaints of nausea vomiting.  He states he has had problems with this intermittently for 20 years.  He states he started having trouble again a few hours ago.  He reports his colostomy has been still functioning.  He has vomited twice this morning.  He has had no cough or shortness of breath.  He complains of some mild abdominal discomfort.  Location: Abdomen  Quality: Discomfort  Duration: This a.m.  Timing: Constant  Severity: Mild   Modifying factors: History of bowel obstruction colostomy  Associated signs and symptoms: Nausea vomiting    Review of Systems   Constitutional: Negative for fever and unexpected weight change.   HENT: Negative for congestion and sore throat.    Eyes: Negative for pain.   Respiratory: Negative for cough and shortness of breath.    Cardiovascular: Negative for chest pain and leg swelling.   Gastrointestinal: Positive for abdominal pain, nausea and vomiting. Negative for diarrhea.   Genitourinary: Negative for dysuria and urgency.   Musculoskeletal: Negative for back pain.   Skin: Negative for rash.   Neurological: Negative for dizziness, weakness and headaches.   Psychiatric/Behavioral: Negative for confusion.       Past Medical History:   Diagnosis Date   • Anxiety    • Benign bladder tumor    • Benign prostatic hyperplasia    • Colostomy in place (HCC)    • Dermatochalasis of both upper eyelids    • GERD (gastroesophageal reflux disease)    • History of seizure     ?? PT STATESHE HAS \"EPISODES\", LOSS OF CONSCIOUSNESS AT TIMES - ON MEIDCATION, SEES NEURO IN [place], NO \"EPISODES\" SINCE LAT 2018   • History of subdural hematoma    • Hyperlipidemia    • Hypothyroidism    • Lentigo maligna (HCC)    • Nausea and vomiting 03/09/2021   • Overweight (BMI 25.0-29.9)    • Prediabetes    • Rectal cancer (HCC)     h/o resection, colostomy, and radiation   • Senile ectropion of both lower eyelids    • Sleep apnea     " DOES NOT WEAR CPAP   • Small bowel obstruction (HCC) 03/09/2021   • Vasovagal syncope    • Venous stasis dermatitis        No Known Allergies    Past Surgical History:   Procedure Laterality Date   • BLEPHAROPLASTY Left 6/6/2019    Procedure: LEFT UPPER LID BLEPHAROPLASTY;  Surgeon: Luis Edwards MD;  Location: [facility] – [place], [place];  Service: Ophthalmology   • RO HOLE FOR SUBDURAL HEMATOMA  2016   • CATARACT EXTRACTION, BILATERAL     • COLON RESECTION WITH COLOSTOMY  2004    D/T RECTAL CANCER    • COLONOSCOPY     • ECTROPION REPAIR Bilateral 6/6/2019    Procedure: BILATERAL LOWER LID ECTROPION REPAIR WITH MEDIAL SPENDEL;  Surgeon: Luis Edwards MD;  Location: [facility] [place], [place];  Service: Ophthalmology   • ENDOSCOPY     • HEMORRHOIDECTOMY     • HERNIA REPAIR     • PROSTATE BIOPSY         Family History   Problem Relation Age of Onset   • Heart failure Mother    • Heart attack Father    • Dementia Sister    • Malig Hyperthermia Neg Hx        Social History     Socioeconomic History   • Marital status:    Tobacco Use   • Smoking status: Never Smoker   • Smokeless tobacco: Never Used   Vaping Use   • Vaping Use: Never used   Substance and Sexual Activity   • Alcohol use: No   • Drug use: No   • Sexual activity: Defer     Prior to Admission medications    Medication Sig Start Date End Date Taking? Authorizing Provider   atorvastatin (LIPITOR) 20 MG tablet Take 1 tablet by mouth Daily. 12/22/21   Melvin Paz MD   cetirizine (zyrTEC) 10 MG tablet Take 10 mg by mouth Daily.    Provider, MD Kiet   levothyroxine (SYNTHROID, LEVOTHROID) 25 MCG tablet Take 1 tablet by mouth Every Morning. 12/22/21   Melvin Paz MD   ondansetron ODT (ZOFRAN-ODT) 8 MG disintegrating tablet Place 1 tablet on the tongue Every 8 (Eight) Hours As Needed for Nausea or Vomiting. 8/11/21   Melvin Paz MD   OXcarbazepine (TRILEPTAL) 300 MG tablet Take 1 tablet by mouth twice daily 1/4/22    Page Loyd MD   oxybutynin XL (DITROPAN-XL) 5 MG 24 hr tablet Take 1 tablet by mouth Daily. 10/5/21   Melvin Paz MD   pantoprazole (PROTONIX) 20 MG EC tablet Take 1 tablet by mouth Daily. 3/3/22   Melvin Paz MD   pindolol (VISKEN) 5 MG tablet Take 1 tablet by mouth 2 (Two) Times a Day. 8/20/21   Melvin Paz MD   sertraline (ZOLOFT) 50 MG tablet Take 75 mg by mouth Daily.    Provider, MD Kiet   traZODone (DESYREL) 50 MG tablet TAKE 1 TABLET BY MOUTH AT  NIGHT 2/28/22   Melvin Paz MD   vitamin D (ERGOCALCIFEROL) 1.25 MG (86065 UT) capsule capsule TAKE 1 CAPSULE BY MOUTH  ONCE WEEKLY 2/7/22   Melvin Paz MD           Objective   Physical Exam   88-year-old male awake alert.  Generally well-developed well-nourished.  Pupils equal round at light.  Oropharynx mucous membranes moist neck supple chest clear equal breath sounds.  Cardiovascular regular rate and rhythm.  Abdomen is soft some upper abdominal fullness.  No mass rebound or guarding.  Colostomy is noted left lower quadrant.  There is air within the bag.  Extremities without tenderness edema.  Skin without rash noted.  Neuro exam without focal findings noted.    Procedures           ED Course      Results for orders placed or performed during the hospital encounter of 03/15/22   Comprehensive Metabolic Panel    Specimen: Blood   Result Value Ref Range    Glucose 155 (H) 65 - 99 mg/dL    BUN 17 8 - 23 mg/dL    Creatinine 0.92 0.76 - 1.27 mg/dL    Sodium 136 136 - 145 mmol/L    Potassium 4.2 3.5 - 5.2 mmol/L    Chloride 100 98 - 107 mmol/L    CO2 25.0 22.0 - 29.0 mmol/L    Calcium 9.7 8.6 - 10.5 mg/dL    Total Protein 7.0 6.0 - 8.5 g/dL    Albumin 4.50 3.50 - 5.20 g/dL    ALT (SGPT) 16 1 - 41 U/L    AST (SGOT) 17 1 - 40 U/L    Alkaline Phosphatase 108 39 - 117 U/L    Total Bilirubin 0.5 0.0 - 1.2 mg/dL    Globulin 2.5 gm/dL    A/G Ratio 1.8 g/dL    BUN/Creatinine Ratio 18.5 7.0 - 25.0     Anion Gap 11.0 5.0 - 15.0 mmol/L    eGFR 80.0 >60.0 mL/min/1.73   Lipase    Specimen: Blood   Result Value Ref Range    Lipase 16 13 - 60 U/L   CBC Auto Differential    Specimen: Blood   Result Value Ref Range    WBC 9.10 3.40 - 10.80 10*3/mm3    RBC 4.47 4.14 - 5.80 10*6/mm3    Hemoglobin 14.3 13.0 - 17.7 g/dL    Hematocrit 41.6 37.5 - 51.0 %    MCV 93.1 79.0 - 97.0 fL    MCH 32.0 26.6 - 33.0 pg    MCHC 34.3 31.5 - 35.7 g/dL    RDW 12.8 12.3 - 15.4 %    RDW-SD 42.0 37.0 - 54.0 fl    MPV 8.2 6.0 - 12.0 fL    Platelets 176 140 - 450 10*3/mm3    Neutrophil % 88.9 (H) 42.7 - 76.0 %    Lymphocyte % 5.4 (L) 19.6 - 45.3 %    Monocyte % 5.4 5.0 - 12.0 %    Eosinophil % 0.2 (L) 0.3 - 6.2 %    Basophil % 0.1 0.0 - 1.5 %    Neutrophils, Absolute 8.10 (H) 1.70 - 7.00 10*3/mm3    Lymphocytes, Absolute 0.50 (L) 0.70 - 3.10 10*3/mm3    Monocytes, Absolute 0.50 0.10 - 0.90 10*3/mm3    Eosinophils, Absolute 0.00 0.00 - 0.40 10*3/mm3    Basophils, Absolute 0.00 0.00 - 0.20 10*3/mm3    nRBC 0.0 0.0 - 0.2 /100 WBC   Urinalysis With Microscopic If Indicated (No Culture) - Urine, Clean Catch    Specimen: Urine, Clean Catch   Result Value Ref Range    Color, UA Yellow Yellow, Straw    Appearance, UA Clear Clear    pH, UA 8.0 5.0 - 8.0    Specific Gravity, UA 1.020 1.005 - 1.030    Glucose,  mg/dL (Trace) (A) Negative    Ketones, UA Trace (A) Negative    Bilirubin, UA Negative Negative    Blood, UA Negative Negative    Protein, UA Negative Negative    Leuk Esterase, UA Negative Negative    Nitrite, UA Negative Negative    Urobilinogen, UA 1.0 E.U./dL 0.2 - 1.0 E.U./dL     No radiology results for the last day  Medications   lactated ringers infusion (125 mL/hr Intravenous New Bag 3/15/22 0542)   ondansetron (ZOFRAN) injection 4 mg (4 mg Intravenous Given 3/15/22 0542)   diphenhydrAMINE (BENADRYL) injection 25 mg (25 mg Intravenous Given 3/15/22 0559)   droperidol (INAPSINE) injection 1.25 mg (1.25 mg Intravenous Given 3/15/22 0600)  "    /83   Pulse 81   Temp 98.4 °F (36.9 °C) (Oral)   Resp 14   Ht 167.6 cm (66\")   Wt 72.6 kg (160 lb)   SpO2 92%   BMI 25.82 kg/m²                                              [facility]  Chart review: Patient had admission August the last year for small bowel obstruction.  Patient improved with conservative management.  Comorbidity: As per past history  Differential: Gastritis, partial bowel obstruction  My EKG interpretation:   Lab: Normal comprehensive metabolic panel with exception of glucose of 155 lipase normal urinalysis trace ketones CBC normal white count hemoglobin platelet count 88 segs no bands  Radiology: CT scan was ordered but patient declined  Discussion/treatment: Patient was started on IV fluids.  He was given Zofran.  He was still dry heaving was given Inapsine and Benadryl.  He had resolution of dry heaves with this.  He has had partial bowel obstruction in the past.  He feels like it will resolve with rest.  Disposition was discussed with him.  He does not desire admission for observation wants to go home.  Advised to stay on clear liquids today, he was given Compazine for nausea.  To follow-up with primary divider, return new or worsening symptoms.  He is understanding of this and agreement with this.  Patient was evaluated using appropriate PPE            Final diagnoses:   Intractable vomiting with nausea, unspecified vomiting type       ED Disposition  ED Disposition     ED Disposition   Discharge    Condition   Stable    Comment   --             No follow-up provider specified.       Medication List      New Prescriptions    prochlorperazine 10 MG tablet  Commonly known as: COMPAZINE  Take 1 tablet by mouth Every 6 (Six) Hours As Needed for Nausea or Vomiting.           Where to Get Your Medications      You can get these medications from any pharmacy    Bring a paper prescription for each of these medications  · prochlorperazine 10 MG tablet          Néstor Grubbs MD  03/15/22 " [id number]

## 2022-03-15 NOTE — DISCHARGE INSTRUCTIONS
Rest today, small sips of fluids, follow-up with primary provider, return new or worsening symptoms

## 2022-06-06 NOTE — PROGRESS NOTES
Chief Complaint   Patient presents with   • Hypertension   • Hypothyroidism   • Hyperlipidemia   • Diabetes     HPI  Mark Busch Jr. is a 88 y.o. male that presents for   Chief Complaint   Patient presents with   • Hypertension   • Hypothyroidism   • Hyperlipidemia   • Diabetes     Fall: patient reports recent fall at home. He ultimately presented to ER a few days later and CT was performed, which was reassuring. He reports unsteadiness on his feet and has had falls in the past. Patient reports this is at least partially related to L ankle pain and has brace, which he doesn't like to use. He completed some PT for gait instability about 6 months ago. Subsequently, patient is not interested in doing more at this time    GERD: pantoprazole was reduced to 20mg daily at last visit. Denies increased heartburn w/ this reduction in medication.    DM: last a1c 7.0. Not maintained on any medication. No numbness in feet. 12/2021 microalb/Cr 15. Last eye exam 3/2022    VitD deficiency: last VitD 20.8. He is maintained on ergo 50k weekly. No issues at this time.    HTN: 130/72 today. Maintained on pindolol 5 BID. No LH/dizziness, CP or SOB    Review of Systems   Constitutional: Negative for fever and unexpected weight loss.   Respiratory: Negative for cough and shortness of breath.    Cardiovascular: Negative for chest pain and palpitations.   Gastrointestinal: Negative for GERD and indigestion.   Musculoskeletal: Positive for arthralgias and gait problem.   Neurological: Negative for dizziness, light-headedness and numbness.     The following portions of the patient's history were reviewed and updated as appropriate: problem list, past medical history, past surgical history, allergies, current medication    Problem List Tab  Patient History Tab  Immunizations Tab  Medications Tab  Chart Review Tab  Care Everywhere Tab  Synopsis Tab    PE  Vitals:    06/06/22 0804   BP: 130/72   Pulse: 56   Resp: 18   Temp: 96.6 °F (35.9 °C)    SpO2: 99%     Body mass index is 25.34 kg/m².  General: Well nourished, NAD  Head: AT/NC  Eyes: EOMI, anicteric sclera  Resp: CTAB, SCR, BS equal  CV: RRR w/o m/r/g; 2+ pulses  GI: Soft, NT/ND, +BS  MSK: FROM, no deformity, no edema  Skin: Warm, dry, intact  Neuro: Alert and oriented. No focal deficits.  Broad-based, unsteady gait  Psych: Appropriate mood and affect    Imaging  CT Head Without Contrast    Result Date: 5/29/2022  No acute intracranial abnormality  Electronically Signed By-Jack Reeves On:5/29/2022 10:46 AM This report was finalized on [id number] by  Jack Reeves, .    CT Cervical Spine Without Contrast    Result Date: 5/29/2022  No evidence of cervical spine fracture  Electronically Signed By-Jack Reeves On:5/29/2022 10:48 AM This report was finalized on [id number] by  Jack Reeves, .      Assessment & Plan   Mark Busch Jr. is a 88 y.o. male that presents for   Chief Complaint   Patient presents with   • Hypertension   • Hypothyroidism   • Hyperlipidemia   • Diabetes     Diagnoses and all orders for this visit:    1. Gait instability (Primary)   -Consider repeating physical therapy and/or different ankle brace    2. Gastroesophageal reflux disease, unspecified whether esophagitis present   -Continue home pantoprazole 40 daily    3. Type 2 diabetes mellitus without complication, without long-term current use of insulin (HCC): Last A1c 7.0. 12/2021 microalb/Cr 15. Last eye exam 3/2022  -     Comprehensive Metabolic Panel  -     Hemoglobin A1c  - Monitor off medication  - Continue home statin  - Recommend regular exercise and annual eye exam    4. Vitamin D deficiency  -     Vitamin D 25 Hydroxy  - Continue home ergocalciferol 50,000 units weekly pending labs today    5. Essential hypertension: 130/72 today  -     Comprehensive Metabolic Panel  - Continue home pindolol 5 mg twice daily for now    Other orders  -     ondansetron ODT (ZOFRAN-ODT) 8 MG disintegrating tablet; Place 1  tablet on the tongue Every 8 (Eight) Hours As Needed for Nausea or Vomiting.  Dispense: 30 tablet; Refill: 1    Pt is requesting 3-4 month f/u for his comfort     Return in about 4 months (around 10/6/2022) for Recheck- 30min.

## 2022-06-07 NOTE — PROGRESS NOTES
"Left vm for patient with results.    FOR HUB TO SAY    \"Labs look good w/ a1c stable at 7.0. No changes at this time\""

## 2022-06-08 NOTE — OUTREACH NOTE
AMBULATORY CASE MANAGEMENT NOTE    Name and Relationship of Patient/Support Person: Mark Busch Jr. - Self    Care Coordination    Multiple(x4) RN-ACM outreach attempts to patient unsuccessful s/p ED discharge. Patient eligible for RN- Ambulatory . RN-ACM available for assistance as needed. RN-ACM can be reached at [phone].    SCOTT ROTHMAN  Ambulatory Case Management    6/8/2022, 09:37 EDT

## 2022-07-22 NOTE — TELEPHONE ENCOUNTER
Caller: Noe Busch Jr.    Relationship: Self    Best call back number: [phone]     Who is your current provider: DR. FISHER    Who would you like your new provider to be: KAY SWANSON     What are your reasons for transferring care:     [employer] SAYS DR. FISHER IS NO LONGER A PARTICIPANT WITH  THE INSURANCE    NOE IS CURRENTLY  SCHEDULED WITH  DR. FISHER ON 10/19/2022, HE IS WANTING TO UPDATE APPOINTMENT WITH RERE SWANSON IF HE IS ESTABLISHED WITH HER

## 2022-07-25 NOTE — TELEPHONE ENCOUNTER
True -   Credentialing will need to update his contract.  They (credentialing) are aware of the situation

## 2022-11-16 NOTE — TELEPHONE ENCOUNTER
Mark with [facility] Medicine Team wants to speak with you (CMM).  I asked if you would know what this was in regards to and he said as long as you have a computer you would know.  Please call him.

## 2023-01-27 ENCOUNTER — TELEPHONE (OUTPATIENT)
Dept: FAMILY MEDICINE CLINIC | Facility: CLINIC | Age: 88
End: 2023-01-27

## 2023-01-27 NOTE — TELEPHONE ENCOUNTER
“Please be informed that patient has passed. Patient has been marked  in the system. The date of death is: 22.    Caller: NATE STARR    Relationship: Emergency Contact    Best call back number:

## (undated) DEVICE — NDL HYPO PRECISIONGLIDE REG 25G 1 1/2

## (undated) DEVICE — STERILE COTTON TIP 6IN 10PK: Brand: MEDLINE

## (undated) DEVICE — IMMOB HD UNIV CLR DISP

## (undated) DEVICE — SUT GUT PLN FAST ABS 5/0 PC1 18IN 1915G

## (undated) DEVICE — SWABSTK SKINPREP PVPI LF PK/3

## (undated) DEVICE — STERILE TOOTHPICK SET: Brand: CENTURION

## (undated) DEVICE — WIPE INST MEROCEL

## (undated) DEVICE — INTENDED FOR TISSUE SEPARATION, AND OTHER PROCEDURES THAT REQUIRE A SHARP SURGICAL BLADE TO PUNCTURE OR CUT.: Brand: BARD-PARKER ® CARBON RIB-BACK BLADES

## (undated) DEVICE — SUT VIC 6/0 P3 18IN J492G

## (undated) DEVICE — CROUCH CORNEAL PROTECTOR: Brand: BAUSCH + LOMB

## (undated) DEVICE — ELECTRD NDL EZ CLN MOD 2.75IN

## (undated) DEVICE — PK ENT 40

## (undated) DEVICE — SUT VIC 5/0 P3 18IN J493G

## (undated) DEVICE — GLV SURG BIOGEL SENSR LTX PF SZ7.5